# Patient Record
Sex: FEMALE | Race: WHITE | NOT HISPANIC OR LATINO
[De-identification: names, ages, dates, MRNs, and addresses within clinical notes are randomized per-mention and may not be internally consistent; named-entity substitution may affect disease eponyms.]

---

## 2017-01-06 ENCOUNTER — APPOINTMENT (OUTPATIENT)
Dept: INTERNAL MEDICINE | Facility: CLINIC | Age: 82
End: 2017-01-06

## 2017-01-06 VITALS
HEART RATE: 89 BPM | SYSTOLIC BLOOD PRESSURE: 189 MMHG | TEMPERATURE: 98 F | BODY MASS INDEX: 21.5 KG/M2 | RESPIRATION RATE: 12 BRPM | WEIGHT: 137 LBS | OXYGEN SATURATION: 98 % | HEIGHT: 67 IN | DIASTOLIC BLOOD PRESSURE: 109 MMHG

## 2017-01-06 VITALS — DIASTOLIC BLOOD PRESSURE: 106 MMHG | SYSTOLIC BLOOD PRESSURE: 180 MMHG

## 2017-02-10 ENCOUNTER — APPOINTMENT (OUTPATIENT)
Dept: INTERNAL MEDICINE | Facility: CLINIC | Age: 82
End: 2017-02-10

## 2017-02-14 ENCOUNTER — APPOINTMENT (OUTPATIENT)
Dept: INTERNAL MEDICINE | Facility: CLINIC | Age: 82
End: 2017-02-14

## 2017-02-14 VITALS — DIASTOLIC BLOOD PRESSURE: 85 MMHG | SYSTOLIC BLOOD PRESSURE: 145 MMHG

## 2017-02-14 VITALS
OXYGEN SATURATION: 98 % | WEIGHT: 138 LBS | HEIGHT: 67 IN | RESPIRATION RATE: 12 BRPM | DIASTOLIC BLOOD PRESSURE: 95 MMHG | TEMPERATURE: 97.8 F | SYSTOLIC BLOOD PRESSURE: 172 MMHG | HEART RATE: 111 BPM | BODY MASS INDEX: 21.66 KG/M2

## 2017-02-14 VITALS — DIASTOLIC BLOOD PRESSURE: 90 MMHG | SYSTOLIC BLOOD PRESSURE: 150 MMHG

## 2017-02-14 DIAGNOSIS — G44.219 EPISODIC TENSION-TYPE HEADACHE, NOT INTRACTABLE: ICD-10-CM

## 2017-02-15 ENCOUNTER — APPOINTMENT (OUTPATIENT)
Dept: CARDIOLOGY | Facility: CLINIC | Age: 82
End: 2017-02-15

## 2017-02-15 VITALS
BODY MASS INDEX: 22.29 KG/M2 | OXYGEN SATURATION: 96 % | HEIGHT: 67 IN | DIASTOLIC BLOOD PRESSURE: 80 MMHG | HEART RATE: 94 BPM | RESPIRATION RATE: 12 BRPM | WEIGHT: 142 LBS | SYSTOLIC BLOOD PRESSURE: 158 MMHG | TEMPERATURE: 97.8 F

## 2017-02-15 VITALS — SYSTOLIC BLOOD PRESSURE: 128 MMHG | DIASTOLIC BLOOD PRESSURE: 80 MMHG

## 2017-02-15 LAB
25(OH)D3 SERPL-MCNC: 37.1 NG/ML
ALBUMIN SERPL ELPH-MCNC: 4.2 G/DL
ALP BLD-CCNC: 82 U/L
ALT SERPL-CCNC: 21 U/L
ANION GAP SERPL CALC-SCNC: 21 MMOL/L
AST SERPL-CCNC: 31 U/L
BILIRUB SERPL-MCNC: 0.6 MG/DL
BUN SERPL-MCNC: 15 MG/DL
CALCIUM SERPL-MCNC: 9.3 MG/DL
CHLORIDE SERPL-SCNC: 101 MMOL/L
CHOLEST SERPL-MCNC: 220 MG/DL
CHOLEST/HDLC SERPL: 2.7 RATIO
CO2 SERPL-SCNC: 25 MMOL/L
CREAT SERPL-MCNC: 0.92 MG/DL
CREAT SPEC-SCNC: 54 MG/DL
FERRITIN SERPL-MCNC: 115 NG/ML
GLUCOSE SERPL-MCNC: 71 MG/DL
HBA1C MFR BLD HPLC: 5.6 %
HDLC SERPL-MCNC: 83 MG/DL
IRON SERPL-MCNC: 91 UG/DL
LDLC SERPL CALC-MCNC: 109 MG/DL
MICROALBUMIN 24H UR DL<=1MG/L-MCNC: 1.4 MG/DL
MICROALBUMIN/CREAT 24H UR-RTO: 26 UG/MG
POTASSIUM SERPL-SCNC: 4 MMOL/L
PROT SERPL-MCNC: 7.3 G/DL
SODIUM SERPL-SCNC: 147 MMOL/L
TRIGL SERPL-MCNC: 142 MG/DL
TSH SERPL-ACNC: 5.7 UIU/ML

## 2017-02-22 DIAGNOSIS — E87.0 HYPEROSMOLALITY AND HYPERNATREMIA: ICD-10-CM

## 2017-02-22 LAB
BASOPHILS # BLD AUTO: 0.05 K/UL
BASOPHILS NFR BLD AUTO: 0.6 %
EOSINOPHIL # BLD AUTO: 0.18 K/UL
EOSINOPHIL NFR BLD AUTO: 2.3 %
HCT VFR BLD CALC: 45.3 %
HGB BLD-MCNC: 14.5 G/DL
IMM GRANULOCYTES NFR BLD AUTO: 0.3 %
LYMPHOCYTES # BLD AUTO: 1.83 K/UL
LYMPHOCYTES NFR BLD AUTO: 23.1 %
MAN DIFF?: NORMAL
MCHC RBC-ENTMCNC: 30.6 PG
MCHC RBC-ENTMCNC: 32 GM/DL
MCV RBC AUTO: 95.6 FL
MONOCYTES # BLD AUTO: 0.72 K/UL
MONOCYTES NFR BLD AUTO: 9.1 %
NEUTROPHILS # BLD AUTO: 5.11 K/UL
NEUTROPHILS NFR BLD AUTO: 64.6 %
PLATELET # BLD AUTO: 233 K/UL
RBC # BLD: 4.74 M/UL
RBC # FLD: 13.7 %
WBC # FLD AUTO: 7.91 K/UL

## 2017-03-22 ENCOUNTER — NON-APPOINTMENT (OUTPATIENT)
Age: 82
End: 2017-03-22

## 2017-03-22 ENCOUNTER — APPOINTMENT (OUTPATIENT)
Dept: CARDIOLOGY | Facility: CLINIC | Age: 82
End: 2017-03-22

## 2017-03-22 VITALS
DIASTOLIC BLOOD PRESSURE: 95 MMHG | BODY MASS INDEX: 21.46 KG/M2 | WEIGHT: 137 LBS | OXYGEN SATURATION: 98 % | RESPIRATION RATE: 12 BRPM | HEART RATE: 103 BPM | SYSTOLIC BLOOD PRESSURE: 158 MMHG

## 2017-03-22 VITALS — DIASTOLIC BLOOD PRESSURE: 78 MMHG | SYSTOLIC BLOOD PRESSURE: 134 MMHG

## 2017-03-23 LAB
ANION GAP SERPL CALC-SCNC: 17 MMOL/L
BUN SERPL-MCNC: 18 MG/DL
CALCIUM SERPL-MCNC: 9.4 MG/DL
CHLORIDE SERPL-SCNC: 97 MMOL/L
CO2 SERPL-SCNC: 22 MMOL/L
CREAT SERPL-MCNC: 1.03 MG/DL
GLUCOSE SERPL-MCNC: 80 MG/DL
POTASSIUM SERPL-SCNC: 4.4 MMOL/L
SODIUM SERPL-SCNC: 136 MMOL/L

## 2017-05-02 ENCOUNTER — RX RENEWAL (OUTPATIENT)
Age: 82
End: 2017-05-02

## 2017-06-07 ENCOUNTER — APPOINTMENT (OUTPATIENT)
Dept: INTERNAL MEDICINE | Facility: CLINIC | Age: 82
End: 2017-06-07

## 2017-06-07 VITALS
BODY MASS INDEX: 20.88 KG/M2 | OXYGEN SATURATION: 98 % | HEART RATE: 89 BPM | WEIGHT: 133 LBS | TEMPERATURE: 97.4 F | SYSTOLIC BLOOD PRESSURE: 157 MMHG | HEIGHT: 67 IN | DIASTOLIC BLOOD PRESSURE: 89 MMHG | RESPIRATION RATE: 12 BRPM

## 2017-06-07 VITALS — SYSTOLIC BLOOD PRESSURE: 130 MMHG | DIASTOLIC BLOOD PRESSURE: 80 MMHG

## 2017-06-07 DIAGNOSIS — K62.5 HEMORRHAGE OF ANUS AND RECTUM: ICD-10-CM

## 2017-06-07 DIAGNOSIS — R26.81 UNSTEADINESS ON FEET: ICD-10-CM

## 2017-06-07 RX ADMIN — CYANOCOBALAMIN 1 MCG/ML: 1000 INJECTION INTRAMUSCULAR; SUBCUTANEOUS at 00:00

## 2017-06-08 RX ORDER — CYANOCOBALAMIN 1000 UG/ML
1000 INJECTION INTRAMUSCULAR; SUBCUTANEOUS
Qty: 0 | Refills: 0 | Status: COMPLETED | OUTPATIENT
Start: 2017-06-07

## 2017-06-21 ENCOUNTER — NON-APPOINTMENT (OUTPATIENT)
Age: 82
End: 2017-06-21

## 2017-06-21 ENCOUNTER — APPOINTMENT (OUTPATIENT)
Dept: CARDIOLOGY | Facility: CLINIC | Age: 82
End: 2017-06-21

## 2017-06-21 VITALS
SYSTOLIC BLOOD PRESSURE: 136 MMHG | WEIGHT: 136 LBS | HEIGHT: 67 IN | HEART RATE: 80 BPM | BODY MASS INDEX: 21.35 KG/M2 | OXYGEN SATURATION: 96 % | DIASTOLIC BLOOD PRESSURE: 75 MMHG | RESPIRATION RATE: 12 BRPM

## 2017-08-09 ENCOUNTER — APPOINTMENT (OUTPATIENT)
Dept: INTERNAL MEDICINE | Facility: CLINIC | Age: 82
End: 2017-08-09
Payer: MEDICARE

## 2017-08-09 VITALS — DIASTOLIC BLOOD PRESSURE: 80 MMHG | SYSTOLIC BLOOD PRESSURE: 130 MMHG

## 2017-08-09 VITALS
DIASTOLIC BLOOD PRESSURE: 89 MMHG | TEMPERATURE: 97.5 F | HEART RATE: 74 BPM | WEIGHT: 132 LBS | RESPIRATION RATE: 12 BRPM | BODY MASS INDEX: 20.72 KG/M2 | HEIGHT: 67 IN | SYSTOLIC BLOOD PRESSURE: 167 MMHG | OXYGEN SATURATION: 98 %

## 2017-08-09 PROCEDURE — 99215 OFFICE O/P EST HI 40 MIN: CPT

## 2017-08-09 RX ORDER — FLUTICASONE PROPIONATE 50 UG/1
50 SPRAY, METERED NASAL TWICE DAILY
Qty: 1 | Refills: 3 | Status: DISCONTINUED | COMMUNITY
Start: 2017-06-07 | End: 2017-08-09

## 2017-08-09 RX ORDER — AZITHROMYCIN 250 MG/1
250 TABLET, FILM COATED ORAL
Qty: 30 | Refills: 5 | Status: DISCONTINUED | COMMUNITY
Start: 2017-06-07 | End: 2017-08-09

## 2017-08-09 RX ORDER — MIRTAZAPINE 15 MG/1
15 TABLET, FILM COATED ORAL
Qty: 30 | Refills: 3 | Status: DISCONTINUED | COMMUNITY
End: 2017-08-09

## 2017-08-09 RX ORDER — AZITHROMYCIN 250 MG/1
250 TABLET, FILM COATED ORAL
Qty: 1 | Refills: 0 | Status: DISCONTINUED | COMMUNITY
Start: 2017-06-07 | End: 2017-08-09

## 2017-09-05 ENCOUNTER — APPOINTMENT (OUTPATIENT)
Dept: INTERNAL MEDICINE | Facility: CLINIC | Age: 82
End: 2017-09-05
Payer: MEDICARE

## 2017-09-05 VITALS
SYSTOLIC BLOOD PRESSURE: 177 MMHG | HEART RATE: 86 BPM | HEIGHT: 67 IN | DIASTOLIC BLOOD PRESSURE: 96 MMHG | WEIGHT: 130 LBS | BODY MASS INDEX: 20.4 KG/M2 | TEMPERATURE: 97.7 F | RESPIRATION RATE: 12 BRPM | OXYGEN SATURATION: 97 %

## 2017-09-05 VITALS — DIASTOLIC BLOOD PRESSURE: 80 MMHG | SYSTOLIC BLOOD PRESSURE: 130 MMHG

## 2017-09-05 DIAGNOSIS — Z87.39 PERSONAL HISTORY OF OTHER DISEASES OF THE MUSCULOSKELETAL SYSTEM AND CONNECTIVE TISSUE: ICD-10-CM

## 2017-09-05 PROCEDURE — 99397 PER PM REEVAL EST PAT 65+ YR: CPT

## 2017-09-06 LAB
25(OH)D3 SERPL-MCNC: 39.1 NG/ML
ALBUMIN SERPL ELPH-MCNC: 4.1 G/DL
ALP BLD-CCNC: 63 U/L
ALT SERPL-CCNC: 20 U/L
ANION GAP SERPL CALC-SCNC: 18 MMOL/L
APPEARANCE: CLEAR
AST SERPL-CCNC: 28 U/L
BASOPHILS # BLD AUTO: 0.03 K/UL
BASOPHILS NFR BLD AUTO: 0.5 %
BILIRUB SERPL-MCNC: 0.7 MG/DL
BILIRUBIN URINE: NEGATIVE
BLOOD URINE: NEGATIVE
BUN SERPL-MCNC: 18 MG/DL
CALCIUM SERPL-MCNC: 9.7 MG/DL
CHLORIDE SERPL-SCNC: 98 MMOL/L
CHOLEST SERPL-MCNC: 211 MG/DL
CHOLEST/HDLC SERPL: 2.6 RATIO
CO2 SERPL-SCNC: 22 MMOL/L
COLOR: YELLOW
CREAT SERPL-MCNC: 1.03 MG/DL
CREAT SPEC-SCNC: 22 MG/DL
EOSINOPHIL # BLD AUTO: 0.14 K/UL
EOSINOPHIL NFR BLD AUTO: 2.3 %
FERRITIN SERPL-MCNC: 146 NG/ML
FOLATE SERPL-MCNC: >20 NG/ML
GLUCOSE QUALITATIVE U: NORMAL MG/DL
GLUCOSE SERPL-MCNC: 87 MG/DL
HBA1C MFR BLD HPLC: 5.5 %
HCT VFR BLD CALC: 42.4 %
HDLC SERPL-MCNC: 80 MG/DL
HGB BLD-MCNC: 14 G/DL
IMM GRANULOCYTES NFR BLD AUTO: 0.2 %
KETONES URINE: NEGATIVE
LDLC SERPL CALC-MCNC: 117 MG/DL
LEUKOCYTE ESTERASE URINE: NEGATIVE
LYMPHOCYTES # BLD AUTO: 1.35 K/UL
LYMPHOCYTES NFR BLD AUTO: 21.7 %
MAN DIFF?: NORMAL
MCHC RBC-ENTMCNC: 31.3 PG
MCHC RBC-ENTMCNC: 33 GM/DL
MCV RBC AUTO: 94.6 FL
MICROALBUMIN 24H UR DL<=1MG/L-MCNC: <0.3 MG/DL
MICROALBUMIN/CREAT 24H UR-RTO: NORMAL
MONOCYTES # BLD AUTO: 0.43 K/UL
MONOCYTES NFR BLD AUTO: 6.9 %
NEUTROPHILS # BLD AUTO: 4.26 K/UL
NEUTROPHILS NFR BLD AUTO: 68.4 %
NITRITE URINE: NEGATIVE
PH URINE: 7.5
PLATELET # BLD AUTO: 225 K/UL
POTASSIUM SERPL-SCNC: 4 MMOL/L
PROT SERPL-MCNC: 7.5 G/DL
PROTEIN URINE: NEGATIVE MG/DL
RBC # BLD: 4.48 M/UL
RBC # FLD: 13.9 %
SODIUM SERPL-SCNC: 138 MMOL/L
SPECIFIC GRAVITY URINE: 1.01
TRIGL SERPL-MCNC: 72 MG/DL
TSH SERPL-ACNC: 4.18 UIU/ML
UROBILINOGEN URINE: NORMAL MG/DL
VIT B12 SERPL-MCNC: 1828 PG/ML
WBC # FLD AUTO: 6.22 K/UL

## 2018-01-03 ENCOUNTER — APPOINTMENT (OUTPATIENT)
Dept: INTERNAL MEDICINE | Facility: CLINIC | Age: 83
End: 2018-01-03
Payer: MEDICARE

## 2018-01-03 VITALS
SYSTOLIC BLOOD PRESSURE: 184 MMHG | OXYGEN SATURATION: 98 % | HEART RATE: 89 BPM | TEMPERATURE: 97.7 F | DIASTOLIC BLOOD PRESSURE: 109 MMHG | RESPIRATION RATE: 12 BRPM | WEIGHT: 130 LBS | HEIGHT: 67 IN | BODY MASS INDEX: 20.4 KG/M2

## 2018-01-03 VITALS — DIASTOLIC BLOOD PRESSURE: 80 MMHG | SYSTOLIC BLOOD PRESSURE: 135 MMHG

## 2018-01-03 PROCEDURE — 99214 OFFICE O/P EST MOD 30 MIN: CPT

## 2018-01-03 RX ORDER — PNEUMOCOCCAL 13-VALENT CONJUGATE VACCINE 2.2; 2.2; 2.2; 2.2; 2.2; 4.4; 2.2; 2.2; 2.2; 2.2; 2.2; 2.2; 2.2 UG/.5ML; UG/.5ML; UG/.5ML; UG/.5ML; UG/.5ML; UG/.5ML; UG/.5ML; UG/.5ML; UG/.5ML; UG/.5ML; UG/.5ML; UG/.5ML; UG/.5ML
INJECTION, SUSPENSION INTRAMUSCULAR
Qty: 1 | Refills: 0 | Status: DISCONTINUED | COMMUNITY
Start: 2017-09-05 | End: 2018-01-03

## 2018-04-13 ENCOUNTER — APPOINTMENT (OUTPATIENT)
Dept: INTERNAL MEDICINE | Facility: CLINIC | Age: 83
End: 2018-04-13
Payer: MEDICARE

## 2018-04-13 VITALS
RESPIRATION RATE: 12 BRPM | DIASTOLIC BLOOD PRESSURE: 92 MMHG | BODY MASS INDEX: 20.56 KG/M2 | OXYGEN SATURATION: 96 % | WEIGHT: 131 LBS | TEMPERATURE: 98.1 F | HEIGHT: 67 IN | HEART RATE: 82 BPM | SYSTOLIC BLOOD PRESSURE: 164 MMHG

## 2018-04-13 VITALS — DIASTOLIC BLOOD PRESSURE: 85 MMHG | SYSTOLIC BLOOD PRESSURE: 130 MMHG

## 2018-04-13 PROCEDURE — 99214 OFFICE O/P EST MOD 30 MIN: CPT

## 2018-04-13 RX ORDER — AMLODIPINE BESYLATE 2.5 MG/1
2.5 TABLET ORAL DAILY
Qty: 30 | Refills: 5 | Status: DISCONTINUED | COMMUNITY
Start: 2017-01-06 | End: 2018-04-13

## 2018-06-22 ENCOUNTER — APPOINTMENT (OUTPATIENT)
Dept: INTERNAL MEDICINE | Facility: CLINIC | Age: 83
End: 2018-06-22
Payer: MEDICARE

## 2018-06-22 VITALS — DIASTOLIC BLOOD PRESSURE: 75 MMHG | SYSTOLIC BLOOD PRESSURE: 130 MMHG

## 2018-06-22 VITALS
DIASTOLIC BLOOD PRESSURE: 72 MMHG | OXYGEN SATURATION: 96 % | WEIGHT: 130 LBS | SYSTOLIC BLOOD PRESSURE: 181 MMHG | HEART RATE: 93 BPM | RESPIRATION RATE: 12 BRPM | HEIGHT: 67 IN | BODY MASS INDEX: 20.4 KG/M2 | TEMPERATURE: 98.6 F
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
## 2018-06-22 PROCEDURE — 99215 OFFICE O/P EST HI 40 MIN: CPT

## 2018-06-22 NOTE — REVIEW OF SYSTEMS
[Constipation] : constipation [Heartburn] : heartburn [Dizziness] : dizziness [Negative] : Heme/Lymph [Abdominal Pain] : no abdominal pain [Nausea] : no nausea [Diarrhea] : diarrhea [Vomiting] : no vomiting [Melena] : no melena [Headache] : no headache [Memory Loss] : no memory loss [Unsteady Walking] : no ataxia

## 2018-07-11 ENCOUNTER — NON-APPOINTMENT (OUTPATIENT)
Age: 83
End: 2018-07-11

## 2018-07-11 ENCOUNTER — APPOINTMENT (OUTPATIENT)
Dept: CARDIOLOGY | Facility: CLINIC | Age: 83
End: 2018-07-11
Payer: MEDICARE

## 2018-07-11 VITALS
TEMPERATURE: 98.4 F | BODY MASS INDEX: 20.88 KG/M2 | HEIGHT: 67 IN | DIASTOLIC BLOOD PRESSURE: 72 MMHG | WEIGHT: 133 LBS | OXYGEN SATURATION: 96 % | HEART RATE: 96 BPM | RESPIRATION RATE: 12 BRPM | SYSTOLIC BLOOD PRESSURE: 171 MMHG

## 2018-07-11 VITALS — DIASTOLIC BLOOD PRESSURE: 68 MMHG | SYSTOLIC BLOOD PRESSURE: 128 MMHG

## 2018-07-11 PROCEDURE — 99214 OFFICE O/P EST MOD 30 MIN: CPT

## 2018-09-05 ENCOUNTER — LABORATORY RESULT (OUTPATIENT)
Age: 83
End: 2018-09-05

## 2018-09-05 ENCOUNTER — APPOINTMENT (OUTPATIENT)
Dept: INTERNAL MEDICINE | Facility: CLINIC | Age: 83
End: 2018-09-05
Payer: MEDICARE

## 2018-09-05 VITALS
HEART RATE: 86 BPM | SYSTOLIC BLOOD PRESSURE: 165 MMHG | RESPIRATION RATE: 12 BRPM | BODY MASS INDEX: 20.88 KG/M2 | HEIGHT: 67 IN | WEIGHT: 133 LBS | OXYGEN SATURATION: 97 % | TEMPERATURE: 98.6 F | DIASTOLIC BLOOD PRESSURE: 91 MMHG

## 2018-09-05 VITALS — DIASTOLIC BLOOD PRESSURE: 80 MMHG | SYSTOLIC BLOOD PRESSURE: 140 MMHG

## 2018-09-05 PROCEDURE — 99397 PER PM REEVAL EST PAT 65+ YR: CPT

## 2018-09-05 PROCEDURE — G0438: CPT

## 2018-09-05 RX ORDER — BISMUTH SUBSALICYLATE 525 MG/1
TABLET ORAL
Qty: 30 | Refills: 0 | Status: DISCONTINUED | COMMUNITY
Start: 2017-02-14 | End: 2018-09-05

## 2018-09-05 RX ORDER — HALOBETASOL PROPIONATE 0.5 MG/G
0.05 CREAM TOPICAL TWICE DAILY
Qty: 60 | Refills: 1 | Status: DISCONTINUED | COMMUNITY
Start: 2018-06-22 | End: 2018-09-05

## 2018-09-05 RX ORDER — SERTRALINE 25 MG/1
25 TABLET, FILM COATED ORAL DAILY
Qty: 30 | Refills: 3 | Status: DISCONTINUED | COMMUNITY
End: 2018-09-05

## 2018-09-05 NOTE — HEALTH RISK ASSESSMENT
[Fair] :  ~his/her~ mood as fair [No falls in past year] : Patient reported no falls in the past year [3] : 2) Feeling down, depressed, or hopeless for nearly every day (3) [] : No

## 2018-09-05 NOTE — PHYSICAL EXAM

## 2018-09-05 NOTE — REVIEW OF SYSTEMS
[Insomnia] : insomnia [Anxiety] : anxiety [Depression] : depression [Negative] : Heme/Lymph [Abdominal Pain] : abdominal pain [Nausea] : nausea [Constipation] : constipation [Heartburn] : heartburn [Itching] : Itching [Skin Rash] : skin rash [Diarrhea] : diarrhea [Vomiting] : no vomiting [Melena] : no melena [Suicidal] : not suicidal

## 2018-09-07 ENCOUNTER — APPOINTMENT (OUTPATIENT)
Dept: GASTROENTEROLOGY | Facility: CLINIC | Age: 83
End: 2018-09-07

## 2018-09-07 LAB
25(OH)D3 SERPL-MCNC: 32.2 NG/ML
ALBUMIN SERPL ELPH-MCNC: 4 G/DL
ALP BLD-CCNC: 57 U/L
ALT SERPL-CCNC: 17 U/L
ANION GAP SERPL CALC-SCNC: 13 MMOL/L
APPEARANCE: CLEAR
AST SERPL-CCNC: 26 U/L
BASOPHILS # BLD AUTO: 0.03 K/UL
BASOPHILS NFR BLD AUTO: 0.6 %
BILIRUB SERPL-MCNC: 0.6 MG/DL
BILIRUBIN URINE: NEGATIVE
BLOOD URINE: ABNORMAL
BUN SERPL-MCNC: 25 MG/DL
CALCIUM SERPL-MCNC: 9.1 MG/DL
CHLORIDE SERPL-SCNC: 100 MMOL/L
CO2 SERPL-SCNC: 27 MMOL/L
COLOR: YELLOW
CREAT SERPL-MCNC: 0.99 MG/DL
CREAT SPEC-SCNC: 41 MG/DL
EOSINOPHIL # BLD AUTO: 0.16 K/UL
EOSINOPHIL NFR BLD AUTO: 3.2 %
FERRITIN SERPL-MCNC: 141 NG/ML
FOLATE SERPL-MCNC: >20 NG/ML
GLUCOSE QUALITATIVE U: NEGATIVE MG/DL
GLUCOSE SERPL-MCNC: 92 MG/DL
HBA1C MFR BLD HPLC: 5.5 %
HCT VFR BLD CALC: 39.6 %
HGB BLD-MCNC: 12.7 G/DL
IMM GRANULOCYTES NFR BLD AUTO: 0.4 %
KETONES URINE: NEGATIVE
LEUKOCYTE ESTERASE URINE: NEGATIVE
LYMPHOCYTES # BLD AUTO: 1.23 K/UL
LYMPHOCYTES NFR BLD AUTO: 24.4 %
MAN DIFF?: NORMAL
MCHC RBC-ENTMCNC: 30.6 PG
MCHC RBC-ENTMCNC: 32.1 GM/DL
MCV RBC AUTO: 95.4 FL
MICROALBUMIN 24H UR DL<=1MG/L-MCNC: <1.2 MG/DL
MICROALBUMIN/CREAT 24H UR-RTO: NORMAL
MONOCYTES # BLD AUTO: 0.42 K/UL
MONOCYTES NFR BLD AUTO: 8.3 %
NEUTROPHILS # BLD AUTO: 3.18 K/UL
NEUTROPHILS NFR BLD AUTO: 63.1 %
NITRITE URINE: NEGATIVE
PH URINE: 7
PLATELET # BLD AUTO: 198 K/UL
POTASSIUM SERPL-SCNC: 3.8 MMOL/L
PROT SERPL-MCNC: 7.1 G/DL
PROTEIN URINE: NEGATIVE MG/DL
RBC # BLD: 4.15 M/UL
RBC # FLD: 14.3 %
SODIUM SERPL-SCNC: 140 MMOL/L
SPECIFIC GRAVITY URINE: 1.01
TSH SERPL-ACNC: 2.89 UIU/ML
UROBILINOGEN URINE: NEGATIVE MG/DL
VIT B12 SERPL-MCNC: 908 PG/ML
WBC # FLD AUTO: 5.04 K/UL

## 2018-09-13 LAB
CHOLEST SERPL-MCNC: 186 MG/DL
CHOLEST/HDLC SERPL: 2.7 RATIO
HDLC SERPL-MCNC: 69 MG/DL
LDLC SERPL CALC-MCNC: 105 MG/DL
TRIGL SERPL-MCNC: 62 MG/DL

## 2018-10-31 ENCOUNTER — RX RENEWAL (OUTPATIENT)
Age: 83
End: 2018-10-31

## 2018-11-26 ENCOUNTER — APPOINTMENT (OUTPATIENT)
Dept: INTERNAL MEDICINE | Facility: CLINIC | Age: 83
End: 2018-11-26
Payer: MEDICARE

## 2018-11-26 VITALS
BODY MASS INDEX: 21.66 KG/M2 | SYSTOLIC BLOOD PRESSURE: 160 MMHG | HEIGHT: 67 IN | WEIGHT: 138 LBS | DIASTOLIC BLOOD PRESSURE: 94 MMHG | RESPIRATION RATE: 12 BRPM | HEART RATE: 91 BPM | OXYGEN SATURATION: 96 % | TEMPERATURE: 98.3 F

## 2018-11-26 VITALS — SYSTOLIC BLOOD PRESSURE: 130 MMHG | DIASTOLIC BLOOD PRESSURE: 78 MMHG

## 2018-11-26 DIAGNOSIS — R92.2 INCONCLUSIVE MAMMOGRAM: ICD-10-CM

## 2018-11-26 PROCEDURE — 99215 OFFICE O/P EST HI 40 MIN: CPT

## 2018-11-26 NOTE — REVIEW OF SYSTEMS
[Chest Pain] : chest pain [Abdominal Pain] : abdominal pain [Nausea] : nausea [Constipation] : constipation [Heartburn] : heartburn [Insomnia] : insomnia [Anxiety] : anxiety [Depression] : depression [Negative] : Neurological [Palpitations] : no palpitations [Leg Claudication] : no leg claudication [Lower Ext Edema] : no lower extremity edema [Orthopnea] : no orthopnea [Paroysmal Nocturnal Dyspnea] : no paroysmal nocturnal dyspnea [Diarrhea] : diarrhea [Vomiting] : no vomiting [Melena] : no melena [Suicidal] : not suicidal

## 2019-01-10 ENCOUNTER — APPOINTMENT (OUTPATIENT)
Dept: INTERNAL MEDICINE | Facility: CLINIC | Age: 84
End: 2019-01-10
Payer: MEDICARE

## 2019-01-10 VITALS
OXYGEN SATURATION: 96 % | RESPIRATION RATE: 12 BRPM | BODY MASS INDEX: 21.66 KG/M2 | HEART RATE: 85 BPM | HEIGHT: 67 IN | DIASTOLIC BLOOD PRESSURE: 90 MMHG | WEIGHT: 138 LBS | TEMPERATURE: 97.7 F | SYSTOLIC BLOOD PRESSURE: 140 MMHG

## 2019-01-10 DIAGNOSIS — Z87.09 PERSONAL HISTORY OF OTHER DISEASES OF THE RESPIRATORY SYSTEM: ICD-10-CM

## 2019-01-10 PROCEDURE — 99214 OFFICE O/P EST MOD 30 MIN: CPT

## 2019-01-30 NOTE — REVIEW OF SYSTEMS
[Fever] : fever [Chills] : chills [Fatigue] : fatigue [Nasal Discharge] : nasal discharge [Sore Throat] : sore throat [Cough] : cough [Negative] : Heme/Lymph

## 2019-01-30 NOTE — HISTORY OF PRESENT ILLNESS
[de-identified] : 84 year old female presents with acute URI x 10 days : + sore throat/ runny nose, cough, + associated fever, chills. Symptoms getting worse at night .She takes OTC cold medication without much improvement. \par

## 2019-01-30 NOTE — PHYSICAL EXAM
[No Acute Distress] : no acute distress [Well Nourished] : well nourished [Well Developed] : well developed [Ill-Appearing] : ill-appearing [Normal Sclera/Conjunctiva] : normal sclera/conjunctiva [EOMI] : extraocular movements intact [Normal Outer Ear/Nose] : the outer ears and nose were normal in appearance [Normal TMs] : both tympanic membranes were normal [No JVD] : no jugular venous distention [Supple] : supple [No Lymphadenopathy] : no lymphadenopathy [Thyroid Normal, No Nodules] : the thyroid was normal and there were no nodules present [No Respiratory Distress] : no respiratory distress  [Clear to Auscultation] : lungs were clear to auscultation bilaterally [No Accessory Muscle Use] : no accessory muscle use [Normal Rate] : normal rate  [Regular Rhythm] : with a regular rhythm [Normal S1, S2] : normal S1 and S2 [No Murmur] : no murmur heard [No Carotid Bruits] : no carotid bruits [Pedal Pulses Present] : the pedal pulses are present [No Edema] : there was no peripheral edema [Soft] : abdomen soft [Non Tender] : non-tender [Non-distended] : non-distended [No Masses] : no abdominal mass palpated [No HSM] : no HSM [Normal Bowel Sounds] : normal bowel sounds [Normal Posterior Cervical Nodes] : no posterior cervical lymphadenopathy [Normal Anterior Cervical Nodes] : no anterior cervical lymphadenopathy [No CVA Tenderness] : no CVA  tenderness [No Spinal Tenderness] : no spinal tenderness [No Joint Swelling] : no joint swelling [Grossly Normal Strength/Tone] : grossly normal strength/tone [No Rash] : no rash [Normal Gait] : normal gait [Coordination Grossly Intact] : coordination grossly intact [No Focal Deficits] : no focal deficits [Normal Affect] : the affect was normal [Normal Insight/Judgement] : insight and judgment were intact [de-identified] : + throat erythema/ + tenderness over the maxillary sinus area

## 2019-02-27 ENCOUNTER — APPOINTMENT (OUTPATIENT)
Dept: INTERNAL MEDICINE | Facility: CLINIC | Age: 84
End: 2019-02-27
Payer: MEDICARE

## 2019-02-27 ENCOUNTER — LABORATORY RESULT (OUTPATIENT)
Age: 84
End: 2019-02-27

## 2019-02-27 VITALS
DIASTOLIC BLOOD PRESSURE: 95 MMHG | OXYGEN SATURATION: 94 % | SYSTOLIC BLOOD PRESSURE: 184 MMHG | TEMPERATURE: 97.5 F | HEART RATE: 97 BPM | BODY MASS INDEX: 21.19 KG/M2 | HEIGHT: 67 IN | WEIGHT: 135 LBS | RESPIRATION RATE: 12 BRPM

## 2019-02-27 VITALS — DIASTOLIC BLOOD PRESSURE: 80 MMHG | SYSTOLIC BLOOD PRESSURE: 148 MMHG

## 2019-02-27 DIAGNOSIS — K21.9 GASTRO-ESOPHAGEAL REFLUX DISEASE W/OUT ESOPHAGITIS: ICD-10-CM

## 2019-02-27 LAB
CREAT SPEC-SCNC: 57 MG/DL
MICROALBUMIN 24H UR DL<=1MG/L-MCNC: <1.2 MG/DL
MICROALBUMIN/CREAT 24H UR-RTO: NORMAL MG/G

## 2019-02-27 PROCEDURE — 99214 OFFICE O/P EST MOD 30 MIN: CPT

## 2019-02-27 RX ORDER — AMOXICILLIN AND CLAVULANATE POTASSIUM 500; 125 MG/1; MG/1
500-125 TABLET, FILM COATED ORAL TWICE DAILY
Qty: 14 | Refills: 0 | Status: DISCONTINUED | COMMUNITY
Start: 2019-01-10 | End: 2019-02-27

## 2019-02-27 NOTE — REVIEW OF SYSTEMS
[Joint Pain] : joint pain [Joint Stiffness] : joint stiffness [Insomnia] : insomnia [Anxiety] : anxiety [Depression] : depression [Negative] : Heme/Lymph [Joint Swelling] : no joint swelling [Muscle Weakness] : no muscle weakness [Muscle Pain] : no muscle pain [Back Pain] : no back pain [Suicidal] : not suicidal

## 2019-03-01 LAB
25(OH)D3 SERPL-MCNC: 35.3 NG/ML
ALBUMIN SERPL ELPH-MCNC: 4.3 G/DL
ALP BLD-CCNC: 71 U/L
ALT SERPL-CCNC: 16 U/L
ANION GAP SERPL CALC-SCNC: 13 MMOL/L
APPEARANCE: CLEAR
AST SERPL-CCNC: 21 U/L
BILIRUB SERPL-MCNC: 0.5 MG/DL
BILIRUBIN URINE: NEGATIVE
BLOOD URINE: ABNORMAL
BUN SERPL-MCNC: 19 MG/DL
CALCIUM SERPL-MCNC: 9.5 MG/DL
CHLORIDE SERPL-SCNC: 99 MMOL/L
CHOLEST SERPL-MCNC: 202 MG/DL
CHOLEST/HDLC SERPL: 2.7 RATIO
CO2 SERPL-SCNC: 28 MMOL/L
COLOR: NORMAL
CREAT SERPL-MCNC: 0.98 MG/DL
FERRITIN SERPL-MCNC: 147 NG/ML
FOLATE SERPL-MCNC: >20 NG/ML
GLUCOSE QUALITATIVE U: NEGATIVE
GLUCOSE SERPL-MCNC: 100 MG/DL
GLUCOSE SERPL-MCNC: 97 MG/DL
HBA1C MFR BLD HPLC: 5.7 %
HDLC SERPL-MCNC: 76 MG/DL
KETONES URINE: NEGATIVE
LDLC SERPL CALC-MCNC: 105 MG/DL
LEUKOCYTE ESTERASE URINE: NEGATIVE
NITRITE URINE: NEGATIVE
PH URINE: 7
POTASSIUM SERPL-SCNC: 4.3 MMOL/L
PROT SERPL-MCNC: 7.2 G/DL
PROTEIN URINE: NEGATIVE
SODIUM SERPL-SCNC: 140 MMOL/L
SPECIFIC GRAVITY URINE: 1.01
TRIGL SERPL-MCNC: 103 MG/DL
TSH SERPL-ACNC: 4.03 UIU/ML
UROBILINOGEN URINE: NORMAL
VIT B12 SERPL-MCNC: 962 PG/ML

## 2019-03-05 LAB
BASOPHILS # BLD AUTO: 0.04 K/UL
BASOPHILS NFR BLD AUTO: 0.6 %
EOSINOPHIL # BLD AUTO: 0.13 K/UL
EOSINOPHIL NFR BLD AUTO: 1.9 %
HCT VFR BLD CALC: 43.5 %
HGB BLD-MCNC: 14.2 G/DL
IMM GRANULOCYTES NFR BLD AUTO: 0.3 %
LYMPHOCYTES # BLD AUTO: 1.27 K/UL
LYMPHOCYTES NFR BLD AUTO: 18.4 %
MAN DIFF?: NORMAL
MCHC RBC-ENTMCNC: 30.8 PG
MCHC RBC-ENTMCNC: 32.6 GM/DL
MCV RBC AUTO: 94.4 FL
MONOCYTES # BLD AUTO: 0.51 K/UL
MONOCYTES NFR BLD AUTO: 7.4 %
NEUTROPHILS # BLD AUTO: 4.93 K/UL
NEUTROPHILS NFR BLD AUTO: 71.4 %
PLATELET # BLD AUTO: 226 K/UL
RBC # BLD: 4.61 M/UL
RBC # FLD: 13.1 %
WBC # FLD AUTO: 6.9 K/UL

## 2019-04-26 ENCOUNTER — MEDICATION RENEWAL (OUTPATIENT)
Age: 84
End: 2019-04-26

## 2019-05-21 DIAGNOSIS — D25.9 LEIOMYOMA OF UTERUS, UNSPECIFIED: ICD-10-CM

## 2019-05-30 ENCOUNTER — APPOINTMENT (OUTPATIENT)
Dept: INTERNAL MEDICINE | Facility: CLINIC | Age: 84
End: 2019-05-30
Payer: MEDICARE

## 2019-05-30 VITALS
HEART RATE: 88 BPM | RESPIRATION RATE: 12 BRPM | WEIGHT: 132 LBS | OXYGEN SATURATION: 97 % | SYSTOLIC BLOOD PRESSURE: 161 MMHG | DIASTOLIC BLOOD PRESSURE: 75 MMHG | TEMPERATURE: 98.9 F | BODY MASS INDEX: 20.72 KG/M2 | HEIGHT: 67 IN

## 2019-05-30 VITALS — SYSTOLIC BLOOD PRESSURE: 130 MMHG | DIASTOLIC BLOOD PRESSURE: 75 MMHG

## 2019-05-30 DIAGNOSIS — R93.89 ABNORMAL FINDINGS ON DIAGNOSTIC IMAGING OF OTHER SPECIFIED BODY STRUCTURES: ICD-10-CM

## 2019-05-30 DIAGNOSIS — B35.1 TINEA UNGUIUM: ICD-10-CM

## 2019-05-30 PROCEDURE — 99214 OFFICE O/P EST MOD 30 MIN: CPT

## 2019-05-30 RX ORDER — FAMOTIDINE 20 MG/1
20 TABLET, FILM COATED ORAL
Qty: 60 | Refills: 0 | Status: DISCONTINUED | COMMUNITY
Start: 2017-06-07 | End: 2019-05-30

## 2019-05-30 NOTE — PHYSICAL EXAM
[No Acute Distress] : no acute distress [Well Nourished] : well nourished [Well Developed] : well developed [Well-Appearing] : well-appearing [Normal Sclera/Conjunctiva] : normal sclera/conjunctiva [PERRL] : pupils equal round and reactive to light [EOMI] : extraocular movements intact [Normal Outer Ear/Nose] : the outer ears and nose were normal in appearance [Normal Oropharynx] : the oropharynx was normal [No JVD] : no jugular venous distention [Supple] : supple [No Lymphadenopathy] : no lymphadenopathy [Thyroid Normal, No Nodules] : the thyroid was normal and there were no nodules present [No Respiratory Distress] : no respiratory distress  [Clear to Auscultation] : lungs were clear to auscultation bilaterally [No Accessory Muscle Use] : no accessory muscle use [Normal Rate] : normal rate  [Regular Rhythm] : with a regular rhythm [Normal S1, S2] : normal S1 and S2 [No Murmur] : no murmur heard [No Carotid Bruits] : no carotid bruits [No Abdominal Bruit] : a ~M bruit was not heard ~T in the abdomen [No Varicosities] : no varicosities [Pedal Pulses Present] : the pedal pulses are present [No Edema] : there was no peripheral edema [No Extremity Clubbing/Cyanosis] : no extremity clubbing/cyanosis [No Palpable Aorta] : no palpable aorta [Soft] : abdomen soft [Non Tender] : non-tender [Non-distended] : non-distended [No Masses] : no abdominal mass palpated [No HSM] : no HSM [Normal Bowel Sounds] : normal bowel sounds [Normal Posterior Cervical Nodes] : no posterior cervical lymphadenopathy [Normal Anterior Cervical Nodes] : no anterior cervical lymphadenopathy [No CVA Tenderness] : no CVA  tenderness [No Spinal Tenderness] : no spinal tenderness [No Joint Swelling] : no joint swelling [Grossly Normal Strength/Tone] : grossly normal strength/tone [Normal Gait] : normal gait [Coordination Grossly Intact] : coordination grossly intact [No Focal Deficits] : no focal deficits [Normal Affect] : the affect was normal [Alert and Oriented x3] : oriented to person, place, and time [Normal Insight/Judgement] : insight and judgment were intact [de-identified] : DECREASED ROM OF RIGHT SHOULDER [de-identified] : POISON IVY BLISTERS LESIONS RIGHT WRIST DORSUM.

## 2019-05-30 NOTE — REVIEW OF SYSTEMS
[Joint Pain] : joint pain [Joint Stiffness] : joint stiffness [Insomnia] : insomnia [Anxiety] : anxiety [Depression] : depression [Negative] : Heme/Lymph [Muscle Pain] : no muscle pain [Back Pain] : no back pain [Suicidal] : not suicidal [FreeTextEntry9] : RIGHT SHOULDER

## 2019-06-27 ENCOUNTER — APPOINTMENT (OUTPATIENT)
Dept: INTERNAL MEDICINE | Facility: CLINIC | Age: 84
End: 2019-06-27
Payer: MEDICARE

## 2019-06-27 ENCOUNTER — NON-APPOINTMENT (OUTPATIENT)
Age: 84
End: 2019-06-27

## 2019-06-27 VITALS — SYSTOLIC BLOOD PRESSURE: 140 MMHG | DIASTOLIC BLOOD PRESSURE: 90 MMHG

## 2019-06-27 VITALS
OXYGEN SATURATION: 97 % | RESPIRATION RATE: 12 BRPM | DIASTOLIC BLOOD PRESSURE: 75 MMHG | BODY MASS INDEX: 20.4 KG/M2 | WEIGHT: 130 LBS | TEMPERATURE: 97.7 F | SYSTOLIC BLOOD PRESSURE: 168 MMHG | HEART RATE: 89 BPM | HEIGHT: 67 IN

## 2019-06-27 DIAGNOSIS — F41.0 PANIC DISORDER [EPISODIC PAROXYSMAL ANXIETY]: ICD-10-CM

## 2019-06-27 PROCEDURE — 99214 OFFICE O/P EST MOD 30 MIN: CPT

## 2019-06-27 RX ORDER — SERTRALINE 25 MG/1
25 TABLET, FILM COATED ORAL
Refills: 0 | Status: COMPLETED | COMMUNITY
End: 2019-06-27

## 2019-06-27 NOTE — PHYSICAL EXAM
[No Acute Distress] : no acute distress [Well Nourished] : well nourished [Well Developed] : well developed [Well-Appearing] : well-appearing [Normal Sclera/Conjunctiva] : normal sclera/conjunctiva [EOMI] : extraocular movements intact [PERRL] : pupils equal round and reactive to light [Normal Outer Ear/Nose] : the outer ears and nose were normal in appearance [Normal Oropharynx] : the oropharynx was normal [Supple] : supple [No JVD] : no jugular venous distention [No Lymphadenopathy] : no lymphadenopathy [Thyroid Normal, No Nodules] : the thyroid was normal and there were no nodules present [No Respiratory Distress] : no respiratory distress  [Clear to Auscultation] : lungs were clear to auscultation bilaterally [Normal Rate] : normal rate  [No Accessory Muscle Use] : no accessory muscle use [Regular Rhythm] : with a regular rhythm [No Murmur] : no murmur heard [Normal S1, S2] : normal S1 and S2 [No Carotid Bruits] : no carotid bruits [No Abdominal Bruit] : a ~M bruit was not heard ~T in the abdomen [No Varicosities] : no varicosities [Pedal Pulses Present] : the pedal pulses are present [No Extremity Clubbing/Cyanosis] : no extremity clubbing/cyanosis [No Edema] : there was no peripheral edema [Soft] : abdomen soft [No Palpable Aorta] : no palpable aorta [Non Tender] : non-tender [Non-distended] : non-distended [No HSM] : no HSM [No Masses] : no abdominal mass palpated [Normal Bowel Sounds] : normal bowel sounds [Normal Posterior Cervical Nodes] : no posterior cervical lymphadenopathy [No CVA Tenderness] : no CVA  tenderness [Normal Anterior Cervical Nodes] : no anterior cervical lymphadenopathy [No Spinal Tenderness] : no spinal tenderness [No Joint Swelling] : no joint swelling [No Rash] : no rash [Grossly Normal Strength/Tone] : grossly normal strength/tone [Normal Gait] : normal gait [Coordination Grossly Intact] : coordination grossly intact [Deep Tendon Reflexes (DTR)] : deep tendon reflexes were 2+ and symmetric [No Focal Deficits] : no focal deficits [Normal Affect] : the affect was normal [Normal Insight/Judgement] : insight and judgment were intact

## 2019-08-28 ENCOUNTER — RX RENEWAL (OUTPATIENT)
Age: 84
End: 2019-08-28

## 2019-09-13 ENCOUNTER — APPOINTMENT (OUTPATIENT)
Dept: INTERNAL MEDICINE | Facility: CLINIC | Age: 84
End: 2019-09-13
Payer: MEDICARE

## 2019-09-13 VITALS — DIASTOLIC BLOOD PRESSURE: 85 MMHG | SYSTOLIC BLOOD PRESSURE: 160 MMHG

## 2019-09-13 VITALS
SYSTOLIC BLOOD PRESSURE: 163 MMHG | OXYGEN SATURATION: 98 % | HEIGHT: 67 IN | RESPIRATION RATE: 12 BRPM | HEART RATE: 86 BPM | BODY MASS INDEX: 19.93 KG/M2 | DIASTOLIC BLOOD PRESSURE: 99 MMHG | TEMPERATURE: 97.4 F | WEIGHT: 127 LBS

## 2019-09-13 DIAGNOSIS — R91.1 SOLITARY PULMONARY NODULE: ICD-10-CM

## 2019-09-13 DIAGNOSIS — H81.49 VERTIGO OF CENTRAL ORIGIN, UNSPECIFIED EAR: ICD-10-CM

## 2019-09-13 PROCEDURE — 99214 OFFICE O/P EST MOD 30 MIN: CPT

## 2019-09-13 RX ORDER — AMPICILLIN 500 MG/1
500 CAPSULE ORAL
Qty: 21 | Refills: 0 | Status: DISCONTINUED | COMMUNITY
Start: 2019-03-04 | End: 2019-09-13

## 2019-09-13 NOTE — PHYSICAL EXAM
[No Acute Distress] : no acute distress [Well Nourished] : well nourished [Well Developed] : well developed [Well-Appearing] : well-appearing [Normal Sclera/Conjunctiva] : normal sclera/conjunctiva [PERRL] : pupils equal round and reactive to light [EOMI] : extraocular movements intact [Normal Outer Ear/Nose] : the outer ears and nose were normal in appearance [Normal Oropharynx] : the oropharynx was normal [Normal TMs] : both tympanic membranes were normal [No JVD] : no jugular venous distention [No Lymphadenopathy] : no lymphadenopathy [Supple] : supple [Thyroid Normal, No Nodules] : the thyroid was normal and there were no nodules present [No Respiratory Distress] : no respiratory distress  [No Accessory Muscle Use] : no accessory muscle use [Clear to Auscultation] : lungs were clear to auscultation bilaterally [Normal Rate] : normal rate  [Regular Rhythm] : with a regular rhythm [Normal S1, S2] : normal S1 and S2 [No Murmur] : no murmur heard [No Carotid Bruits] : no carotid bruits [No Abdominal Bruit] : a ~M bruit was not heard ~T in the abdomen [No Varicosities] : no varicosities [Pedal Pulses Present] : the pedal pulses are present [No Edema] : there was no peripheral edema [No Palpable Aorta] : no palpable aorta [No Extremity Clubbing/Cyanosis] : no extremity clubbing/cyanosis [Soft] : abdomen soft [Non Tender] : non-tender [Non-distended] : non-distended [No Masses] : no abdominal mass palpated [No HSM] : no HSM [Normal Bowel Sounds] : normal bowel sounds [Normal Posterior Cervical Nodes] : no posterior cervical lymphadenopathy [Normal Anterior Cervical Nodes] : no anterior cervical lymphadenopathy [No CVA Tenderness] : no CVA  tenderness [No Spinal Tenderness] : no spinal tenderness [No Joint Swelling] : no joint swelling [Grossly Normal Strength/Tone] : grossly normal strength/tone [No Rash] : no rash [Coordination Grossly Intact] : coordination grossly intact [No Focal Deficits] : no focal deficits [Normal Gait] : normal gait [Deep Tendon Reflexes (DTR)] : deep tendon reflexes were 2+ and symmetric [Normal Affect] : the affect was normal [Normal Insight/Judgement] : insight and judgment were intact [de-identified] : NO NYSTAGMUS

## 2019-09-13 NOTE — REVIEW OF SYSTEMS
[Headache] : headache [Dizziness] : dizziness [Fainting] : fainting [Unsteady Walking] : ataxia [Insomnia] : insomnia [Anxiety] : anxiety [Depression] : depression [Negative] : Heme/Lymph [Confusion] : no confusion [Memory Loss] : no memory loss [Suicidal] : not suicidal [FreeTextEntry4] : TINNITUS

## 2019-09-16 LAB
25(OH)D3 SERPL-MCNC: 33.5 NG/ML
APPEARANCE: CLEAR
BASOPHILS # BLD AUTO: 0.04 K/UL
BASOPHILS NFR BLD AUTO: 0.7 %
BILIRUBIN URINE: NEGATIVE
BLOOD URINE: NORMAL
COLOR: NORMAL
EOSINOPHIL # BLD AUTO: 0.15 K/UL
EOSINOPHIL NFR BLD AUTO: 2.7 %
ESTIMATED AVERAGE GLUCOSE: 114 MG/DL
FERRITIN SERPL-MCNC: 205 NG/ML
FOLATE SERPL-MCNC: >20 NG/ML
GLUCOSE QUALITATIVE U: NEGATIVE
GLUCOSE SERPL-MCNC: 90 MG/DL
HBA1C MFR BLD HPLC: 5.6 %
HCT VFR BLD CALC: 47.6 %
HGB BLD-MCNC: 15.1 G/DL
IMM GRANULOCYTES NFR BLD AUTO: 0.4 %
KETONES URINE: NEGATIVE
LEUKOCYTE ESTERASE URINE: NEGATIVE
LYMPHOCYTES # BLD AUTO: 1.21 K/UL
LYMPHOCYTES NFR BLD AUTO: 22.1 %
MAGNESIUM SERPL-MCNC: 2.3 MG/DL
MAN DIFF?: NORMAL
MCHC RBC-ENTMCNC: 31.5 PG
MCHC RBC-ENTMCNC: 31.7 GM/DL
MCV RBC AUTO: 99.2 FL
MONOCYTES # BLD AUTO: 0.45 K/UL
MONOCYTES NFR BLD AUTO: 8.2 %
NEUTROPHILS # BLD AUTO: 3.6 K/UL
NEUTROPHILS NFR BLD AUTO: 65.9 %
NITRITE URINE: NEGATIVE
PH URINE: 7
PLATELET # BLD AUTO: NORMAL K/UL
PROTEIN URINE: NEGATIVE
RBC # BLD: 4.8 M/UL
RBC # FLD: 13.4 %
SPECIFIC GRAVITY URINE: 1.01
T PALLIDUM AB SER QL IA: NEGATIVE
TSH SERPL-ACNC: 4.16 UIU/ML
UROBILINOGEN URINE: NORMAL
VIT B12 SERPL-MCNC: 1268 PG/ML
WBC # FLD AUTO: 5.47 K/UL

## 2019-10-04 LAB
ALBUMIN SERPL ELPH-MCNC: 4.4 G/DL
ALP BLD-CCNC: 60 U/L
ALT SERPL-CCNC: 46 U/L
ANION GAP SERPL CALC-SCNC: 16 MMOL/L
AST SERPL-CCNC: 48 U/L
BILIRUB SERPL-MCNC: 0.5 MG/DL
BUN SERPL-MCNC: 20 MG/DL
CALCIUM SERPL-MCNC: 9.6 MG/DL
CHLORIDE SERPL-SCNC: 100 MMOL/L
CO2 SERPL-SCNC: 26 MMOL/L
CREAT SERPL-MCNC: 0.93 MG/DL
GLUCOSE SERPL-MCNC: 82 MG/DL
POTASSIUM SERPL-SCNC: 4.4 MMOL/L
PROT SERPL-MCNC: 7.3 G/DL
SODIUM SERPL-SCNC: 142 MMOL/L

## 2019-10-15 ENCOUNTER — APPOINTMENT (OUTPATIENT)
Dept: INTERNAL MEDICINE | Facility: CLINIC | Age: 84
End: 2019-10-15
Payer: MEDICARE

## 2019-10-15 VITALS — DIASTOLIC BLOOD PRESSURE: 90 MMHG | SYSTOLIC BLOOD PRESSURE: 130 MMHG

## 2019-10-15 VITALS
HEIGHT: 67 IN | BODY MASS INDEX: 20.25 KG/M2 | RESPIRATION RATE: 12 BRPM | DIASTOLIC BLOOD PRESSURE: 96 MMHG | TEMPERATURE: 97.6 F | SYSTOLIC BLOOD PRESSURE: 179 MMHG | HEART RATE: 93 BPM | OXYGEN SATURATION: 96 % | WEIGHT: 129 LBS

## 2019-10-15 DIAGNOSIS — I10 ESSENTIAL (PRIMARY) HYPERTENSION: ICD-10-CM

## 2019-10-15 PROCEDURE — 99214 OFFICE O/P EST MOD 30 MIN: CPT

## 2019-10-15 NOTE — PHYSICAL EXAM
[No Acute Distress] : no acute distress [Well Nourished] : well nourished [Well Developed] : well developed [Well-Appearing] : well-appearing [Normal Sclera/Conjunctiva] : normal sclera/conjunctiva [PERRL] : pupils equal round and reactive to light [Normal Outer Ear/Nose] : the outer ears and nose were normal in appearance [Normal Oropharynx] : the oropharynx was normal [EOMI] : extraocular movements intact [No JVD] : no jugular venous distention [Supple] : supple [No Lymphadenopathy] : no lymphadenopathy [No Respiratory Distress] : no respiratory distress  [Thyroid Normal, No Nodules] : the thyroid was normal and there were no nodules present [No Accessory Muscle Use] : no accessory muscle use [Clear to Auscultation] : lungs were clear to auscultation bilaterally [Normal S1, S2] : normal S1 and S2 [Normal Rate] : normal rate  [Regular Rhythm] : with a regular rhythm [No Murmur] : no murmur heard [No Carotid Bruits] : no carotid bruits [No Abdominal Bruit] : a ~M bruit was not heard ~T in the abdomen [No Varicosities] : no varicosities [Pedal Pulses Present] : the pedal pulses are present [No Edema] : there was no peripheral edema [No Palpable Aorta] : no palpable aorta [No Extremity Clubbing/Cyanosis] : no extremity clubbing/cyanosis [Soft] : abdomen soft [Non-distended] : non-distended [Non Tender] : non-tender [No Masses] : no abdominal mass palpated [No HSM] : no HSM [Normal Bowel Sounds] : normal bowel sounds [Normal Anterior Cervical Nodes] : no anterior cervical lymphadenopathy [Normal Posterior Cervical Nodes] : no posterior cervical lymphadenopathy [No CVA Tenderness] : no CVA  tenderness [No Joint Swelling] : no joint swelling [No Spinal Tenderness] : no spinal tenderness [Grossly Normal Strength/Tone] : grossly normal strength/tone [No Rash] : no rash [Coordination Grossly Intact] : coordination grossly intact [Normal Gait] : normal gait [No Focal Deficits] : no focal deficits [Normal Affect] : the affect was normal [Deep Tendon Reflexes (DTR)] : deep tendon reflexes were 2+ and symmetric [Normal Insight/Judgement] : insight and judgment were intact

## 2019-10-15 NOTE — REVIEW OF SYSTEMS
[Sore Throat] : sore throat [Heartburn] : heartburn [Joint Pain] : joint pain [Joint Stiffness] : joint stiffness [Insomnia] : insomnia [Negative] : Heme/Lymph [Earache] : no earache [Hearing Loss] : no hearing loss [Nosebleed] : no nosebleeds [Nasal Discharge] : no nasal discharge [Postnasal Drip] : no postnasal drip [Abdominal Pain] : no abdominal pain [Constipation] : no constipation [Nausea] : no nausea [Diarrhea] : diarrhea [Vomiting] : no vomiting [Melena] : no melena [Joint Swelling] : no joint swelling [Back Pain] : no back pain [Muscle Weakness] : no muscle weakness [Muscle Pain] : no muscle pain [Anxiety] : no anxiety [Suicidal] : not suicidal [Depression] : no depression

## 2019-10-16 LAB
ALBUMIN SERPL ELPH-MCNC: 4.3 G/DL
ALP BLD-CCNC: 57 U/L
ALT SERPL-CCNC: 19 U/L
ANION GAP SERPL CALC-SCNC: 15 MMOL/L
AST SERPL-CCNC: 27 U/L
BILIRUB SERPL-MCNC: 0.5 MG/DL
BUN SERPL-MCNC: 18 MG/DL
CALCIUM SERPL-MCNC: 9.5 MG/DL
CHLORIDE SERPL-SCNC: 103 MMOL/L
CO2 SERPL-SCNC: 24 MMOL/L
CREAT SERPL-MCNC: 0.9 MG/DL
GLUCOSE SERPL-MCNC: 90 MG/DL
POTASSIUM SERPL-SCNC: 3.5 MMOL/L
PROT SERPL-MCNC: 7.2 G/DL
SODIUM SERPL-SCNC: 141 MMOL/L

## 2019-10-18 LAB
CHOLEST SERPL-MCNC: 215 MG/DL
CHOLEST/HDLC SERPL: 3.1 RATIO
HDLC SERPL-MCNC: 70 MG/DL
LDLC SERPL CALC-MCNC: 124 MG/DL
TRIGL SERPL-MCNC: 103 MG/DL

## 2019-12-30 ENCOUNTER — RX RENEWAL (OUTPATIENT)
Age: 84
End: 2019-12-30

## 2020-02-24 ENCOUNTER — APPOINTMENT (OUTPATIENT)
Dept: INTERNAL MEDICINE | Facility: CLINIC | Age: 85
End: 2020-02-24
Payer: MEDICARE

## 2020-02-24 ENCOUNTER — NON-APPOINTMENT (OUTPATIENT)
Age: 85
End: 2020-02-24

## 2020-02-24 VITALS — SYSTOLIC BLOOD PRESSURE: 130 MMHG | DIASTOLIC BLOOD PRESSURE: 85 MMHG

## 2020-02-24 VITALS
RESPIRATION RATE: 12 BRPM | SYSTOLIC BLOOD PRESSURE: 152 MMHG | WEIGHT: 132 LBS | OXYGEN SATURATION: 96 % | HEIGHT: 67 IN | TEMPERATURE: 98 F | DIASTOLIC BLOOD PRESSURE: 72 MMHG | HEART RATE: 94 BPM | BODY MASS INDEX: 20.72 KG/M2

## 2020-02-24 PROCEDURE — 93000 ELECTROCARDIOGRAM COMPLETE: CPT

## 2020-02-24 PROCEDURE — 99397 PER PM REEVAL EST PAT 65+ YR: CPT | Mod: 25

## 2020-02-24 RX ORDER — HYDROCORTISONE 25 MG/G
2.5 CREAM TOPICAL TWICE DAILY
Qty: 1 | Refills: 2 | Status: DISCONTINUED | COMMUNITY
Start: 2019-05-30 | End: 2020-02-24

## 2020-02-24 RX ORDER — FLUTICASONE PROPIONATE 50 UG/1
50 SPRAY, METERED NASAL DAILY
Qty: 1 | Refills: 0 | Status: DISCONTINUED | COMMUNITY
Start: 2019-01-10 | End: 2020-02-24

## 2020-02-24 RX ORDER — ECONAZOLE NITRATE 10 MG/G
1 CREAM TOPICAL TWICE DAILY
Qty: 1 | Refills: 1 | Status: DISCONTINUED | COMMUNITY
Start: 2019-05-30 | End: 2020-02-24

## 2020-02-24 NOTE — PHYSICAL EXAM
[No Acute Distress] : no acute distress [Well Nourished] : well nourished [Well Developed] : well developed [Well-Appearing] : well-appearing [Normal Sclera/Conjunctiva] : normal sclera/conjunctiva [PERRL] : pupils equal round and reactive to light [EOMI] : extraocular movements intact [Normal Outer Ear/Nose] : the outer ears and nose were normal in appearance [Normal Oropharynx] : the oropharynx was normal [No JVD] : no jugular venous distention [No Lymphadenopathy] : no lymphadenopathy [Supple] : supple [Thyroid Normal, No Nodules] : the thyroid was normal and there were no nodules present [No Respiratory Distress] : no respiratory distress  [No Accessory Muscle Use] : no accessory muscle use [Clear to Auscultation] : lungs were clear to auscultation bilaterally [Normal Rate] : normal rate  [Regular Rhythm] : with a regular rhythm [Normal S1, S2] : normal S1 and S2 [No Murmur] : no murmur heard [No Carotid Bruits] : no carotid bruits [No Abdominal Bruit] : a ~M bruit was not heard ~T in the abdomen [No Varicosities] : no varicosities [Pedal Pulses Present] : the pedal pulses are present [No Edema] : there was no peripheral edema [No Palpable Aorta] : no palpable aorta [No Extremity Clubbing/Cyanosis] : no extremity clubbing/cyanosis [Normal Appearance] : normal in appearance [No Nipple Discharge] : no nipple discharge [No Axillary Lymphadenopathy] : no axillary lymphadenopathy [Soft] : abdomen soft [Non Tender] : non-tender [Non-distended] : non-distended [No Masses] : no abdominal mass palpated [No HSM] : no HSM [Normal Bowel Sounds] : normal bowel sounds [Normal Posterior Cervical Nodes] : no posterior cervical lymphadenopathy [Normal Anterior Cervical Nodes] : no anterior cervical lymphadenopathy [No CVA Tenderness] : no CVA  tenderness [No Spinal Tenderness] : no spinal tenderness [No Joint Swelling] : no joint swelling [Grossly Normal Strength/Tone] : grossly normal strength/tone [No Rash] : no rash [Coordination Grossly Intact] : coordination grossly intact [No Focal Deficits] : no focal deficits [Normal Gait] : normal gait [Deep Tendon Reflexes (DTR)] : deep tendon reflexes were 2+ and symmetric [Normal Affect] : the affect was normal [Normal Insight/Judgement] : insight and judgment were intact

## 2020-02-24 NOTE — REVIEW OF SYSTEMS
[Fatigue] : fatigue [Palpitations] : palpitations [Heartburn] : heartburn [Joint Pain] : joint pain [Joint Stiffness] : joint stiffness [Headache] : headache [Insomnia] : insomnia [Negative] : Heme/Lymph [Fever] : no fever [Chills] : no chills [Hot Flashes] : no hot flashes [Night Sweats] : no night sweats [Recent Change In Weight] : ~T no recent weight change [Nosebleed] : no nosebleeds [Postnasal Drip] : no postnasal drip [Chest Pain] : no chest pain [Leg Claudication] : no leg claudication [Lower Ext Edema] : no lower extremity edema [Orthopnea] : no orthopnea [Paroxysmal Nocturnal Dyspnea] : no paroxysmal nocturnal dyspnea [Abdominal Pain] : no abdominal pain [Nausea] : no nausea [Constipation] : no constipation [Diarrhea] : diarrhea [Vomiting] : no vomiting [Melena] : no melena [Joint Swelling] : no joint swelling [Muscle Weakness] : no muscle weakness [Muscle Pain] : no muscle pain [Back Pain] : no back pain [Dizziness] : no dizziness [Fainting] : no fainting [Confusion] : no confusion [Memory Loss] : no memory loss [Unsteady Walking] : no ataxia [Suicidal] : not suicidal [Anxiety] : no anxiety [Depression] : no depression

## 2020-02-25 DIAGNOSIS — R70.0 ELEVATED ERYTHROCYTE SEDIMENTATION RATE: ICD-10-CM

## 2020-02-25 LAB
25(OH)D3 SERPL-MCNC: 39.2 NG/ML
ALBUMIN SERPL ELPH-MCNC: 4.1 G/DL
ALP BLD-CCNC: 66 U/L
ALT SERPL-CCNC: 16 U/L
ANION GAP SERPL CALC-SCNC: 16 MMOL/L
APPEARANCE: CLEAR
AST SERPL-CCNC: 24 U/L
BASOPHILS # BLD AUTO: 0.06 K/UL
BASOPHILS NFR BLD AUTO: 1 %
BILIRUB SERPL-MCNC: 0.5 MG/DL
BILIRUBIN URINE: NEGATIVE
BLOOD URINE: NORMAL
BUN SERPL-MCNC: 20 MG/DL
CALCIUM SERPL-MCNC: 9 MG/DL
CHLORIDE SERPL-SCNC: 102 MMOL/L
CHOLEST SERPL-MCNC: 202 MG/DL
CHOLEST/HDLC SERPL: 2.8 RATIO
CO2 SERPL-SCNC: 23 MMOL/L
COLOR: NORMAL
CREAT SERPL-MCNC: 0.94 MG/DL
CREAT SPEC-SCNC: 75 MG/DL
EOSINOPHIL # BLD AUTO: 0.11 K/UL
EOSINOPHIL NFR BLD AUTO: 1.8 %
ERYTHROCYTE [SEDIMENTATION RATE] IN BLOOD BY WESTERGREN METHOD: 52 MM/HR
ESTIMATED AVERAGE GLUCOSE: 114 MG/DL
FERRITIN SERPL-MCNC: 107 NG/ML
GLUCOSE QUALITATIVE U: NEGATIVE
GLUCOSE SERPL-MCNC: 91 MG/DL
GLUCOSE SERPL-MCNC: 98 MG/DL
HBA1C MFR BLD HPLC: 5.6 %
HCT VFR BLD CALC: 41 %
HDLC SERPL-MCNC: 74 MG/DL
HGB BLD-MCNC: 13.5 G/DL
IMM GRANULOCYTES NFR BLD AUTO: 0.3 %
KETONES URINE: NEGATIVE
LDLC SERPL CALC-MCNC: 105 MG/DL
LEUKOCYTE ESTERASE URINE: NEGATIVE
LYMPHOCYTES # BLD AUTO: 1.22 K/UL
LYMPHOCYTES NFR BLD AUTO: 19.6 %
MAGNESIUM SERPL-MCNC: 2.3 MG/DL
MAN DIFF?: NORMAL
MCHC RBC-ENTMCNC: 31.5 PG
MCHC RBC-ENTMCNC: 32.9 GM/DL
MCV RBC AUTO: 95.6 FL
MICROALBUMIN 24H UR DL<=1MG/L-MCNC: <1.2 MG/DL
MICROALBUMIN/CREAT 24H UR-RTO: NORMAL MG/G
MONOCYTES # BLD AUTO: 0.48 K/UL
MONOCYTES NFR BLD AUTO: 7.7 %
NEUTROPHILS # BLD AUTO: 4.33 K/UL
NEUTROPHILS NFR BLD AUTO: 69.6 %
NITRITE URINE: NEGATIVE
PH URINE: 7.5
PLATELET # BLD AUTO: 182 K/UL
POTASSIUM SERPL-SCNC: 4.1 MMOL/L
PROT SERPL-MCNC: 6.6 G/DL
PROTEIN URINE: NEGATIVE
RBC # BLD: 4.29 M/UL
RBC # FLD: 13.5 %
SODIUM SERPL-SCNC: 141 MMOL/L
SPECIFIC GRAVITY URINE: 1.01
TRIGL SERPL-MCNC: 120 MG/DL
TSH SERPL-ACNC: 5.34 UIU/ML
URATE SERPL-MCNC: 5.7 MG/DL
UROBILINOGEN URINE: NORMAL
VIT B12 SERPL-MCNC: 1793 PG/ML
WBC # FLD AUTO: 6.22 K/UL

## 2020-02-28 LAB — FOLATE SERPL-MCNC: >20 NG/ML

## 2020-03-18 ENCOUNTER — RX RENEWAL (OUTPATIENT)
Age: 85
End: 2020-03-18

## 2020-05-23 ENCOUNTER — EMERGENCY (EMERGENCY)
Facility: HOSPITAL | Age: 85
LOS: 1 days | Discharge: HOME | End: 2020-05-23
Attending: STUDENT IN AN ORGANIZED HEALTH CARE EDUCATION/TRAINING PROGRAM
Payer: COMMERCIAL

## 2020-05-23 VITALS
RESPIRATION RATE: 20 BRPM | SYSTOLIC BLOOD PRESSURE: 185 MMHG | WEIGHT: 130.07 LBS | HEIGHT: 66 IN | OXYGEN SATURATION: 99 % | TEMPERATURE: 98 F | HEART RATE: 81 BPM | DIASTOLIC BLOOD PRESSURE: 108 MMHG

## 2020-05-23 VITALS
TEMPERATURE: 98 F | OXYGEN SATURATION: 100 % | DIASTOLIC BLOOD PRESSURE: 111 MMHG | SYSTOLIC BLOOD PRESSURE: 179 MMHG | RESPIRATION RATE: 20 BRPM | HEART RATE: 80 BPM

## 2020-05-23 LAB
ALBUMIN SERPL ELPH-MCNC: 4.7 G/DL — SIGNIFICANT CHANGE UP (ref 3.3–5)
ALP SERPL-CCNC: 39 U/L — LOW (ref 40–120)
ALT FLD-CCNC: 52 U/L — HIGH (ref 10–45)
ANION GAP SERPL CALC-SCNC: 14 MMOL/L — SIGNIFICANT CHANGE UP (ref 5–17)
ANION GAP SERPL CALC-SCNC: 8 MMOL/L — SIGNIFICANT CHANGE UP (ref 5–17)
AST SERPL-CCNC: 233 U/L — HIGH (ref 10–40)
BASOPHILS # BLD AUTO: 0.04 K/UL — SIGNIFICANT CHANGE UP (ref 0–0.2)
BASOPHILS NFR BLD AUTO: 0.6 % — SIGNIFICANT CHANGE UP (ref 0–2)
BILIRUB SERPL-MCNC: 0.8 MG/DL — SIGNIFICANT CHANGE UP (ref 0.2–1.2)
BUN SERPL-MCNC: 14 MG/DL — SIGNIFICANT CHANGE UP (ref 7–23)
BUN SERPL-MCNC: 15 MG/DL — SIGNIFICANT CHANGE UP (ref 7–23)
CALCIUM SERPL-MCNC: 10 MG/DL — SIGNIFICANT CHANGE UP (ref 8.4–10.5)
CALCIUM SERPL-MCNC: 9.7 MG/DL — SIGNIFICANT CHANGE UP (ref 8.4–10.5)
CHLORIDE SERPL-SCNC: 92 MMOL/L — LOW (ref 96–108)
CHLORIDE SERPL-SCNC: 98 MMOL/L — SIGNIFICANT CHANGE UP (ref 96–108)
CO2 SERPL-SCNC: 25 MMOL/L — SIGNIFICANT CHANGE UP (ref 22–31)
CO2 SERPL-SCNC: 27 MMOL/L — SIGNIFICANT CHANGE UP (ref 22–31)
CREAT SERPL-MCNC: 0.62 MG/DL — SIGNIFICANT CHANGE UP (ref 0.5–1.3)
CREAT SERPL-MCNC: 0.74 MG/DL — SIGNIFICANT CHANGE UP (ref 0.5–1.3)
EOSINOPHIL # BLD AUTO: 0.02 K/UL — SIGNIFICANT CHANGE UP (ref 0–0.5)
EOSINOPHIL NFR BLD AUTO: 0.3 % — SIGNIFICANT CHANGE UP (ref 0–6)
GLUCOSE SERPL-MCNC: 111 MG/DL — HIGH (ref 70–99)
GLUCOSE SERPL-MCNC: 113 MG/DL — HIGH (ref 70–99)
HCT VFR BLD CALC: 48.6 % — HIGH (ref 34.5–45)
HGB BLD-MCNC: 15.6 G/DL — HIGH (ref 11.5–15.5)
IMM GRANULOCYTES NFR BLD AUTO: 0.3 % — SIGNIFICANT CHANGE UP (ref 0–1.5)
LYMPHOCYTES # BLD AUTO: 1.3 K/UL — SIGNIFICANT CHANGE UP (ref 1–3.3)
LYMPHOCYTES # BLD AUTO: 18.4 % — SIGNIFICANT CHANGE UP (ref 13–44)
MCHC RBC-ENTMCNC: 30.7 PG — SIGNIFICANT CHANGE UP (ref 27–34)
MCHC RBC-ENTMCNC: 32.1 GM/DL — SIGNIFICANT CHANGE UP (ref 32–36)
MCV RBC AUTO: 95.7 FL — SIGNIFICANT CHANGE UP (ref 80–100)
MONOCYTES # BLD AUTO: 0.31 K/UL — SIGNIFICANT CHANGE UP (ref 0–0.9)
MONOCYTES NFR BLD AUTO: 4.4 % — SIGNIFICANT CHANGE UP (ref 2–14)
NEUTROPHILS # BLD AUTO: 5.36 K/UL — SIGNIFICANT CHANGE UP (ref 1.8–7.4)
NEUTROPHILS NFR BLD AUTO: 76 % — SIGNIFICANT CHANGE UP (ref 43–77)
NRBC # BLD: 0 /100 WBCS — SIGNIFICANT CHANGE UP (ref 0–0)
PLATELET # BLD AUTO: 194 K/UL — SIGNIFICANT CHANGE UP (ref 150–400)
POTASSIUM SERPL-MCNC: 5.7 MMOL/L — HIGH (ref 3.5–5.3)
POTASSIUM SERPL-MCNC: >9 MMOL/L — CRITICAL HIGH (ref 3.5–5.3)
POTASSIUM SERPL-SCNC: 5.7 MMOL/L — HIGH (ref 3.5–5.3)
POTASSIUM SERPL-SCNC: >9 MMOL/L — CRITICAL HIGH (ref 3.5–5.3)
PROT SERPL-MCNC: 10 G/DL — HIGH (ref 6–8.3)
RBC # BLD: 5.08 M/UL — SIGNIFICANT CHANGE UP (ref 3.8–5.2)
RBC # FLD: 12.9 % — SIGNIFICANT CHANGE UP (ref 10.3–14.5)
SODIUM SERPL-SCNC: 127 MMOL/L — LOW (ref 135–145)
SODIUM SERPL-SCNC: 137 MMOL/L — SIGNIFICANT CHANGE UP (ref 135–145)
TROPONIN T, HIGH SENSITIVITY RESULT: 7 NG/L — SIGNIFICANT CHANGE UP (ref 0–51)
WBC # BLD: 7.05 K/UL — SIGNIFICANT CHANGE UP (ref 3.8–10.5)
WBC # FLD AUTO: 7.05 K/UL — SIGNIFICANT CHANGE UP (ref 3.8–10.5)

## 2020-05-23 PROCEDURE — 71046 X-RAY EXAM CHEST 2 VIEWS: CPT

## 2020-05-23 PROCEDURE — 99285 EMERGENCY DEPT VISIT HI MDM: CPT

## 2020-05-23 PROCEDURE — 85027 COMPLETE CBC AUTOMATED: CPT

## 2020-05-23 PROCEDURE — 93005 ELECTROCARDIOGRAM TRACING: CPT

## 2020-05-23 PROCEDURE — 80048 BASIC METABOLIC PNL TOTAL CA: CPT

## 2020-05-23 PROCEDURE — 84484 ASSAY OF TROPONIN QUANT: CPT

## 2020-05-23 PROCEDURE — 71046 X-RAY EXAM CHEST 2 VIEWS: CPT | Mod: 26

## 2020-05-23 PROCEDURE — 93010 ELECTROCARDIOGRAM REPORT: CPT

## 2020-05-23 PROCEDURE — 70450 CT HEAD/BRAIN W/O DYE: CPT

## 2020-05-23 PROCEDURE — 70450 CT HEAD/BRAIN W/O DYE: CPT | Mod: 26

## 2020-05-23 PROCEDURE — 80053 COMPREHEN METABOLIC PANEL: CPT

## 2020-05-23 PROCEDURE — 99284 EMERGENCY DEPT VISIT MOD MDM: CPT | Mod: 25

## 2020-05-23 PROCEDURE — 80076 HEPATIC FUNCTION PANEL: CPT

## 2020-05-23 RX ORDER — ACETAMINOPHEN 500 MG
650 TABLET ORAL ONCE
Refills: 0 | Status: COMPLETED | OUTPATIENT
Start: 2020-05-23 | End: 2020-05-23

## 2020-05-23 RX ADMIN — Medication 650 MILLIGRAM(S): at 19:18

## 2020-05-23 NOTE — ED PROVIDER NOTE - ATTENDING CONTRIBUTION TO CARE
86 YOF pmh HTN, DM sent from urgent care for headache. Reports fell a few days ago and has had frontal headache intermittently. Denies LOC at that time or any other preceding symptoms. Last night also endorsed mild chest pain that has resolved. Denies f/c, n/v, sob, abd pain, numbness, tingling, weakness. Does not take any blood thinners.  AP - nonfocal neuro exam. headache after fall 5 days ago. well appearing. given cp, will do troponin and ekg. CT r/o traumatic injury. reassess

## 2020-05-23 NOTE — ED ADULT TRIAGE NOTE - HEIGHT IN FEET
Peripheral nerve/Neuraxial procedure note : interscalene  Pre-Procedure  Performed by  COLLETTE FULLER   Location: post-op      Pre-Anesthestic Checklist: patient identified, IV checked, site marked, risks and benefits discussed, informed consent, monitors and equipment checked, pre-op evaluation, at physician/surgeon's request and post-op pain management    Timeout  Correct Patient: Yes   Correct Procedure: Yes   Correct Site: Yes   Correct Laterality: Yes   Correct Position: Yes   Site Marked: Yes   .   Procedure Documentation    Diagnosis:RIGHT SHOULDER PAIN AFTER SURGERY.    Procedure:  right  Interscalene.  Local skin infiltrated with 0.5 mL of 1% lidocaine.     Ultrasound used to identify targeted nerve, plexus, or vascular marker and placed a needle adjacent to it., Ultrasound was used to visualize the spread of the anesthetic in close proximity to the above stated nerve.   Patient Prep;mask, sterile gloves, chlorhexidine gluconate and isopropyl alcohol.  .  Needle: insulated Needle Gauge: 22.  Needle Length (millimeters) 100  Insertion Method: Single Shot.       Assessment/Narrative  Paresthesias: No.  Injection made incrementally with aspirations every 5 mL..  The placement was negative for: blood aspirated, painful injection and site bleeding.  Bolus given via needle..   Secured via.   Complications: none. Test dose of mL at. Test dose negative for signs of intravascular, subdural or intrathecal injection. Comments:  Right ISB augmentation per BV MATILDA Fuller  performed without  noted incident or complications.   Excellent analgesia noted within 5 minutes after start of block.   Pt tolerated the procedure well.   Pain decreased from 5 /10 to 0 /10.  No complications noted.  Will follow if needed.         5

## 2020-05-23 NOTE — ED ADULT TRIAGE NOTE - CHIEF COMPLAINT QUOTE
chest pain for a week and a half, worse last night associated with headache and neck pain. denies cough or fever

## 2020-05-23 NOTE — ED PROVIDER NOTE - PATIENT PORTAL LINK FT
You can access the FollowMyHealth Patient Portal offered by [facility] by registering at the following website: [email or web address]. By joining [facility]’s FollowMyHealth portal, you will also be able to view your health information using other applications (apps) compatible with our system.

## 2020-05-23 NOTE — ED PROVIDER NOTE - MUSCULOSKELETAL, MLM
Spine appears normal, range of motion is not limited, no muscle or joint tenderness 5/5 strength in distal extremities b/l. no meningismus

## 2020-05-23 NOTE — ED PROVIDER NOTE - CHIEF COMPLAINT
The patient is a 86y Female complaining of chest pain. The patient is a 86y Female complaining of headache.

## 2020-05-23 NOTE — ED ADULT NURSE NOTE - OBJECTIVE STATEMENT
86y old female PMH HTN, HLD, Depression, Anxiety walk in from triage c/o headache. A&Ox3. Patient states she fell two days ago, doesn't recall if she hit her head, denies LOC, last night she began having severe headache in frontal and occipital lobe w/ nausea  c/o b/l eye pain and sternal chest pain that resolved. Sent in by urgent care. Patient denies chest pain, sob, ha, n/v/d, abdominal pain, f/c, urinary symptoms, hematuria. Patient is well appearing skin warm and intact, PERRL, EOM intact, sensory intact, equal strength b/l in all upper and lower extremities. IV inserted, cardiac monitor in place and call bell within reach.

## 2020-05-23 NOTE — ED PROVIDER NOTE - OBJECTIVE STATEMENT
86F hx htn, hld, anxiety p/w headache. Patient is a poor historian. Patient states that she woke up with a severe global headache. Daughter clarifies that headache has been present for the past 5 days gradually getting worse. Went to  today and was told to come to the ER for further evaluation and tx. no photophobia, neck stiffness, fevers, vision changes, weakness. Patient states that she also had vague chest pain this morning when she woke up but denies exertional/pleuritic component, cough, fevers, sick contacts. Chest pain is resolved upon ER presentation.

## 2020-05-23 NOTE — ED PROVIDER NOTE - PROGRESS NOTE DETAILS
patient feels better. would like to be discharged, with her son in law picking up. return precautions discussed. pmd f/u

## 2020-05-23 NOTE — ED PROVIDER NOTE - CLINICAL SUMMARY MEDICAL DECISION MAKING FREE TEXT BOX
patient is well appearing, vss. nonfocal exam. unlikely bleed, infectious, gca. low risk cp, unlikely pe. will get labs, ct head, reassess pending workup.

## 2020-05-23 NOTE — ED ADULT NURSE NOTE - INTERVENTIONS DEFINITIONS
Instruct patient to call for assistance/Non-slip footwear when patient is off stretcher/Monitor for mental status changes and reorient to person, place, and time/Monitor gait and stability/Call bell, personal items and telephone within reach/Stretcher in lowest position, wheels locked, appropriate side rails in place/Physically safe environment: no spills, clutter or unnecessary equipment/Provide visual cue, wrist band, yellow gown, etc./Provide visual clues: red socks

## 2020-08-25 ENCOUNTER — LABORATORY RESULT (OUTPATIENT)
Age: 85
End: 2020-08-25

## 2020-08-25 ENCOUNTER — APPOINTMENT (OUTPATIENT)
Dept: INTERNAL MEDICINE | Facility: CLINIC | Age: 85
End: 2020-08-25
Payer: MEDICARE

## 2020-08-25 VITALS — HEIGHT: 67 IN | WEIGHT: 130 LBS | RESPIRATION RATE: 12 BRPM | TEMPERATURE: 97.3 F | BODY MASS INDEX: 20.4 KG/M2

## 2020-08-25 VITALS — HEART RATE: 94 BPM | DIASTOLIC BLOOD PRESSURE: 98 MMHG | OXYGEN SATURATION: 97 % | SYSTOLIC BLOOD PRESSURE: 182 MMHG

## 2020-08-25 VITALS — SYSTOLIC BLOOD PRESSURE: 145 MMHG | DIASTOLIC BLOOD PRESSURE: 80 MMHG

## 2020-08-25 DIAGNOSIS — L23.7 ALLERGIC CONTACT DERMATITIS DUE TO PLANTS, EXCEPT FOOD: ICD-10-CM

## 2020-08-25 PROCEDURE — 99214 OFFICE O/P EST MOD 30 MIN: CPT

## 2020-08-25 RX ORDER — ESCITALOPRAM OXALATE 5 MG/1
5 TABLET ORAL
Qty: 30 | Refills: 3 | Status: DISCONTINUED | COMMUNITY
Start: 2019-06-27 | End: 2020-08-25

## 2020-08-25 NOTE — REVIEW OF SYSTEMS
[Fatigue] : fatigue [Chest Pain] : chest pain [Palpitations] : palpitations [Constipation] : constipation [Heartburn] : heartburn [Joint Pain] : joint pain [Joint Stiffness] : joint stiffness [Skin Rash] : skin rash [Itching] : Itching [Headache] : headache [Insomnia] : insomnia [Anxiety] : anxiety [Depression] : depression [Negative] : Heme/Lymph [Fever] : no fever [Chills] : no chills [Hot Flashes] : no hot flashes [Night Sweats] : no night sweats [Recent Change In Weight] : ~T no recent weight change [Nosebleed] : no nosebleeds [Postnasal Drip] : no postnasal drip [Leg Claudication] : no leg claudication [Lower Ext Edema] : no lower extremity edema [Orthopnea] : no orthopnea [Paroxysmal Nocturnal Dyspnea] : no paroxysmal nocturnal dyspnea [Abdominal Pain] : no abdominal pain [Nausea] : no nausea [Diarrhea] : diarrhea [Vomiting] : no vomiting [Melena] : no melena [Joint Swelling] : no joint swelling [Muscle Weakness] : no muscle weakness [Muscle Pain] : no muscle pain [Back Pain] : no back pain [Dizziness] : no dizziness [Fainting] : no fainting [Memory Loss] : no memory loss [Confusion] : no confusion [Suicidal] : not suicidal [Unsteady Walking] : no ataxia [de-identified] : RIGHT HAND

## 2020-08-25 NOTE — PHYSICAL EXAM
[Well Nourished] : well nourished [No Acute Distress] : no acute distress [Well Developed] : well developed [Well-Appearing] : well-appearing [PERRL] : pupils equal round and reactive to light [Normal Sclera/Conjunctiva] : normal sclera/conjunctiva [EOMI] : extraocular movements intact [Normal Outer Ear/Nose] : the outer ears and nose were normal in appearance [No JVD] : no jugular venous distention [Normal Oropharynx] : the oropharynx was normal [Thyroid Normal, No Nodules] : the thyroid was normal and there were no nodules present [No Lymphadenopathy] : no lymphadenopathy [Supple] : supple [Clear to Auscultation] : lungs were clear to auscultation bilaterally [No Respiratory Distress] : no respiratory distress  [No Accessory Muscle Use] : no accessory muscle use [Normal Rate] : normal rate  [Regular Rhythm] : with a regular rhythm [Normal S1, S2] : normal S1 and S2 [No Murmur] : no murmur heard [No Carotid Bruits] : no carotid bruits [No Abdominal Bruit] : a ~M bruit was not heard ~T in the abdomen [No Varicosities] : no varicosities [Pedal Pulses Present] : the pedal pulses are present [No Edema] : there was no peripheral edema [No Palpable Aorta] : no palpable aorta [Normal Appearance] : normal in appearance [No Extremity Clubbing/Cyanosis] : no extremity clubbing/cyanosis [No Nipple Discharge] : no nipple discharge [No Axillary Lymphadenopathy] : no axillary lymphadenopathy [Non Tender] : non-tender [Soft] : abdomen soft [No HSM] : no HSM [No Masses] : no abdominal mass palpated [Non-distended] : non-distended [Normal Bowel Sounds] : normal bowel sounds [Normal Posterior Cervical Nodes] : no posterior cervical lymphadenopathy [Normal Anterior Cervical Nodes] : no anterior cervical lymphadenopathy [No CVA Tenderness] : no CVA  tenderness [No Spinal Tenderness] : no spinal tenderness [No Joint Swelling] : no joint swelling [Grossly Normal Strength/Tone] : grossly normal strength/tone [No Rash] : no rash [Coordination Grossly Intact] : coordination grossly intact [No Focal Deficits] : no focal deficits [Normal Gait] : normal gait [Deep Tendon Reflexes (DTR)] : deep tendon reflexes were 2+ and symmetric [Normal Insight/Judgement] : insight and judgment were intact [Normal Affect] : the affect was normal

## 2020-08-26 LAB
25(OH)D3 SERPL-MCNC: 39 NG/ML
ALBUMIN SERPL ELPH-MCNC: 4.4 G/DL
ALP BLD-CCNC: 63 U/L
ALT SERPL-CCNC: 16 U/L
ANION GAP SERPL CALC-SCNC: 16 MMOL/L
APPEARANCE: ABNORMAL
AST SERPL-CCNC: 26 U/L
BASOPHILS # BLD AUTO: 0.04 K/UL
BASOPHILS NFR BLD AUTO: 0.6 %
BILIRUB SERPL-MCNC: 0.6 MG/DL
BILIRUBIN URINE: NEGATIVE
BLOOD URINE: NEGATIVE
BUN SERPL-MCNC: 22 MG/DL
CALCIUM SERPL-MCNC: 9.5 MG/DL
CHLORIDE SERPL-SCNC: 100 MMOL/L
CHOLEST SERPL-MCNC: 215 MG/DL
CHOLEST/HDLC SERPL: 3 RATIO
CO2 SERPL-SCNC: 24 MMOL/L
COLOR: NORMAL
CREAT SERPL-MCNC: 1.21 MG/DL
CREAT SPEC-SCNC: 81 MG/DL
EOSINOPHIL # BLD AUTO: 0.18 K/UL
EOSINOPHIL NFR BLD AUTO: 2.6 %
ESTIMATED AVERAGE GLUCOSE: 108 MG/DL
FERRITIN SERPL-MCNC: 219 NG/ML
FOLATE SERPL-MCNC: >20 NG/ML
GLUCOSE QUALITATIVE U: NEGATIVE
GLUCOSE SERPL-MCNC: 101 MG/DL
GLUCOSE SERPL-MCNC: 95 MG/DL
HBA1C MFR BLD HPLC: 5.4 %
HCT VFR BLD CALC: 48.1 %
HDLC SERPL-MCNC: 72 MG/DL
HGB BLD-MCNC: 14.7 G/DL
IMM GRANULOCYTES NFR BLD AUTO: 0.1 %
IRON SERPL-MCNC: 115 UG/DL
KETONES URINE: NEGATIVE
LDLC SERPL CALC-MCNC: 115 MG/DL
LEUKOCYTE ESTERASE URINE: NEGATIVE
LYMPHOCYTES # BLD AUTO: 1.52 K/UL
LYMPHOCYTES NFR BLD AUTO: 21.8 %
MAN DIFF?: NORMAL
MCHC RBC-ENTMCNC: 30.6 GM/DL
MCHC RBC-ENTMCNC: 30.9 PG
MCV RBC AUTO: 101.1 FL
MICROALBUMIN 24H UR DL<=1MG/L-MCNC: <1.2 MG/DL
MICROALBUMIN/CREAT 24H UR-RTO: NORMAL MG/G
MONOCYTES # BLD AUTO: 0.56 K/UL
MONOCYTES NFR BLD AUTO: 8 %
NEUTROPHILS # BLD AUTO: 4.66 K/UL
NEUTROPHILS NFR BLD AUTO: 66.9 %
NITRITE URINE: POSITIVE
PH URINE: 7
PLATELET # BLD AUTO: 212 K/UL
POTASSIUM SERPL-SCNC: 4 MMOL/L
PROT SERPL-MCNC: 7.2 G/DL
PROTEIN URINE: NEGATIVE
RBC # BLD: 4.76 M/UL
RBC # FLD: 13.7 %
SODIUM SERPL-SCNC: 140 MMOL/L
SPECIFIC GRAVITY URINE: 1.01
TRIGL SERPL-MCNC: 141 MG/DL
TSH SERPL-ACNC: 3.33 UIU/ML
UROBILINOGEN URINE: NORMAL
VIT B12 SERPL-MCNC: 1367 PG/ML
WBC # FLD AUTO: 6.97 K/UL

## 2020-09-02 LAB
SARS-COV-2 IGG SERPL IA-ACNC: 0.09 INDEX
SARS-COV-2 IGG SERPL QL IA: NEGATIVE

## 2020-09-28 ENCOUNTER — APPOINTMENT (OUTPATIENT)
Dept: UROLOGY | Facility: CLINIC | Age: 85
End: 2020-09-28
Payer: MEDICARE

## 2020-09-28 VITALS
SYSTOLIC BLOOD PRESSURE: 154 MMHG | BODY MASS INDEX: 21.3 KG/M2 | TEMPERATURE: 97.1 F | HEART RATE: 96 BPM | DIASTOLIC BLOOD PRESSURE: 73 MMHG | WEIGHT: 136 LBS | OXYGEN SATURATION: 97 % | RESPIRATION RATE: 12 BRPM

## 2020-09-28 PROCEDURE — 99204 OFFICE O/P NEW MOD 45 MIN: CPT

## 2020-09-28 NOTE — ASSESSMENT
[FreeTextEntry1] : Very pleasant 86-year-old female with microscopic hematuria\par -urinalysis reviewed\par -urine culture reviewed\par -CT Urogram images from [facility] radiology reviewed demonstrating no etiology for microscopic hematuria\par -cystoscopy; patient reports that she would like to defer this at this time given that she had a cystoscopy performed 2 to 3 years ago.  We discussed risks and benefits of a cystoscopy and after thorough discussion she would like to postpone this.\par -Extensive discussion of the potential etiologies of microscopic hematuria\par -Urine cytology\par -Follow-up in 6 months if cytology is normal; cystoscopy if cytology is abnormal

## 2020-09-28 NOTE — HISTORY OF PRESENT ILLNESS
[FreeTextEntry1] : Very pleasant 86 year old woman who presents for evaluation of microscopic hematuria found on urinalysis approximately 1 month ago and multiple times in the past.  She reports a history of microscopic hematuria for the last 20 years.  She was previously evaluated by Dr. Hart and reports that she underwent a cystoscopy approximately 2 to 3 years ago.  This was reportedly negative.. She reports no history of gross hematuria.  No flank pain. No suprapubic pain. No dysuria.  No urinary frequency, urgency. No history of urinary tract infections or renal impairment. No aggravating or alleviating factors that she knows of contributing to hematuria.\par \par No family history of  malignancy.  No family history of nephrolithiasis.  She never smoked.\par  [Hematuria - Microscopic] : microscopic hematuria

## 2020-09-29 LAB — URINE CYTOLOGY: NORMAL

## 2020-12-01 ENCOUNTER — APPOINTMENT (OUTPATIENT)
Dept: INTERNAL MEDICINE | Facility: CLINIC | Age: 85
End: 2020-12-01
Payer: MEDICARE

## 2020-12-01 ENCOUNTER — NON-APPOINTMENT (OUTPATIENT)
Age: 85
End: 2020-12-01

## 2020-12-01 VITALS
SYSTOLIC BLOOD PRESSURE: 192 MMHG | BODY MASS INDEX: 21.66 KG/M2 | DIASTOLIC BLOOD PRESSURE: 99 MMHG | HEIGHT: 67 IN | WEIGHT: 138 LBS | OXYGEN SATURATION: 95 % | RESPIRATION RATE: 12 BRPM | HEART RATE: 98 BPM | TEMPERATURE: 97.3 F

## 2020-12-01 VITALS — DIASTOLIC BLOOD PRESSURE: 80 MMHG | SYSTOLIC BLOOD PRESSURE: 140 MMHG

## 2020-12-01 VITALS — DIASTOLIC BLOOD PRESSURE: 88 MMHG | SYSTOLIC BLOOD PRESSURE: 152 MMHG

## 2020-12-01 DIAGNOSIS — G56.00 CARPAL TUNNEL SYNDROME, UNSPECIFIED UPPER LIMB: ICD-10-CM

## 2020-12-01 DIAGNOSIS — G44.209 TENSION-TYPE HEADACHE, UNSPECIFIED, NOT INTRACTABLE: ICD-10-CM

## 2020-12-01 DIAGNOSIS — D25.9 LEIOMYOMA OF UTERUS, UNSPECIFIED: ICD-10-CM

## 2020-12-01 DIAGNOSIS — G44.229 CHRONIC TENSION-TYPE HEADACHE, NOT INTRACTABLE: ICD-10-CM

## 2020-12-01 PROCEDURE — 99214 OFFICE O/P EST MOD 30 MIN: CPT | Mod: 25

## 2020-12-01 PROCEDURE — 99072 ADDL SUPL MATRL&STAF TM PHE: CPT

## 2020-12-01 PROCEDURE — 93000 ELECTROCARDIOGRAM COMPLETE: CPT

## 2020-12-01 RX ORDER — MIRTAZAPINE 15 MG/1
15 TABLET, FILM COATED ORAL
Qty: 30 | Refills: 0 | Status: DISCONTINUED | COMMUNITY
End: 2020-12-01

## 2020-12-01 NOTE — PHYSICAL EXAM
[No Acute Distress] : no acute distress [Well Nourished] : well nourished [Well Developed] : well developed [Well-Appearing] : well-appearing [Normal Sclera/Conjunctiva] : normal sclera/conjunctiva [PERRL] : pupils equal round and reactive to light [EOMI] : extraocular movements intact [Normal Outer Ear/Nose] : the outer ears and nose were normal in appearance [Normal Oropharynx] : the oropharynx was normal [No JVD] : no jugular venous distention [No Lymphadenopathy] : no lymphadenopathy [Supple] : supple [Thyroid Normal, No Nodules] : the thyroid was normal and there were no nodules present [No Respiratory Distress] : no respiratory distress  [No Accessory Muscle Use] : no accessory muscle use [Clear to Auscultation] : lungs were clear to auscultation bilaterally [Normal Rate] : normal rate  [Regular Rhythm] : with a regular rhythm [Normal S1, S2] : normal S1 and S2 [No Murmur] : no murmur heard [No Carotid Bruits] : no carotid bruits [No Abdominal Bruit] : a ~M bruit was not heard ~T in the abdomen [No Varicosities] : no varicosities [Pedal Pulses Present] : the pedal pulses are present [No Edema] : there was no peripheral edema [No Palpable Aorta] : no palpable aorta [No Extremity Clubbing/Cyanosis] : no extremity clubbing/cyanosis [Soft] : abdomen soft [Non Tender] : non-tender [Non-distended] : non-distended [No Masses] : no abdominal mass palpated [No HSM] : no HSM [Normal Bowel Sounds] : normal bowel sounds [Normal Posterior Cervical Nodes] : no posterior cervical lymphadenopathy [Normal Anterior Cervical Nodes] : no anterior cervical lymphadenopathy [No CVA Tenderness] : no CVA  tenderness [No Spinal Tenderness] : no spinal tenderness [No Joint Swelling] : no joint swelling [Grossly Normal Strength/Tone] : grossly normal strength/tone [No Rash] : no rash [Coordination Grossly Intact] : coordination grossly intact [No Focal Deficits] : no focal deficits [Normal Gait] : normal gait [Normal Affect] : the affect was normal [Normal Insight/Judgement] : insight and judgment were intact [de-identified] : PARONYCHIA L MIDDLE FINGER

## 2020-12-01 NOTE — REVIEW OF SYSTEMS
[Fatigue] : fatigue [Chest Pain] : chest pain [Palpitations] : palpitations [Constipation] : constipation [Heartburn] : heartburn [Joint Pain] : joint pain [Joint Stiffness] : joint stiffness [Headache] : headache [Insomnia] : insomnia [Anxiety] : anxiety [Depression] : depression [Negative] : Heme/Lymph [Fever] : no fever [Chills] : no chills [Hot Flashes] : no hot flashes [Night Sweats] : no night sweats [Recent Change In Weight] : ~T no recent weight change [Nosebleed] : no nosebleeds [Postnasal Drip] : no postnasal drip [Leg Claudication] : no leg claudication [Lower Ext Edema] : no lower extremity edema [Orthopnea] : no orthopnea [Paroxysmal Nocturnal Dyspnea] : no paroxysmal nocturnal dyspnea [Abdominal Pain] : no abdominal pain [Nausea] : no nausea [Diarrhea] : diarrhea [Vomiting] : no vomiting [Melena] : no melena [Joint Swelling] : no joint swelling [Muscle Weakness] : no muscle weakness [Muscle Pain] : no muscle pain [Back Pain] : no back pain [Itching] : no itching [Skin Rash] : no skin rash [Dizziness] : no dizziness [Fainting] : no fainting [Confusion] : no confusion [Memory Loss] : no memory loss [Unsteady Walking] : no ataxia [Suicidal] : not suicidal [FreeTextEntry2] : SEE HPI  [FreeTextEntry5] : SUDDEN ONSET FLEETING CHEST PAIN ONE EPISODE RADIATING DOWN TO L ANTERIOR THIGH ONE MONTH AGO  [de-identified] : CHRONIC HEADACHES

## 2020-12-01 NOTE — HISTORY OF PRESENT ILLNESS
[de-identified] : PRESENTS FOR FOLLOW UP VISIT. COMPLAINNG OF DEPRESSION, CONSTIPATION (LAST BM THIS MORNING), HEADACHES (CHRONIC) AND PAIN IN HER R THIRD FINGER. SUDDEN ONSET FLEETING CHEST PAIN ONE EPISODE RADIATING DOWN TO L ANTERIOR THIGH ONE MONTH. C/O COLD FEELING IN HER LEGS.

## 2020-12-01 NOTE — COUNSELING
[Fall prevention counseling provided] : Fall prevention counseling provided [de-identified] : DEPRESSION

## 2020-12-01 NOTE — PLAN
[FreeTextEntry1] : Depression- Continue medications (only on clonazepam per patient) and seeing psychiatrist (Dr. Reyes)\par Constipation- continue with colace and miralax as needed. Instructed to keep taking famotidine \par Atypical chest pain- EKG ordered. Symptoms resolved. Cardiology referral made\par Hematuria- Saw urologist. Urine cytology negative. CT urogram with myomatous changes of the uterus. Pelvic US and GYN referral made \par Likely R carpal tunnel- wrist brace \par R third finger paronochyia- has antifungal cream at home she has used before \par \par Patient finds it difficult to follow up as she lives alone and is depressed and needs help getting around.

## 2020-12-03 ENCOUNTER — NON-APPOINTMENT (OUTPATIENT)
Age: 85
End: 2020-12-03

## 2020-12-21 PROBLEM — Z87.09 HISTORY OF ACUTE PHARYNGITIS: Status: RESOLVED | Noted: 2019-01-10 | Resolved: 2020-12-21

## 2020-12-21 PROBLEM — Z87.09 HISTORY OF ACUTE SINUSITIS: Status: RESOLVED | Noted: 2019-01-10 | Resolved: 2020-12-21

## 2020-12-31 ENCOUNTER — APPOINTMENT (OUTPATIENT)
Dept: CARDIOLOGY | Facility: CLINIC | Age: 85
End: 2020-12-31
Payer: MEDICARE

## 2020-12-31 ENCOUNTER — NON-APPOINTMENT (OUTPATIENT)
Age: 85
End: 2020-12-31

## 2020-12-31 VITALS
OXYGEN SATURATION: 97 % | SYSTOLIC BLOOD PRESSURE: 171 MMHG | BODY MASS INDEX: 21.97 KG/M2 | DIASTOLIC BLOOD PRESSURE: 98 MMHG | TEMPERATURE: 96.8 F | HEIGHT: 67 IN | HEART RATE: 96 BPM | WEIGHT: 140 LBS | RESPIRATION RATE: 12 BRPM

## 2020-12-31 VITALS — DIASTOLIC BLOOD PRESSURE: 90 MMHG | SYSTOLIC BLOOD PRESSURE: 140 MMHG

## 2020-12-31 PROCEDURE — 99214 OFFICE O/P EST MOD 30 MIN: CPT

## 2020-12-31 PROCEDURE — 99072 ADDL SUPL MATRL&STAF TM PHE: CPT

## 2020-12-31 NOTE — PHYSICAL EXAM
[General Appearance - Well Developed] : well developed [Normal Appearance] : normal appearance [Well Groomed] : well groomed [General Appearance - Well Nourished] : well nourished [No Deformities] : no deformities [General Appearance - In No Acute Distress] : no acute distress [Normal Conjunctiva] : the conjunctiva exhibited no abnormalities [Eyelids - No Xanthelasma] : the eyelids demonstrated no xanthelasmas [Normal Oral Mucosa] : normal oral mucosa [No Oral Pallor] : no oral pallor [No Oral Cyanosis] : no oral cyanosis [Normal Jugular Venous A Waves Present] : normal jugular venous A waves present [Normal Jugular Venous V Waves Present] : normal jugular venous V waves present [No Jugular Venous Ernandez A Waves] : no jugular venous ernandez A waves [Respiration, Rhythm And Depth] : normal respiratory rhythm and effort [Exaggerated Use Of Accessory Muscles For Inspiration] : no accessory muscle use [Auscultation Breath Sounds / Voice Sounds] : lungs were clear to auscultation bilaterally [Heart Rate And Rhythm] : heart rate and rhythm were normal [Heart Sounds] : normal S1 and S2 [Murmurs] : no murmurs present [Abdomen Soft] : soft [Abdomen Tenderness] : non-tender [Abdomen Mass (___ Cm)] : no abdominal mass palpated [Abnormal Walk] : normal gait [Gait - Sufficient For Exercise Testing] : the gait was sufficient for exercise testing [Nail Clubbing] : no clubbing of the fingernails [Cyanosis, Localized] : no localized cyanosis [Petechial Hemorrhages (___cm)] : no petechial hemorrhages [Skin Color & Pigmentation] : normal skin color and pigmentation [] : no rash [No Venous Stasis] : no venous stasis [Skin Lesions] : no skin lesions [No Skin Ulcers] : no skin ulcer [No Xanthoma] : no  xanthoma was observed [Oriented To Time, Place, And Person] : oriented to person, place, and time [Affect] : the affect was normal [Mood] : the mood was normal [No Anxiety] : not feeling anxious

## 2021-01-02 NOTE — DISCUSSION/SUMMARY
[___ Month(s)] : [unfilled] month(s) [FreeTextEntry1] : The patient is an 86-year-old anxious female history of hypertension, hyperlipidemia, hiatal hernia  with atypical left chest to leg pain and occasional dyspnea.\par #1 CV- no angina\par #2 Htn- on losartan 50mg\par #3 Lipids- on smivastatin\par #4 Anixety- on clonazepam, most likely contributing to symptoms of dyspnea. Reassurance provided.

## 2021-01-02 NOTE — HISTORY OF PRESENT ILLNESS
[FreeTextEntry1] : Gia had an episode of left sided chest pain radiating to left leg while sitting x 2 a few months ago. Sometimes when she walks had a hard time taking a deep breath . Denies any headaches, blurry vision, dizziness or shortness of breath.

## 2021-01-02 NOTE — REVIEW OF SYSTEMS
[Chest Pain] : chest pain [Negative] : Heme/Lymph [Shortness Of Breath] : no shortness of breath [Dyspnea on exertion] : not dyspnea during exertion [Palpitations] : no palpitations

## 2021-01-02 NOTE — REASON FOR VISIT
[Chest Pain] : chest pain [Hyperlipidemia] : hyperlipidemia [Hypertension] : hypertension [Palpitations] : palpitations [Follow-Up - Clinic] : a clinic follow-up of

## 2021-03-02 ENCOUNTER — APPOINTMENT (OUTPATIENT)
Dept: INTERNAL MEDICINE | Facility: CLINIC | Age: 86
End: 2021-03-02
Payer: MEDICARE

## 2021-03-02 ENCOUNTER — LABORATORY RESULT (OUTPATIENT)
Age: 86
End: 2021-03-02

## 2021-03-02 VITALS
RESPIRATION RATE: 12 BRPM | HEIGHT: 67 IN | DIASTOLIC BLOOD PRESSURE: 110 MMHG | SYSTOLIC BLOOD PRESSURE: 179 MMHG | WEIGHT: 139 LBS | BODY MASS INDEX: 21.82 KG/M2 | TEMPERATURE: 96.3 F | OXYGEN SATURATION: 98 % | HEART RATE: 97 BPM

## 2021-03-02 VITALS — DIASTOLIC BLOOD PRESSURE: 90 MMHG | SYSTOLIC BLOOD PRESSURE: 140 MMHG

## 2021-03-02 DIAGNOSIS — M19.011 PRIMARY OSTEOARTHRITIS, RIGHT SHOULDER: ICD-10-CM

## 2021-03-02 PROCEDURE — 99072 ADDL SUPL MATRL&STAF TM PHE: CPT

## 2021-03-02 PROCEDURE — 99214 OFFICE O/P EST MOD 30 MIN: CPT

## 2021-03-02 NOTE — REVIEW OF SYSTEMS
[Fatigue] : fatigue [Palpitations] : palpitations [Constipation] : constipation [Heartburn] : heartburn [Joint Pain] : joint pain [Joint Stiffness] : joint stiffness [Headache] : headache [Insomnia] : insomnia [Anxiety] : anxiety [Depression] : depression [Negative] : Heme/Lymph [Fever] : no fever [Chills] : no chills [Hot Flashes] : no hot flashes [Night Sweats] : no night sweats [Recent Change In Weight] : ~T no recent weight change [Nosebleed] : no nosebleeds [Postnasal Drip] : no postnasal drip [Chest Pain] : no chest pain [Leg Claudication] : no leg claudication [Lower Ext Edema] : no lower extremity edema [Orthopnea] : no orthopnea [Paroxysmal Nocturnal Dyspnea] : no paroxysmal nocturnal dyspnea [Abdominal Pain] : no abdominal pain [Nausea] : no nausea [Diarrhea] : diarrhea [Vomiting] : no vomiting [Melena] : no melena [Joint Swelling] : no joint swelling [Muscle Weakness] : no muscle weakness [Muscle Pain] : no muscle pain [Back Pain] : no back pain [Itching] : no itching [Skin Rash] : no skin rash [Dizziness] : no dizziness [Fainting] : no fainting [Confusion] : no confusion [Memory Loss] : no memory loss [Unsteady Walking] : no ataxia [Suicidal] : not suicidal [FreeTextEntry2] : SEE HPI  [de-identified] : CHRONIC HEADACHES

## 2021-03-03 LAB
25(OH)D3 SERPL-MCNC: 42.7 NG/ML
BASOPHILS # BLD AUTO: 0.05 K/UL
BASOPHILS NFR BLD AUTO: 0.7 %
CHOLEST SERPL-MCNC: 232 MG/DL
EOSINOPHIL # BLD AUTO: 0.06 K/UL
EOSINOPHIL NFR BLD AUTO: 0.8 %
ESTIMATED AVERAGE GLUCOSE: 114 MG/DL
FERRITIN SERPL-MCNC: 111 NG/ML
FOLATE SERPL-MCNC: >20 NG/ML
GLUCOSE SERPL-MCNC: 94 MG/DL
HBA1C MFR BLD HPLC: 5.6 %
HCT VFR BLD CALC: 46.7 %
HDLC SERPL-MCNC: 85 MG/DL
HGB BLD-MCNC: 15.3 G/DL
IMM GRANULOCYTES NFR BLD AUTO: 0.4 %
IRON SERPL-MCNC: 100 UG/DL
LDLC SERPL CALC-MCNC: 125 MG/DL
LYMPHOCYTES # BLD AUTO: 1.12 K/UL
LYMPHOCYTES NFR BLD AUTO: 15.4 %
MAN DIFF?: NORMAL
MCHC RBC-ENTMCNC: 31.6 PG
MCHC RBC-ENTMCNC: 32.8 GM/DL
MCV RBC AUTO: 96.5 FL
MONOCYTES # BLD AUTO: 0.51 K/UL
MONOCYTES NFR BLD AUTO: 7 %
NEUTROPHILS # BLD AUTO: 5.51 K/UL
NEUTROPHILS NFR BLD AUTO: 75.7 %
NONHDLC SERPL-MCNC: 147 MG/DL
PLATELET # BLD AUTO: 211 K/UL
RBC # BLD: 4.84 M/UL
RBC # FLD: 13.6 %
TRIGL SERPL-MCNC: 110 MG/DL
TSH SERPL-ACNC: 5.09 UIU/ML
VIT B12 SERPL-MCNC: 1288 PG/ML
WBC # FLD AUTO: 7.28 K/UL

## 2021-03-05 DIAGNOSIS — R82.79 OTHER ABNORMAL FINDINGS ON MICROBIOLOGICAL EXAMINATION OF URINE: ICD-10-CM

## 2021-03-05 LAB — BACTERIA UR CULT: ABNORMAL

## 2021-03-08 LAB
APPEARANCE: CLEAR
BACTERIA: NEGATIVE
BILIRUBIN URINE: NEGATIVE
BLOOD URINE: NEGATIVE
COLOR: COLORLESS
GLUCOSE QUALITATIVE U: NEGATIVE
HYALINE CASTS: 0 /LPF
KETONES URINE: NEGATIVE
LEUKOCYTE ESTERASE URINE: NEGATIVE
MICROSCOPIC-UA: NORMAL
NITRITE URINE: NEGATIVE
PH URINE: 7.5
PROTEIN URINE: NEGATIVE
RED BLOOD CELLS URINE: 1 /HPF
SPECIFIC GRAVITY URINE: 1.01
SQUAMOUS EPITHELIAL CELLS: 0 /HPF
UROBILINOGEN URINE: NORMAL
WHITE BLOOD CELLS URINE: 0 /HPF

## 2021-03-29 ENCOUNTER — APPOINTMENT (OUTPATIENT)
Dept: UROLOGY | Facility: CLINIC | Age: 86
End: 2021-03-29
Payer: MEDICARE

## 2021-03-29 VITALS
DIASTOLIC BLOOD PRESSURE: 102 MMHG | HEART RATE: 74 BPM | RESPIRATION RATE: 12 BRPM | SYSTOLIC BLOOD PRESSURE: 185 MMHG | TEMPERATURE: 96.8 F | HEIGHT: 67 IN | OXYGEN SATURATION: 97 %

## 2021-03-29 PROCEDURE — 99072 ADDL SUPL MATRL&STAF TM PHE: CPT

## 2021-03-29 PROCEDURE — 99214 OFFICE O/P EST MOD 30 MIN: CPT

## 2021-03-29 NOTE — ASSESSMENT
[FreeTextEntry1] : Very pleasant 86-year-old female with microscopic hematuria, acute UTI, uretrhal stricture\par -urinalysis reviewed-1 RBC/hpf\par -urine culture reviewed- E. colit UTI\par -CT Urogram images from [facility] radiology reviewed demonstrating no etiology for microscopic hematuria\par -Urine cytology- negative 9/2020\par -discussed general preventative strategies for UTIs\par -f/u in 3 months with repeat UCx\par -discussed options for diagnosis and management of urethral stricture-she would like to forego cystoscopy for diagnosis at this time as she feels well

## 2021-03-29 NOTE — HISTORY OF PRESENT ILLNESS
[FreeTextEntry1] : Very pleasant 86 year old woman who presents for follow up of microscopic hematuria and an acute UTI. She reports a history of microscopic hematuria for the last 20 years.  She was previously evaluated by Dr. Hart and reports that she underwent a cystoscopy approximately 2 to 3 years ago.  This was reportedly negative. She reports no history of gross hematuria.  No flank pain. No suprapubic pain. No dysuria.  No urinary frequency, urgency. Recent UCx from 3/2/2021 showed E. coli UTI.

## 2021-05-06 ENCOUNTER — APPOINTMENT (OUTPATIENT)
Dept: INTERNAL MEDICINE | Facility: CLINIC | Age: 86
End: 2021-05-06
Payer: MEDICARE

## 2021-05-06 VITALS — DIASTOLIC BLOOD PRESSURE: 85 MMHG | SYSTOLIC BLOOD PRESSURE: 140 MMHG

## 2021-05-06 VITALS
WEIGHT: 139 LBS | HEIGHT: 67 IN | OXYGEN SATURATION: 98 % | SYSTOLIC BLOOD PRESSURE: 168 MMHG | TEMPERATURE: 97.3 F | BODY MASS INDEX: 21.82 KG/M2 | DIASTOLIC BLOOD PRESSURE: 90 MMHG | HEART RATE: 92 BPM | RESPIRATION RATE: 12 BRPM

## 2021-05-06 DIAGNOSIS — R73.03 PREDIABETES.: ICD-10-CM

## 2021-05-06 DIAGNOSIS — G56.21 LESION OF ULNAR NERVE, RIGHT UPPER LIMB: ICD-10-CM

## 2021-05-06 DIAGNOSIS — F41.0 PANIC DISORDER [EPISODIC PAROXYSMAL ANXIETY]: ICD-10-CM

## 2021-05-06 PROCEDURE — 99072 ADDL SUPL MATRL&STAF TM PHE: CPT

## 2021-05-06 PROCEDURE — 99214 OFFICE O/P EST MOD 30 MIN: CPT

## 2021-05-06 RX ORDER — CIPROFLOXACIN HYDROCHLORIDE 500 MG/1
500 TABLET, FILM COATED ORAL
Qty: 6 | Refills: 0 | Status: DISCONTINUED | COMMUNITY
Start: 2021-03-05 | End: 2021-05-06

## 2021-05-06 NOTE — PHYSICAL EXAM
[Well Nourished] : well nourished [Well Developed] : well developed [Well-Appearing] : well-appearing [Normal Sclera/Conjunctiva] : normal sclera/conjunctiva [PERRL] : pupils equal round and reactive to light [EOMI] : extraocular movements intact [Normal Outer Ear/Nose] : the outer ears and nose were normal in appearance [Normal Oropharynx] : the oropharynx was normal [No JVD] : no jugular venous distention [No Lymphadenopathy] : no lymphadenopathy [Supple] : supple [Thyroid Normal, No Nodules] : the thyroid was normal and there were no nodules present [No Respiratory Distress] : no respiratory distress  [No Accessory Muscle Use] : no accessory muscle use [Clear to Auscultation] : lungs were clear to auscultation bilaterally [Normal Rate] : normal rate  [Regular Rhythm] : with a regular rhythm [Normal S1, S2] : normal S1 and S2 [No Murmur] : no murmur heard [No Carotid Bruits] : no carotid bruits [No Abdominal Bruit] : a ~M bruit was not heard ~T in the abdomen [No Varicosities] : no varicosities [Pedal Pulses Present] : the pedal pulses are present [No Edema] : there was no peripheral edema [No Palpable Aorta] : no palpable aorta [No Extremity Clubbing/Cyanosis] : no extremity clubbing/cyanosis [Soft] : abdomen soft [Non Tender] : non-tender [Non-distended] : non-distended [No Masses] : no abdominal mass palpated [No HSM] : no HSM [Normal Bowel Sounds] : normal bowel sounds [Normal Posterior Cervical Nodes] : no posterior cervical lymphadenopathy [Normal Anterior Cervical Nodes] : no anterior cervical lymphadenopathy [No CVA Tenderness] : no CVA  tenderness [No Spinal Tenderness] : no spinal tenderness [No Joint Swelling] : no joint swelling [Grossly Normal Strength/Tone] : grossly normal strength/tone [No Rash] : no rash [Coordination Grossly Intact] : coordination grossly intact [No Focal Deficits] : no focal deficits [Normal Gait] : normal gait [Normal Affect] : the affect was normal [Normal Insight/Judgement] : insight and judgment were intact [FreeTextEntry1] : DONE BY HER GYN , SEE NOTE. [de-identified] : PARONYCHIA L MIDDLE FINGER

## 2021-05-06 NOTE — REVIEW OF SYSTEMS
[Fatigue] : fatigue [Palpitations] : palpitations [Constipation] : constipation [Heartburn] : heartburn [Joint Pain] : joint pain [Joint Stiffness] : joint stiffness [Headache] : headache [Insomnia] : insomnia [Anxiety] : anxiety [Depression] : depression [Negative] : Heme/Lymph [Fever] : no fever [Chills] : no chills [Hot Flashes] : no hot flashes [Night Sweats] : no night sweats [Recent Change In Weight] : ~T no recent weight change [Nosebleed] : no nosebleeds [Postnasal Drip] : no postnasal drip [Chest Pain] : no chest pain [Leg Claudication] : no leg claudication [Lower Ext Edema] : no lower extremity edema [Orthopnea] : no orthopnea [Paroxysmal Nocturnal Dyspnea] : no paroxysmal nocturnal dyspnea [Abdominal Pain] : no abdominal pain [Nausea] : no nausea [Diarrhea] : diarrhea [Vomiting] : no vomiting [Melena] : no melena [Joint Swelling] : no joint swelling [Muscle Weakness] : no muscle weakness [Muscle Pain] : no muscle pain [Back Pain] : no back pain [Itching] : no itching [Skin Rash] : no skin rash [Dizziness] : no dizziness [Fainting] : no fainting [Confusion] : no confusion [Memory Loss] : no memory loss [Unsteady Walking] : no ataxia [Suicidal] : not suicidal [FreeTextEntry2] : SEE HPI  [de-identified] : CHRONIC HEADACHES

## 2021-05-07 ENCOUNTER — APPOINTMENT (OUTPATIENT)
Dept: CARDIOLOGY | Facility: CLINIC | Age: 86
End: 2021-05-07
Payer: MEDICARE

## 2021-05-07 VITALS
OXYGEN SATURATION: 96 % | DIASTOLIC BLOOD PRESSURE: 83 MMHG | BODY MASS INDEX: 21.66 KG/M2 | HEIGHT: 67 IN | HEART RATE: 88 BPM | SYSTOLIC BLOOD PRESSURE: 165 MMHG | TEMPERATURE: 97.1 F | WEIGHT: 138 LBS

## 2021-05-07 DIAGNOSIS — G56.01 CARPAL TUNNEL SYNDROME, RIGHT UPPER LIMB: ICD-10-CM

## 2021-05-07 PROCEDURE — 99072 ADDL SUPL MATRL&STAF TM PHE: CPT

## 2021-05-07 PROCEDURE — 99203 OFFICE O/P NEW LOW 30 MIN: CPT

## 2021-05-07 NOTE — ASSESSMENT
[FreeTextEntry1] : Assessment\par 1.  Bilateral hand pains R>L\par - she has bounding radial artery pulse\par - normal allens test\par - no subclavian bruits\par - no carotid bruits\par - strong brachial pulses\par 2.  Normal pedal pulses\par \par Plan\par 1.  Doubt vascular cause of her symptoms\par 2.  She has nocturnal hand pains and numbness.  Can consider hand specailist eval \par 3.  Is this carpal tunnel vs cervical spine dz?\par 4.  Followup as needed. \par 5.  No need for any vascular testing at this time.  \par 6.  Return PRN

## 2021-05-07 NOTE — REASON FOR VISIT
[Initial Evaluation] : an initial evaluation of [FreeTextEntry1] : 5/7/2021\par She is her for vascular  evaluation of hand pains.  Numbness.  This started 3-4 months ago.\par When she sleeps its worse.  She is right handed.  She has numbness in the right 3rd and 2ndfingers.\par Her toes are not affected.  Points the a small raised area on the R palm.  Points to arthritis areas of the hand.  \par no ulceration on the feet\par She has no claudication.  \par \par She has a strong palpabe radial pulse bilaterally\par Anormal jad's test bilaterally\par She has bounding right DP and left PT\par \par \par

## 2021-05-07 NOTE — PHYSICAL EXAM
[General Appearance - Well Developed] : well developed [Normal Appearance] : normal appearance [Well Groomed] : well groomed [General Appearance - Well Nourished] : well nourished [No Deformities] : no deformities [General Appearance - In No Acute Distress] : no acute distress [Normal Conjunctiva] : the conjunctiva exhibited no abnormalities [Eyelids - No Xanthelasma] : the eyelids demonstrated no xanthelasmas [Normal Oral Mucosa] : normal oral mucosa [No Oral Pallor] : no oral pallor [No Oral Cyanosis] : no oral cyanosis [Normal Jugular Venous A Waves Present] : normal jugular venous A waves present [Normal Jugular Venous V Waves Present] : normal jugular venous V waves present [No Jugular Venous Ernandez A Waves] : no jugular venous ernandez A waves [Heart Rate And Rhythm] : heart rate and rhythm were normal [Heart Sounds] : normal S1 and S2 [Murmurs] : no murmurs present [Respiration, Rhythm And Depth] : normal respiratory rhythm and effort [Exaggerated Use Of Accessory Muscles For Inspiration] : no accessory muscle use [Auscultation Breath Sounds / Voice Sounds] : lungs were clear to auscultation bilaterally [Nail Clubbing] : no clubbing of the fingernails [Cyanosis, Localized] : no localized cyanosis [Petechial Hemorrhages (___cm)] : no petechial hemorrhages [Skin Color & Pigmentation] : normal skin color and pigmentation [No Venous Stasis] : no venous stasis [Skin Lesions] : no skin lesions [No Skin Ulcers] : no skin ulcer [No Xanthoma] : no  xanthoma was observed [Oriented To Time, Place, And Person] : oriented to person, place, and time [Affect] : the affect was normal [Mood] : the mood was normal [No Anxiety] : not feeling anxious [Abdomen Soft] : soft [Abdomen Tenderness] : non-tender [] : no hepato-splenomegaly [Abdomen Mass (___ Cm)] : no abdominal mass palpated [Abnormal Walk] : normal gait [Gait - Sufficient For Exercise Testing] : the gait was sufficient for exercise testing [FreeTextEntry1] : strong pedal pulses.  normal allens test bilaterally. strong radial pulse.  no subclavian bruits.  no carotid bruits

## 2021-05-08 LAB
APPEARANCE: CLEAR
BACTERIA: ABNORMAL
BILIRUBIN URINE: NEGATIVE
BLOOD URINE: ABNORMAL
COLOR: COLORLESS
GLUCOSE QUALITATIVE U: NEGATIVE
HYALINE CASTS: 0 /LPF
KETONES URINE: NEGATIVE
LEUKOCYTE ESTERASE URINE: ABNORMAL
MICROSCOPIC-UA: NORMAL
NITRITE URINE: NEGATIVE
PH URINE: 6.5
PROTEIN URINE: NEGATIVE
RED BLOOD CELLS URINE: 1 /HPF
SPECIFIC GRAVITY URINE: 1
SQUAMOUS EPITHELIAL CELLS: 1 /HPF
UROBILINOGEN URINE: NORMAL
WHITE BLOOD CELLS URINE: 15 /HPF

## 2021-05-10 DIAGNOSIS — N30.01 ACUTE CYSTITIS WITH HEMATURIA: ICD-10-CM

## 2021-06-07 ENCOUNTER — APPOINTMENT (OUTPATIENT)
Dept: UROLOGY | Facility: CLINIC | Age: 86
End: 2021-06-07
Payer: MEDICARE

## 2021-06-07 VITALS
TEMPERATURE: 97.5 F | DIASTOLIC BLOOD PRESSURE: 87 MMHG | RESPIRATION RATE: 12 BRPM | BODY MASS INDEX: 21.66 KG/M2 | WEIGHT: 138 LBS | HEART RATE: 87 BPM | OXYGEN SATURATION: 97 % | HEIGHT: 67 IN | SYSTOLIC BLOOD PRESSURE: 156 MMHG

## 2021-06-07 DIAGNOSIS — R31.29 OTHER MICROSCOPIC HEMATURIA: ICD-10-CM

## 2021-06-07 DIAGNOSIS — N39.0 URINARY TRACT INFECTION, SITE NOT SPECIFIED: ICD-10-CM

## 2021-06-07 DIAGNOSIS — N35.919 UNSPECIFIED URETHRAL STRICTURE, MALE, UNSPECIFIED SITE: ICD-10-CM

## 2021-06-07 PROCEDURE — 99072 ADDL SUPL MATRL&STAF TM PHE: CPT

## 2021-06-07 PROCEDURE — 99214 OFFICE O/P EST MOD 30 MIN: CPT

## 2021-06-07 NOTE — ASSESSMENT
[FreeTextEntry1] : Very pleasant 87-year-old woman who presents for follow-up of microscopic hematuria and recurrent urinary tract infections\par -We discussed pathophysiology of recurrent urinary tract infections in postmenopausal women\par -Most recent urine culture reviewed demonstrating E. coli UTI\par -Prior CT urogram reviewed demonstrating no etiology of microscopic hematuria and recurrent urinary tract infections\par -Urine cytology from 9/2020 reviewed–negative\par -We again discussed general preventative strategies for urinary tract infections\par -Given that she has had multiple urinary tract infections now we discussed prophylaxis against urinary tract infections\par -Start Azo cranberry\par -We discussed option to start either Estrace vaginal cream or Hip-Kevin and at this time she would like to avoid this\par -I suggested another urine culture, however she would like to forego this today\par -We again discussed the importance of evaluation with a cystoscopy however given that she had this performed 3 years ago she would like to forego this at this time\par -Follow-up in 6 months

## 2021-06-07 NOTE — HISTORY OF PRESENT ILLNESS
[FreeTextEntry1] : Very pleasant 87 year old woman who presents for follow up of microscopic hematuria and an recurrent UTIs. She reports a history of microscopic hematuria for the last 20 years.  She was previously evaluated by Dr. Hart and reports that she underwent a cystoscopy approximately 2 to 3 years ago.  This was reportedly negative. She reports no history of gross hematuria.  No flank pain. No suprapubic pain. No dysuria.  No urinary frequency, urgency. Recent UCx from 3/2/2021 showed E. coli UTI.  Follow-up urine culture grew E. coli for which she was treated with Cipro.

## 2021-06-13 NOTE — ED PROVIDER NOTE - ST/T WAVE
Kat Wing is a 17 y.o. female here for Nexplanon removal and reinsertion.  Her Nexplanon is due for removal.  She has no questions or concerns today.    Vitals:    10/11/17 1520   BP: 116/72   Pulse: 72     Alert, oriented, not in any acute cardiorespiratory distress    Procedure note:  Counselling was done.  Side effects of Nexplanon again were reviewed with the patient.  Removal procedure techniques were reviewed with the patient.  Informed consent was obtained.  The area of the arm was cleaned with alcohol swab then anesthetized with lidocaine with epineprine.  A small incision was made with an 11 inch blade.  Then Nexplanon was palpated, held by a curved hemostat, and expressed outward towards the incision site.  The Nexplanon was completely removed.  The new Nexplanon was then inserted 8cm above the elbow crease.  Patient tolerated the procedure.  Palpation revealed subdermal placement.  Patient was able to feel the implant as well.  Bleeding was minimal and a pressure dressing was applied with instructions to leave this in place for 24-48 hours.  Patient was instructed to observe for signs and symptoms of any infection (localized tenderness, pain, inflammation) or excessive bruising.        
no st changes

## 2021-08-02 ENCOUNTER — RX RENEWAL (OUTPATIENT)
Age: 86
End: 2021-08-02

## 2021-10-12 ENCOUNTER — APPOINTMENT (OUTPATIENT)
Dept: INTERNAL MEDICINE | Facility: CLINIC | Age: 86
End: 2021-10-12
Payer: MEDICARE

## 2021-10-12 VITALS — DIASTOLIC BLOOD PRESSURE: 90 MMHG | SYSTOLIC BLOOD PRESSURE: 140 MMHG

## 2021-10-12 VITALS
SYSTOLIC BLOOD PRESSURE: 199 MMHG | BODY MASS INDEX: 20.4 KG/M2 | RESPIRATION RATE: 12 BRPM | TEMPERATURE: 97.3 F | HEIGHT: 67 IN | OXYGEN SATURATION: 96 % | WEIGHT: 130 LBS | HEART RATE: 96 BPM | DIASTOLIC BLOOD PRESSURE: 105 MMHG

## 2021-10-12 DIAGNOSIS — I49.3 VENTRICULAR PREMATURE DEPOLARIZATION: ICD-10-CM

## 2021-10-12 DIAGNOSIS — Z23 ENCOUNTER FOR IMMUNIZATION: ICD-10-CM

## 2021-10-12 DIAGNOSIS — Z00.00 ENCOUNTER FOR GENERAL ADULT MEDICAL EXAMINATION W/OUT ABNORMAL FINDINGS: ICD-10-CM

## 2021-10-12 PROCEDURE — 99397 PER PM REEVAL EST PAT 65+ YR: CPT | Mod: 25

## 2021-10-12 PROCEDURE — 93000 ELECTROCARDIOGRAM COMPLETE: CPT

## 2021-10-12 NOTE — PHYSICAL EXAM
[Well Nourished] : well nourished [Well Developed] : well developed [Well-Appearing] : well-appearing [Normal Sclera/Conjunctiva] : normal sclera/conjunctiva [PERRL] : pupils equal round and reactive to light [EOMI] : extraocular movements intact [Normal Outer Ear/Nose] : the outer ears and nose were normal in appearance [Normal Oropharynx] : the oropharynx was normal [No JVD] : no jugular venous distention [No Lymphadenopathy] : no lymphadenopathy [Supple] : supple [Thyroid Normal, No Nodules] : the thyroid was normal and there were no nodules present [No Respiratory Distress] : no respiratory distress  [No Accessory Muscle Use] : no accessory muscle use [Clear to Auscultation] : lungs were clear to auscultation bilaterally [Normal Rate] : normal rate  [Regular Rhythm] : with a regular rhythm [Normal S1, S2] : normal S1 and S2 [No Murmur] : no murmur heard [No Carotid Bruits] : no carotid bruits [No Abdominal Bruit] : a ~M bruit was not heard ~T in the abdomen [No Varicosities] : no varicosities [Pedal Pulses Present] : the pedal pulses are present [No Edema] : there was no peripheral edema [No Palpable Aorta] : no palpable aorta [No Extremity Clubbing/Cyanosis] : no extremity clubbing/cyanosis [Normal Appearance] : normal in appearance [No Nipple Discharge] : no nipple discharge [No Axillary Lymphadenopathy] : no axillary lymphadenopathy [Soft] : abdomen soft [Non Tender] : non-tender [Non-distended] : non-distended [No Masses] : no abdominal mass palpated [No HSM] : no HSM [Normal Bowel Sounds] : normal bowel sounds [Normal Posterior Cervical Nodes] : no posterior cervical lymphadenopathy [Normal Anterior Cervical Nodes] : no anterior cervical lymphadenopathy [No CVA Tenderness] : no CVA  tenderness [No Spinal Tenderness] : no spinal tenderness [No Joint Swelling] : no joint swelling [Grossly Normal Strength/Tone] : grossly normal strength/tone [No Rash] : no rash [Coordination Grossly Intact] : coordination grossly intact [No Focal Deficits] : no focal deficits [Normal Gait] : normal gait [Normal Affect] : the affect was normal [Normal Insight/Judgement] : insight and judgment were intact [de-identified] : PARONYCHIA L MIDDLE FINGER  [FreeTextEntry1] : DONE BY HER GYN , SEE NOTE.

## 2021-10-12 NOTE — REVIEW OF SYSTEMS
[Fatigue] : fatigue [Palpitations] : palpitations [Constipation] : constipation [Heartburn] : heartburn [Joint Pain] : joint pain [Joint Stiffness] : joint stiffness [Headache] : headache [Insomnia] : insomnia [Anxiety] : anxiety [Depression] : depression [Negative] : Heme/Lymph [Fever] : no fever [Chills] : no chills [Hot Flashes] : no hot flashes [Night Sweats] : no night sweats [Recent Change In Weight] : ~T no recent weight change [Nosebleed] : no nosebleeds [Postnasal Drip] : no postnasal drip [Chest Pain] : no chest pain [Leg Claudication] : no leg claudication [Lower Ext Edema] : no lower extremity edema [Orthopnea] : no orthopnea [Paroxysmal Nocturnal Dyspnea] : no paroxysmal nocturnal dyspnea [Abdominal Pain] : no abdominal pain [Nausea] : no nausea [Diarrhea] : diarrhea [Vomiting] : no vomiting [Melena] : no melena [Joint Swelling] : no joint swelling [Muscle Weakness] : no muscle weakness [Muscle Pain] : no muscle pain [Back Pain] : no back pain [Itching] : no itching [Skin Rash] : no skin rash [Dizziness] : no dizziness [Fainting] : no fainting [Confusion] : no confusion [Memory Loss] : no memory loss [Unsteady Walking] : no ataxia [Suicidal] : not suicidal [FreeTextEntry2] : SEE HPI  [de-identified] : CHRONIC HEADACHES

## 2021-10-13 LAB
25(OH)D3 SERPL-MCNC: 43.5 NG/ML
ALBUMIN SERPL ELPH-MCNC: 4.4 G/DL
ALP BLD-CCNC: 68 U/L
ALT SERPL-CCNC: 15 U/L
ANION GAP SERPL CALC-SCNC: 17 MMOL/L
AST SERPL-CCNC: 28 U/L
BILIRUB SERPL-MCNC: 0.7 MG/DL
BUN SERPL-MCNC: 18 MG/DL
CALCIUM SERPL-MCNC: 9.7 MG/DL
CHLORIDE SERPL-SCNC: 99 MMOL/L
CHOLEST SERPL-MCNC: 245 MG/DL
CO2 SERPL-SCNC: 24 MMOL/L
CREAT SERPL-MCNC: 0.95 MG/DL
ESTIMATED AVERAGE GLUCOSE: 114 MG/DL
FERRITIN SERPL-MCNC: 160 NG/ML
FOLATE SERPL-MCNC: >20 NG/ML
GLUCOSE SERPL-MCNC: 81 MG/DL
GLUCOSE SERPL-MCNC: 99 MG/DL
HBA1C MFR BLD HPLC: 5.6 %
HDLC SERPL-MCNC: 77 MG/DL
LDLC SERPL CALC-MCNC: 149 MG/DL
MAGNESIUM SERPL-MCNC: 2.4 MG/DL
NONHDLC SERPL-MCNC: 168 MG/DL
POTASSIUM SERPL-SCNC: 4 MMOL/L
PROT SERPL-MCNC: 7.7 G/DL
SODIUM SERPL-SCNC: 140 MMOL/L
T4 FREE SERPL-MCNC: 1.2 NG/DL
TRIGL SERPL-MCNC: 96 MG/DL
TSH SERPL-ACNC: 5.03 UIU/ML
VIT B12 SERPL-MCNC: 917 PG/ML

## 2021-10-13 RX ORDER — CIPROFLOXACIN HYDROCHLORIDE 500 MG/1
500 TABLET, FILM COATED ORAL
Qty: 10 | Refills: 0 | Status: DISCONTINUED | COMMUNITY
Start: 2021-05-10 | End: 2021-10-13

## 2021-10-27 ENCOUNTER — APPOINTMENT (OUTPATIENT)
Dept: CARDIOLOGY | Facility: CLINIC | Age: 86
End: 2021-10-27
Payer: MEDICARE

## 2021-10-27 VITALS
TEMPERATURE: 96.9 F | WEIGHT: 133 LBS | RESPIRATION RATE: 12 BRPM | DIASTOLIC BLOOD PRESSURE: 100 MMHG | HEIGHT: 67 IN | SYSTOLIC BLOOD PRESSURE: 167 MMHG | HEART RATE: 109 BPM | OXYGEN SATURATION: 97 % | BODY MASS INDEX: 20.88 KG/M2

## 2021-10-27 VITALS — DIASTOLIC BLOOD PRESSURE: 70 MMHG | SYSTOLIC BLOOD PRESSURE: 140 MMHG

## 2021-10-27 PROCEDURE — 99214 OFFICE O/P EST MOD 30 MIN: CPT

## 2021-10-27 PROCEDURE — 93000 ELECTROCARDIOGRAM COMPLETE: CPT

## 2021-10-27 NOTE — DISCUSSION/SUMMARY
[___ Month(s)] : in [unfilled] month(s) [FreeTextEntry1] : The patient is an 87-year-old anxious female history of hypertension, hyperlipidemia, hiatal hernia with increase anxiety.\par #1 CV- no angina\par #2 Htn- continue losartan 50mg\par #3 Lipids- increase simvastatin 20mg\par #4 Anxiety- on clonazepam, stress management and discuss with daughter getting help at home. Reassurance provided.

## 2021-10-27 NOTE — HISTORY OF PRESENT ILLNESS
[FreeTextEntry1] : Gia is very upset today. She lives alone, does everything herself and gets very anxious. Has not gone over her labs with anyone yet. No CP, palpitations, dizziness or SOB.

## 2021-11-01 ENCOUNTER — RX RENEWAL (OUTPATIENT)
Age: 86
End: 2021-11-01

## 2021-11-03 LAB
BASOPHILS # BLD AUTO: 0.03 K/UL
BASOPHILS NFR BLD AUTO: 0.5 %
EOSINOPHIL # BLD AUTO: 0.12 K/UL
EOSINOPHIL NFR BLD AUTO: 1.9 %
HCT VFR BLD CALC: 47.3 %
HGB BLD-MCNC: 15.3 G/DL
IMM GRANULOCYTES NFR BLD AUTO: 0.3 %
LYMPHOCYTES # BLD AUTO: 1.31 K/UL
LYMPHOCYTES NFR BLD AUTO: 20.5 %
MAN DIFF?: NORMAL
MCHC RBC-ENTMCNC: 31.4 PG
MCHC RBC-ENTMCNC: 32.3 GM/DL
MCV RBC AUTO: 97.1 FL
MONOCYTES # BLD AUTO: 0.45 K/UL
MONOCYTES NFR BLD AUTO: 7 %
NEUTROPHILS # BLD AUTO: 4.47 K/UL
NEUTROPHILS NFR BLD AUTO: 69.8 %
PLATELET # BLD AUTO: 215 K/UL
RBC # BLD: 4.87 M/UL
RBC # FLD: 13.3 %
WBC # FLD AUTO: 6.4 K/UL

## 2021-12-06 ENCOUNTER — APPOINTMENT (OUTPATIENT)
Dept: UROLOGY | Facility: CLINIC | Age: 86
End: 2021-12-06

## 2021-12-07 ENCOUNTER — APPOINTMENT (OUTPATIENT)
Dept: INTERNAL MEDICINE | Facility: CLINIC | Age: 86
End: 2021-12-07
Payer: MEDICARE

## 2021-12-07 VITALS
BODY MASS INDEX: 21.82 KG/M2 | RESPIRATION RATE: 12 BRPM | DIASTOLIC BLOOD PRESSURE: 98 MMHG | TEMPERATURE: 96.3 F | SYSTOLIC BLOOD PRESSURE: 171 MMHG | OXYGEN SATURATION: 96 % | HEIGHT: 67 IN | HEART RATE: 88 BPM | WEIGHT: 139 LBS

## 2021-12-07 VITALS — SYSTOLIC BLOOD PRESSURE: 150 MMHG | DIASTOLIC BLOOD PRESSURE: 82 MMHG

## 2021-12-07 DIAGNOSIS — M71.341: ICD-10-CM

## 2021-12-07 PROCEDURE — 99214 OFFICE O/P EST MOD 30 MIN: CPT

## 2021-12-07 RX ORDER — MOMETASONE FUROATE 1 MG/G
0.1 CREAM TOPICAL TWICE DAILY
Qty: 1 | Refills: 1 | Status: DISCONTINUED | COMMUNITY
Start: 2020-08-25 | End: 2021-12-07

## 2021-12-07 NOTE — PLAN
[FreeTextEntry1] : Follow- up\par \par 1. Dysuria\par UA and UCX\par see plan \par \par 2. HTN/HLD\par Low sodium, low cholesterol diet/ increased physical activity\par cont Losartan 50 mg daily\par cont Metoprolol 25 mg daily\par cont Simvastatin 20 mg daily\par Follows with Dr. Kemp next appt 12/29\par \par 3. Right hand cyst/  Rt carpal tunnel \par Hand specialist referral \par \par Visiting nurse referral \par \par Shingrix vaccine sent to [facility]

## 2021-12-07 NOTE — PHYSICAL EXAM
[No Acute Distress] : no acute distress [Well Nourished] : well nourished [Normal Sclera/Conjunctiva] : normal sclera/conjunctiva [EOMI] : extraocular movements intact [Normal Outer Ear/Nose] : the outer ears and nose were normal in appearance [Normal Oropharynx] : the oropharynx was normal [No JVD] : no jugular venous distention [No Lymphadenopathy] : no lymphadenopathy [Supple] : supple [No Respiratory Distress] : no respiratory distress  [No Accessory Muscle Use] : no accessory muscle use [Clear to Auscultation] : lungs were clear to auscultation bilaterally [Normal Rate] : normal rate  [Regular Rhythm] : with a regular rhythm [Normal S1, S2] : normal S1 and S2 [No Murmur] : no murmur heard [No Carotid Bruits] : no carotid bruits [Pedal Pulses Present] : the pedal pulses are present [No Edema] : there was no peripheral edema [No Extremity Clubbing/Cyanosis] : no extremity clubbing/cyanosis [Soft] : abdomen soft [Non Tender] : non-tender [Non-distended] : non-distended [No Masses] : no abdominal mass palpated [Normal Bowel Sounds] : normal bowel sounds [Normal Posterior Cervical Nodes] : no posterior cervical lymphadenopathy [Normal Anterior Cervical Nodes] : no anterior cervical lymphadenopathy [No CVA Tenderness] : no CVA  tenderness [No Spinal Tenderness] : no spinal tenderness [No Joint Swelling] : no joint swelling [Grossly Normal Strength/Tone] : grossly normal strength/tone [No Rash] : no rash [Coordination Grossly Intact] : coordination grossly intact [No Focal Deficits] : no focal deficits [Normal Gait] : normal gait [Normal Affect] : the affect was normal [Normal Insight/Judgement] : insight and judgment were intact [de-identified] : right hand cyst/ + Rt carpal tunnel

## 2021-12-07 NOTE — HISTORY OF PRESENT ILLNESS
[de-identified] : 87 year old female with history of HTN, HLD, presents with dysuria\par \par She has been having itching and pain during urination for the last 3-4 days. Denies vaginal discharge, fevers, chills, N/V, blood in urine ,  + chronic constipation \par \par Denies CP, SOB \par Pt c/o Rt hand pain / + numbness in her fingers, + cyst Rt palmar hand \par Pt lives alone and needs help with ADL

## 2021-12-07 NOTE — REVIEW OF SYSTEMS
[Constipation] : constipation [Dysuria] : dysuria [Negative] : Heme/Lymph [FreeTextEntry8] : itching with urination

## 2021-12-10 LAB
APPEARANCE: ABNORMAL
BACTERIA: ABNORMAL
BILIRUBIN URINE: NEGATIVE
BLOOD URINE: ABNORMAL
COLOR: COLORLESS
GLUCOSE QUALITATIVE U: NEGATIVE
HYALINE CASTS: 0 /LPF
KETONES URINE: NEGATIVE
LEUKOCYTE ESTERASE URINE: ABNORMAL
MICROSCOPIC-UA: NORMAL
NITRITE URINE: NEGATIVE
PH URINE: 6.5
PROTEIN URINE: NEGATIVE
RED BLOOD CELLS URINE: 12 /HPF
SPECIFIC GRAVITY URINE: 1
SQUAMOUS EPITHELIAL CELLS: 0 /HPF
UROBILINOGEN URINE: NORMAL
WHITE BLOOD CELLS URINE: 59 /HPF

## 2021-12-13 LAB — BACTERIA UR CULT: ABNORMAL

## 2022-01-05 ENCOUNTER — APPOINTMENT (OUTPATIENT)
Dept: INTERNAL MEDICINE | Facility: CLINIC | Age: 87
End: 2022-01-05

## 2022-01-11 ENCOUNTER — APPOINTMENT (OUTPATIENT)
Dept: INTERNAL MEDICINE | Facility: CLINIC | Age: 87
End: 2022-01-11

## 2022-01-11 ENCOUNTER — APPOINTMENT (OUTPATIENT)
Dept: INTERNAL MEDICINE | Facility: CLINIC | Age: 87
End: 2022-01-11
Payer: MEDICARE

## 2022-01-11 VITALS
RESPIRATION RATE: 12 BRPM | TEMPERATURE: 96.6 F | BODY MASS INDEX: 21.66 KG/M2 | WEIGHT: 138 LBS | OXYGEN SATURATION: 97 % | DIASTOLIC BLOOD PRESSURE: 97 MMHG | SYSTOLIC BLOOD PRESSURE: 168 MMHG | HEART RATE: 88 BPM | HEIGHT: 67 IN

## 2022-01-11 VITALS — DIASTOLIC BLOOD PRESSURE: 82 MMHG | SYSTOLIC BLOOD PRESSURE: 160 MMHG

## 2022-01-11 PROCEDURE — 99214 OFFICE O/P EST MOD 30 MIN: CPT

## 2022-01-13 ENCOUNTER — APPOINTMENT (OUTPATIENT)
Dept: CARDIOLOGY | Facility: CLINIC | Age: 87
End: 2022-01-13
Payer: COMMERCIAL

## 2022-01-13 ENCOUNTER — NON-APPOINTMENT (OUTPATIENT)
Age: 87
End: 2022-01-13

## 2022-01-13 VITALS
SYSTOLIC BLOOD PRESSURE: 167 MMHG | HEART RATE: 87 BPM | DIASTOLIC BLOOD PRESSURE: 91 MMHG | BODY MASS INDEX: 21.03 KG/M2 | TEMPERATURE: 97.1 F | HEIGHT: 67 IN | WEIGHT: 134 LBS | OXYGEN SATURATION: 94 % | RESPIRATION RATE: 12 BRPM

## 2022-01-13 VITALS — DIASTOLIC BLOOD PRESSURE: 82 MMHG | SYSTOLIC BLOOD PRESSURE: 140 MMHG

## 2022-01-13 PROCEDURE — 99214 OFFICE O/P EST MOD 30 MIN: CPT

## 2022-01-13 PROCEDURE — 93000 ELECTROCARDIOGRAM COMPLETE: CPT

## 2022-01-15 NOTE — DISCUSSION/SUMMARY
[___ Month(s)] : in [unfilled] month(s) [FreeTextEntry1] : The patient is an 87-year-old anxious female history of hypertension, hyperlipidemia, hiatal hernia with increase anxiety.\par #1 CV- no angina\par #2 Htn- continue losartan 50mg\par #3 Lipids- continue simvastatin 20mg\par #4 Anxiety- on clonazepam, stress management and discuss with daughter getting help at home. Reassurance and emotional support provided. Labs reviewed and do not need to be repeated.

## 2022-01-15 NOTE — HISTORY OF PRESENT ILLNESS
[FreeTextEntry1] : Gia very nervous. Lives alone and a lot of responsibility. Upset she has to take labs over again. No CP, palpitations or SOB.

## 2022-01-16 LAB
ALBUMIN SERPL ELPH-MCNC: 4.4 G/DL
ALP BLD-CCNC: 79 U/L
ALT SERPL-CCNC: 18 U/L
ANION GAP SERPL CALC-SCNC: 13 MMOL/L
APPEARANCE: CLEAR
AST SERPL-CCNC: 26 U/L
BACTERIA UR CULT: NORMAL
BACTERIA: NEGATIVE
BILIRUB SERPL-MCNC: 0.4 MG/DL
BILIRUBIN URINE: NEGATIVE
BLOOD URINE: NORMAL
BUN SERPL-MCNC: 22 MG/DL
CALCIUM SERPL-MCNC: 9.9 MG/DL
CHLORIDE SERPL-SCNC: 99 MMOL/L
CO2 SERPL-SCNC: 29 MMOL/L
COLOR: COLORLESS
CREAT SERPL-MCNC: 0.99 MG/DL
GLUCOSE QUALITATIVE U: NEGATIVE
GLUCOSE SERPL-MCNC: 79 MG/DL
HYALINE CASTS: 0 /LPF
KETONES URINE: NEGATIVE
LEUKOCYTE ESTERASE URINE: NEGATIVE
MICROSCOPIC-UA: NORMAL
NITRITE URINE: NEGATIVE
PH URINE: 7
POTASSIUM SERPL-SCNC: 4.6 MMOL/L
PROT SERPL-MCNC: 7.1 G/DL
PROTEIN URINE: NEGATIVE
RED BLOOD CELLS URINE: 2 /HPF
SODIUM SERPL-SCNC: 140 MMOL/L
SPECIFIC GRAVITY URINE: 1
SQUAMOUS EPITHELIAL CELLS: 0 /HPF
UROBILINOGEN URINE: NORMAL
WHITE BLOOD CELLS URINE: 0 /HPF

## 2022-01-16 NOTE — PHYSICAL EXAM
[No Acute Distress] : no acute distress [Well Nourished] : well nourished [Normal Sclera/Conjunctiva] : normal sclera/conjunctiva [EOMI] : extraocular movements intact [Normal Outer Ear/Nose] : the outer ears and nose were normal in appearance [Normal Oropharynx] : the oropharynx was normal [No JVD] : no jugular venous distention [No Lymphadenopathy] : no lymphadenopathy [Supple] : supple [Thyroid Normal, No Nodules] : the thyroid was normal and there were no nodules present [No Respiratory Distress] : no respiratory distress  [No Accessory Muscle Use] : no accessory muscle use [Clear to Auscultation] : lungs were clear to auscultation bilaterally [Normal Rate] : normal rate  [Regular Rhythm] : with a regular rhythm [Normal S1, S2] : normal S1 and S2 [No Murmur] : no murmur heard [No Abdominal Bruit] : a ~M bruit was not heard ~T in the abdomen [Pedal Pulses Present] : the pedal pulses are present [No Edema] : there was no peripheral edema [No Palpable Aorta] : no palpable aorta [No Extremity Clubbing/Cyanosis] : no extremity clubbing/cyanosis [Soft] : abdomen soft [Non Tender] : non-tender [Non-distended] : non-distended [Normal Bowel Sounds] : normal bowel sounds [Normal Posterior Cervical Nodes] : no posterior cervical lymphadenopathy [Normal Anterior Cervical Nodes] : no anterior cervical lymphadenopathy [No CVA Tenderness] : no CVA  tenderness [No Spinal Tenderness] : no spinal tenderness [No Joint Swelling] : no joint swelling [Grossly Normal Strength/Tone] : grossly normal strength/tone [No Rash] : no rash [Coordination Grossly Intact] : coordination grossly intact [No Focal Deficits] : no focal deficits [Normal Gait] : normal gait [Normal Affect] : the affect was normal [Normal Insight/Judgement] : insight and judgment were intact [de-identified] : + varicose/spider veins

## 2022-01-16 NOTE — ASSESSMENT
[FreeTextEntry1] : Follow-up\par \par Dysuria \par repeat UA and UCX \par \par HTN\par cont Metoprolol 25 mg daily \par cont Losartan 50 mg daily \par Follows with cardiology, Dr. Munoz has appt 1/13/2022\par \par HLD\par cont Simvastatin 20 mg daily \par discussed importance of following a low cholesterol/low fat diet\par we'll check Lipid panel and LFT's today\par \par Anxiety/depression \par cont Clonazepam 1 mg PRN \par \par health maintenance\par referral for mammogram, US breast and for Bone denisty scan \par \par blood work ordered\par \par follow up in one week for lab results

## 2022-01-16 NOTE — HISTORY OF PRESENT ILLNESS
[de-identified] : 87 year old female with history of HTN, HLD presents for follow-up \par \par She feels well. She reports recently was treated for UTI in December. \par Says she felt better but has again started having occasional discomfort with urination \par \par Denies CP, SOB, HA, N/V or abdominal pain

## 2022-01-20 ENCOUNTER — RX RENEWAL (OUTPATIENT)
Age: 87
End: 2022-01-20

## 2022-01-20 LAB
CHOLEST SERPL-MCNC: 232 MG/DL
HDLC SERPL-MCNC: 79 MG/DL
LDLC SERPL CALC-MCNC: 126 MG/DL
NONHDLC SERPL-MCNC: 154 MG/DL
TRIGL SERPL-MCNC: 138 MG/DL

## 2022-02-16 ENCOUNTER — APPOINTMENT (OUTPATIENT)
Dept: INTERNAL MEDICINE | Facility: CLINIC | Age: 87
End: 2022-02-16
Payer: MEDICARE

## 2022-02-16 VITALS
BODY MASS INDEX: 21.5 KG/M2 | OXYGEN SATURATION: 99 % | WEIGHT: 137 LBS | HEART RATE: 70 BPM | HEIGHT: 67 IN | SYSTOLIC BLOOD PRESSURE: 174 MMHG | TEMPERATURE: 97.3 F | RESPIRATION RATE: 12 BRPM | DIASTOLIC BLOOD PRESSURE: 96 MMHG

## 2022-02-16 VITALS — SYSTOLIC BLOOD PRESSURE: 160 MMHG | DIASTOLIC BLOOD PRESSURE: 84 MMHG

## 2022-02-16 PROCEDURE — 99214 OFFICE O/P EST MOD 30 MIN: CPT

## 2022-02-22 NOTE — REVIEW OF SYSTEMS
[Constipation] : constipation [Negative] : Heme/Lymph [FreeTextEntry5] : occasional palpitations r/t anxiety, see hpi, numbness to b/l LE-intermittent x 3-4 months, see hpi [FreeTextEntry7] : chronic constipation-see hpi

## 2022-02-22 NOTE — PHYSICAL EXAM
[No Acute Distress] : no acute distress [Well Nourished] : well nourished [Normal Sclera/Conjunctiva] : normal sclera/conjunctiva [EOMI] : extraocular movements intact [Normal Outer Ear/Nose] : the outer ears and nose were normal in appearance [Normal Oropharynx] : the oropharynx was normal [No JVD] : no jugular venous distention [No Lymphadenopathy] : no lymphadenopathy [Supple] : supple [No Respiratory Distress] : no respiratory distress  [No Accessory Muscle Use] : no accessory muscle use [Clear to Auscultation] : lungs were clear to auscultation bilaterally [Normal Rate] : normal rate  [Regular Rhythm] : with a regular rhythm [Normal S1, S2] : normal S1 and S2 [No Murmur] : no murmur heard [Pedal Pulses Present] : the pedal pulses are present [No Edema] : there was no peripheral edema [No Extremity Clubbing/Cyanosis] : no extremity clubbing/cyanosis [Soft] : abdomen soft [Non-distended] : non-distended [Normal Bowel Sounds] : normal bowel sounds [Normal Posterior Cervical Nodes] : no posterior cervical lymphadenopathy [Normal Anterior Cervical Nodes] : no anterior cervical lymphadenopathy [No CVA Tenderness] : no CVA  tenderness [No Spinal Tenderness] : no spinal tenderness [No Joint Swelling] : no joint swelling [No Rash] : no rash [Coordination Grossly Intact] : coordination grossly intact [No Focal Deficits] : no focal deficits [Normal Gait] : normal gait [Speech Grossly Normal] : speech grossly normal [Normal Affect] : the affect was normal [Alert and Oriented x3] : oriented to person, place, and time [Normal Insight/Judgement] : insight and judgment were intact [de-identified] : +sensation- equal to B/L LE

## 2022-02-22 NOTE — ASSESSMENT
[FreeTextEntry1] : \par \par \par HTN- pt reports elevated BP this morning at home-likely due to pt getting nervous\par cont Metoprolol 25 mg daily \par cont Losartan 50 mg daily \par Follows with cardiology- f/u as scheduled\par \par Anxiety/depression \par cont Clonazepam 1 mg PRN \par f/u with psychiatry\par \par HLD\par cont Simvastatin 20 mg daily \par discussed importance of following a low cholesterol/low fat diet\par we'll check Lipid panel and LFT's today\par \par \par health maintenance\par referral for mammogram, US breast and for Bone denisty scan \par

## 2022-02-22 NOTE — HISTORY OF PRESENT ILLNESS
[Family Member] : family member [de-identified] : 87 year old female with history of HTN, HLD, anxiety, accompanied by her sister, presents for follow-up. She states she woke up this morning had a headache and was feeling dizzy. Says when she feels this way her BP is usually high. She checked her BP and it was 189/91 and the second time 154/83.  She says she has anxiety and gets nervous. She takes the medicine for anxiety and that's what makes her BP better. \par Pt and sister state that pt saw something on tv that said that if you have low circulation you can get a heart attack That made her very nervous\par Also says she has been having "numbness in my legs on both sides", states the numbness is from the upper thigh down the entirety of both legs and is worst at night. She states she has been experiencing the numbness for 3-4 months. She states she tries to exercise to help the numbness, is compliant with her medications and is following a low salt/low cholesterol/low fat diet -most of the time. States VNS and PT/OT come to her home monthly or more (pt unsure). \par She follows with psychiatry every 3 months. \par She reports occasional palpitations due to her anxiety, she takes clonazepam PRN with good relief reported. \par +Constipation- chronic, states OTC remedies do not help\par \par Denies CP, SOB, HA, N/V or abdominal pain

## 2022-04-13 ENCOUNTER — NON-APPOINTMENT (OUTPATIENT)
Age: 87
End: 2022-04-13

## 2022-04-13 ENCOUNTER — APPOINTMENT (OUTPATIENT)
Dept: CARDIOLOGY | Facility: CLINIC | Age: 87
End: 2022-04-13
Payer: MEDICARE

## 2022-04-13 VITALS
TEMPERATURE: 97.1 F | BODY MASS INDEX: 21.3 KG/M2 | RESPIRATION RATE: 12 BRPM | HEART RATE: 88 BPM | WEIGHT: 136 LBS | SYSTOLIC BLOOD PRESSURE: 175 MMHG | DIASTOLIC BLOOD PRESSURE: 104 MMHG | OXYGEN SATURATION: 98 %

## 2022-04-13 VITALS — SYSTOLIC BLOOD PRESSURE: 140 MMHG | DIASTOLIC BLOOD PRESSURE: 90 MMHG

## 2022-04-13 DIAGNOSIS — R07.9 CHEST PAIN, UNSPECIFIED: ICD-10-CM

## 2022-04-13 DIAGNOSIS — R07.89 OTHER CHEST PAIN: ICD-10-CM

## 2022-04-13 PROCEDURE — 93000 ELECTROCARDIOGRAM COMPLETE: CPT

## 2022-04-13 PROCEDURE — 99213 OFFICE O/P EST LOW 20 MIN: CPT

## 2022-04-13 NOTE — DISCUSSION/SUMMARY
[FreeTextEntry1] : The patient is an 87-year-old anxious female history of hypertension, hyperlipidemia, hiatal hernia with increase anxiety.\par #1 CV- no angina\par #2 Htn- continue losartan 50mg and metoprolol\par #3 Lipids- continue simvastatin 20mg\par #4 Anxiety- on clonazepam, stress management and discuss with daughter getting help at home. Reassurance and emotional support provided.

## 2022-04-13 NOTE — HISTORY OF PRESENT ILLNESS
[FreeTextEntry1] : Gia is very anxious as usual. Right hand pain and trigger finger. GOing to surgeon tomorrow. She gets body pinching on occasion. Headache every day. Constipation. When nervous BP high. She worries about loss  36 years ago and daughter 24 years ago.

## 2022-04-22 ENCOUNTER — APPOINTMENT (OUTPATIENT)
Dept: CT IMAGING | Facility: IMAGING CENTER | Age: 87
End: 2022-04-22

## 2022-07-14 ENCOUNTER — APPOINTMENT (OUTPATIENT)
Dept: INTERNAL MEDICINE | Facility: CLINIC | Age: 87
End: 2022-07-14

## 2022-07-14 VITALS
DIASTOLIC BLOOD PRESSURE: 88 MMHG | OXYGEN SATURATION: 97 % | WEIGHT: 134 LBS | BODY MASS INDEX: 21.03 KG/M2 | SYSTOLIC BLOOD PRESSURE: 159 MMHG | HEART RATE: 85 BPM | HEIGHT: 67 IN | RESPIRATION RATE: 12 BRPM | TEMPERATURE: 97.3 F

## 2022-07-14 DIAGNOSIS — R53.83 OTHER FATIGUE: ICD-10-CM

## 2022-07-14 PROCEDURE — 99214 OFFICE O/P EST MOD 30 MIN: CPT

## 2022-07-14 RX ORDER — TERBINAFINE HYDROCHLORIDE 1 G/100G
1 CREAM TOPICAL 3 TIMES DAILY
Qty: 1 | Refills: 0 | Status: DISCONTINUED | COMMUNITY
Start: 2019-06-03 | End: 2022-07-14

## 2022-07-14 RX ORDER — CEPHALEXIN 500 MG/1
500 TABLET ORAL
Qty: 10 | Refills: 0 | Status: DISCONTINUED | COMMUNITY
Start: 2021-12-07 | End: 2022-07-14

## 2022-07-14 RX ORDER — SULFAMETHOXAZOLE AND TRIMETHOPRIM 800; 160 MG/1; MG/1
800-160 TABLET ORAL
Qty: 10 | Refills: 0 | Status: DISCONTINUED | COMMUNITY
Start: 2021-12-09 | End: 2022-07-14

## 2022-07-14 NOTE — PHYSICAL EXAM
[No Acute Distress] : no acute distress [Well Nourished] : well nourished [Normal Sclera/Conjunctiva] : normal sclera/conjunctiva [EOMI] : extraocular movements intact [Normal Outer Ear/Nose] : the outer ears and nose were normal in appearance [Normal Oropharynx] : the oropharynx was normal [No JVD] : no jugular venous distention [No Lymphadenopathy] : no lymphadenopathy [Supple] : supple [No Respiratory Distress] : no respiratory distress  [No Accessory Muscle Use] : no accessory muscle use [Clear to Auscultation] : lungs were clear to auscultation bilaterally [Normal Rate] : normal rate  [Regular Rhythm] : with a regular rhythm [Normal S1, S2] : normal S1 and S2 [No Murmur] : no murmur heard [No Carotid Bruits] : no carotid bruits [Pedal Pulses Present] : the pedal pulses are present [No Extremity Clubbing/Cyanosis] : no extremity clubbing/cyanosis [Soft] : abdomen soft [Non Tender] : non-tender [Non-distended] : non-distended [No Masses] : no abdominal mass palpated [No HSM] : no HSM [Normal Bowel Sounds] : normal bowel sounds [Normal Posterior Cervical Nodes] : no posterior cervical lymphadenopathy [Normal Anterior Cervical Nodes] : no anterior cervical lymphadenopathy [No CVA Tenderness] : no CVA  tenderness [No Spinal Tenderness] : no spinal tenderness [No Joint Swelling] : no joint swelling [Grossly Normal Strength/Tone] : grossly normal strength/tone [No Rash] : no rash [Coordination Grossly Intact] : coordination grossly intact [No Focal Deficits] : no focal deficits [Normal Gait] : normal gait [Normal Affect] : the affect was normal [Normal Insight/Judgement] : insight and judgment were intact [de-identified] : Dependent edema  [de-identified] : right hand cyst

## 2022-07-14 NOTE — ASSESSMENT
[FreeTextEntry1] : \par Fatigue\par blood work ordered\par \par Anxiety \par cont Clonazepam 1 mg twice daily PRN \par follows with psychiatry monthly\par \par Constipation \par Increase Po fluid and fiber intake \par cont Miralax prn\par cont Famotidine 20mg prn-renewed today\par \par HTN\par cont Losartan 50 mg daily \par cont Metoprolol 25 mg daily \par cont low salt diet\par \par HLD\par cont Simvastatin 20 mg daily \par cont low chol/low fat diet\par we'll check lipids and LFT's today\par \par \par pt to follow-up in 1 week for results

## 2022-07-14 NOTE — HISTORY OF PRESENT ILLNESS
[de-identified] : Patient is a 88 year old female with history of HTN, HLD, anxiety, presents with complaints of feeling tired for a while now.\par Says she does not sleep well at night. Says she "thinks too much"\par Patient reports increased fatigue and constipation for the last 4-5 days. Last bowel movement was yesterday after taking milk of magnesia. She endorses occasional nausea  and decreased appetite. Denies any fevers, chills, dark tarry stools\par \par Denies fever, chills, CP, SOB,  abdominal pain, HA

## 2022-07-25 ENCOUNTER — RX RENEWAL (OUTPATIENT)
Age: 87
End: 2022-07-25

## 2022-07-25 LAB
ALBUMIN SERPL ELPH-MCNC: 3.9 G/DL
ALP BLD-CCNC: 65 U/L
ALT SERPL-CCNC: 15 U/L
ANION GAP SERPL CALC-SCNC: 17 MMOL/L
AST SERPL-CCNC: 27 U/L
BILIRUB SERPL-MCNC: 0.4 MG/DL
BUN SERPL-MCNC: 19 MG/DL
CALCIUM SERPL-MCNC: 9.2 MG/DL
CHLORIDE SERPL-SCNC: 99 MMOL/L
CHOLEST SERPL-MCNC: 187 MG/DL
CO2 SERPL-SCNC: 21 MMOL/L
CREAT SERPL-MCNC: 1.09 MG/DL
EGFR: 49 ML/MIN/1.73M2
GLUCOSE SERPL-MCNC: 85 MG/DL
HDLC SERPL-MCNC: 63 MG/DL
LDLC SERPL CALC-MCNC: 106 MG/DL
NONHDLC SERPL-MCNC: 124 MG/DL
POTASSIUM SERPL-SCNC: 4.1 MMOL/L
PROT SERPL-MCNC: 7.3 G/DL
SODIUM SERPL-SCNC: 138 MMOL/L
TRIGL SERPL-MCNC: 89 MG/DL
TSH SERPL-ACNC: 3.05 UIU/ML

## 2022-07-27 LAB
BASOPHILS # BLD AUTO: 0.04 K/UL
BASOPHILS NFR BLD AUTO: 0.7 %
EOSINOPHIL # BLD AUTO: 0.15 K/UL
EOSINOPHIL NFR BLD AUTO: 2.8 %
HCT VFR BLD CALC: 46.5 %
HGB BLD-MCNC: 14.5 G/DL
IMM GRANULOCYTES NFR BLD AUTO: 0.6 %
LYMPHOCYTES # BLD AUTO: 1.02 K/UL
LYMPHOCYTES NFR BLD AUTO: 18.9 %
MAN DIFF?: NORMAL
MCHC RBC-ENTMCNC: 31 PG
MCHC RBC-ENTMCNC: 31.2 GM/DL
MCV RBC AUTO: 99.4 FL
MONOCYTES # BLD AUTO: 0.57 K/UL
MONOCYTES NFR BLD AUTO: 10.6 %
NEUTROPHILS # BLD AUTO: 3.58 K/UL
NEUTROPHILS NFR BLD AUTO: 66.4 %
PLATELET # BLD AUTO: 218 K/UL
RBC # BLD: 4.68 M/UL
RBC # FLD: 13.6 %
WBC # FLD AUTO: 5.39 K/UL

## 2022-08-17 ENCOUNTER — NON-APPOINTMENT (OUTPATIENT)
Age: 87
End: 2022-08-17

## 2022-08-17 ENCOUNTER — APPOINTMENT (OUTPATIENT)
Dept: CARDIOLOGY | Facility: CLINIC | Age: 87
End: 2022-08-17

## 2022-08-17 VITALS
RESPIRATION RATE: 12 BRPM | WEIGHT: 135 LBS | DIASTOLIC BLOOD PRESSURE: 98 MMHG | SYSTOLIC BLOOD PRESSURE: 177 MMHG | OXYGEN SATURATION: 97 % | HEART RATE: 88 BPM | BODY MASS INDEX: 21.14 KG/M2 | TEMPERATURE: 96.7 F

## 2022-08-17 VITALS — SYSTOLIC BLOOD PRESSURE: 152 MMHG | DIASTOLIC BLOOD PRESSURE: 88 MMHG

## 2022-08-17 DIAGNOSIS — R00.2 PALPITATIONS: ICD-10-CM

## 2022-08-17 PROCEDURE — 93000 ELECTROCARDIOGRAM COMPLETE: CPT

## 2022-08-17 PROCEDURE — 99214 OFFICE O/P EST MOD 30 MIN: CPT | Mod: 25

## 2022-08-17 NOTE — DISCUSSION/SUMMARY
[FreeTextEntry1] : The patient is an 88-year-old anxious female history of hypertension, hyperlipidemia, hiatal hernia with recent dx PAD\par #1 CV- no angina. will schedule echocardiogram given atypical chest pain and dyspnea\par #2 Htn- bp well controlled. continue losartan 50mg and metoprolol\par #3 Lipids- continue simvastatin 20mg\par #4 Anxiety- on clonazepam and lexapro, stress management and discuss with daughter getting help at home. Reassurance and emotional support provided.  \par #5 peripheral arterial disease - follow up with vascular cardiology Dr. Tinoco\par #6 health care maintenance - shingles vaccine sent to pharmacy [EKG obtained to assist in diagnosis and management of assessed problem(s)] : EKG obtained to assist in diagnosis and management of assessed problem(s)

## 2022-08-17 NOTE — HISTORY OF PRESENT ILLNESS
[FreeTextEntry1] : \ernesto Nielsen is here for a follow up visit today. She is very anxious as usual. When nervous BP high. She worries about loss  36 years ago and daughter 24 years ago. She has been monitoring her blood pressure at home, and it has been very well controlled, ranging from 118-129/65-71.  She had a PAD done at home with her insurance, which showed 0.59 in the right leg and 0.2 in the left leg. She denies any leg claudication. She does have knee pain, worse on the right. She has frequent headaches. She can be off-balance when she walks around. She has chest pain with walking and shortness of breath, which is improving.

## 2022-08-17 NOTE — REVIEW OF SYSTEMS
[Headache] : headache [Negative] : Heme/Lymph [Dyspnea on exertion] : dyspnea during exertion [Chest Discomfort] : chest discomfort

## 2022-08-17 NOTE — PHYSICAL EXAM
[Well Developed] : well developed [Well Nourished] : well nourished [No Acute Distress] : no acute distress [Normal Conjunctiva] : normal conjunctiva [Normal S1, S2] : normal S1, S2 [No Murmur] : no murmur [No Rub] : no rub [No Gallop] : no gallop [Clear Lung Fields] : clear lung fields [Good Air Entry] : good air entry [No Respiratory Distress] : no respiratory distress  [Soft] : abdomen soft [Non Tender] : non-tender [No Masses/organomegaly] : no masses/organomegaly [Normal Gait] : normal gait [No Edema] : no edema [No Cyanosis] : no cyanosis [No Clubbing] : no clubbing [No Varicosities] : no varicosities [No Rash] : no rash [No Skin Lesions] : no skin lesions [Moves all extremities] : moves all extremities [No Focal Deficits] : no focal deficits [Normal Speech] : normal speech [Alert and Oriented] : alert and oriented [Normal memory] : normal memory

## 2022-10-17 ENCOUNTER — MED ADMIN CHARGE (OUTPATIENT)
Age: 87
End: 2022-10-17

## 2022-10-17 ENCOUNTER — APPOINTMENT (OUTPATIENT)
Dept: INTERNAL MEDICINE | Facility: CLINIC | Age: 87
End: 2022-10-17

## 2022-10-17 ENCOUNTER — LABORATORY RESULT (OUTPATIENT)
Age: 87
End: 2022-10-17

## 2022-10-17 VITALS
DIASTOLIC BLOOD PRESSURE: 108 MMHG | HEART RATE: 98 BPM | OXYGEN SATURATION: 95 % | HEIGHT: 67 IN | SYSTOLIC BLOOD PRESSURE: 180 MMHG | TEMPERATURE: 97.1 F | WEIGHT: 131 LBS | RESPIRATION RATE: 12 BRPM | BODY MASS INDEX: 20.56 KG/M2

## 2022-10-17 VITALS — DIASTOLIC BLOOD PRESSURE: 86 MMHG | SYSTOLIC BLOOD PRESSURE: 152 MMHG

## 2022-10-17 DIAGNOSIS — Z00.00 ENCOUNTER FOR GENERAL ADULT MEDICAL EXAMINATION W/OUT ABNORMAL FINDINGS: ICD-10-CM

## 2022-10-17 DIAGNOSIS — K21.9 GASTRO-ESOPHAGEAL REFLUX DISEASE W/OUT ESOPHAGITIS: ICD-10-CM

## 2022-10-17 PROCEDURE — 90471 IMMUNIZATION ADMIN: CPT

## 2022-10-17 PROCEDURE — 99397 PER PM REEVAL EST PAT 65+ YR: CPT

## 2022-10-17 PROCEDURE — 90662 IIV NO PRSV INCREASED AG IM: CPT

## 2022-11-02 LAB
25(OH)D3 SERPL-MCNC: 55.8 NG/ML
ALBUMIN SERPL ELPH-MCNC: 4.5 G/DL
ALP BLD-CCNC: 65 U/L
ALT SERPL-CCNC: 25 U/L
ANION GAP SERPL CALC-SCNC: 15 MMOL/L
AST SERPL-CCNC: 36 U/L
BASOPHILS # BLD AUTO: 0.06 K/UL
BASOPHILS NFR BLD AUTO: 0.8 %
BILIRUB SERPL-MCNC: 0.6 MG/DL
BUN SERPL-MCNC: 18 MG/DL
CALCIUM SERPL-MCNC: 9.8 MG/DL
CHLORIDE SERPL-SCNC: 99 MMOL/L
CHOLEST SERPL-MCNC: 220 MG/DL
CO2 SERPL-SCNC: 25 MMOL/L
COVID-19 NUCLEOCAPSID  GAM ANTIBODY INTERPRETATION: NEGATIVE
COVID-19 SPIKE DOMAIN ANTIBODY INTERPRETATION: POSITIVE
CREAT SERPL-MCNC: 1.06 MG/DL
EGFR: 51 ML/MIN/1.73M2
EOSINOPHIL # BLD AUTO: 0.13 K/UL
EOSINOPHIL NFR BLD AUTO: 1.8 %
ESTIMATED AVERAGE GLUCOSE: 120 MG/DL
GLUCOSE SERPL-MCNC: 89 MG/DL
HBA1C MFR BLD HPLC: 5.8 %
HCT VFR BLD CALC: 48 %
HDLC SERPL-MCNC: 80 MG/DL
HGB BLD-MCNC: 15.5 G/DL
IMM GRANULOCYTES NFR BLD AUTO: 0.4 %
LDLC SERPL CALC-MCNC: 121 MG/DL
LYMPHOCYTES # BLD AUTO: 1.54 K/UL
LYMPHOCYTES NFR BLD AUTO: 20.9 %
MAN DIFF?: NORMAL
MCHC RBC-ENTMCNC: 31.2 PG
MCHC RBC-ENTMCNC: 32.3 GM/DL
MCV RBC AUTO: 96.6 FL
MONOCYTES # BLD AUTO: 0.6 K/UL
MONOCYTES NFR BLD AUTO: 8.1 %
NEUTROPHILS # BLD AUTO: 5.02 K/UL
NEUTROPHILS NFR BLD AUTO: 68 %
NONHDLC SERPL-MCNC: 140 MG/DL
PLATELET # BLD AUTO: 259 K/UL
POTASSIUM SERPL-SCNC: 4 MMOL/L
PROT SERPL-MCNC: 7.9 G/DL
RBC # BLD: 4.97 M/UL
RBC # FLD: 14.6 %
SARS-COV-2 AB SERPL IA-ACNC: >250 U/ML
SARS-COV-2 AB SERPL QL IA: 0.07 INDEX
SODIUM SERPL-SCNC: 139 MMOL/L
TRIGL SERPL-MCNC: 96 MG/DL
TSH SERPL-ACNC: 8.93 UIU/ML
VIT B12 SERPL-MCNC: >2000 PG/ML
WBC # FLD AUTO: 7.38 K/UL

## 2022-11-02 NOTE — ASSESSMENT
[FreeTextEntry1] : Physical\par \par Flu vaccine today \par \par UTD with screenings \par \par HTN/HLD\par Low sodium, low cholesterol diet/ increased physical activity \par cont Losartan 50 mg daily \par cont Metoprolol 25 mg daily \par cont Simvastatin 20 mg daily \par \par Chronic constipation/abdominal bloating \par increase fiber in diet \par increase daily water intake\par cont famotidine 20mg every other day\par Miralax\par GI referral \par \par Labs ordered pt to follow-up in 1 week for results

## 2022-11-02 NOTE — PHYSICAL EXAM
[No Acute Distress] : no acute distress [Well Nourished] : well nourished [Normal Sclera/Conjunctiva] : normal sclera/conjunctiva [EOMI] : extraocular movements intact [Normal Outer Ear/Nose] : the outer ears and nose were normal in appearance [Normal Oropharynx] : the oropharynx was normal [No JVD] : no jugular venous distention [No Lymphadenopathy] : no lymphadenopathy [Supple] : supple [No Respiratory Distress] : no respiratory distress  [No Accessory Muscle Use] : no accessory muscle use [Clear to Auscultation] : lungs were clear to auscultation bilaterally [Normal Rate] : normal rate  [Regular Rhythm] : with a regular rhythm [Normal S1, S2] : normal S1 and S2 [No Murmur] : no murmur heard [No Carotid Bruits] : no carotid bruits [Pedal Pulses Present] : the pedal pulses are present [No Extremity Clubbing/Cyanosis] : no extremity clubbing/cyanosis [Soft] : abdomen soft [Non Tender] : non-tender [Non-distended] : non-distended [No Masses] : no abdominal mass palpated [No HSM] : no HSM [Normal Bowel Sounds] : normal bowel sounds [Normal Posterior Cervical Nodes] : no posterior cervical lymphadenopathy [Normal Anterior Cervical Nodes] : no anterior cervical lymphadenopathy [No CVA Tenderness] : no CVA  tenderness [No Spinal Tenderness] : no spinal tenderness [Kyphosis] : kyphosis [No Joint Swelling] : no joint swelling [Grossly Normal Strength/Tone] : grossly normal strength/tone [No Rash] : no rash [Coordination Grossly Intact] : coordination grossly intact [No Focal Deficits] : no focal deficits [Normal Gait] : normal gait [Normal Affect] : the affect was normal [Normal Insight/Judgement] : insight and judgment were intact [de-identified] : Dependent edema  [de-identified] : right hand cyst

## 2022-11-02 NOTE — HEALTH RISK ASSESSMENT
[Good] : ~his/her~  mood as  good [Never] : Never [No] : No [No falls in past year] : Patient reported no falls in the past year [0] : 2) Feeling down, depressed, or hopeless: Not at all (0) [Patient reported mammogram was normal] : Patient reported mammogram was normal [Patient reported colonoscopy was normal] : Patient reported colonoscopy was normal [None] : None [Alone] : lives alone [Retired] : retired [] :  [# Of Children ___] : has [unfilled] children [Feels Safe at Home] : Feels safe at home [Fully functional (bathing, dressing, toileting, transferring, walking, feeding)] : Fully functional (bathing, dressing, toileting, transferring, walking, feeding) [Fully functional (using the telephone, shopping, preparing meals, housekeeping, doing laundry, using] : Fully functional and needs no help or supervision to perform IADLs (using the telephone, shopping, preparing meals, housekeeping, doing laundry, using transportation, managing medications and managing finances) [Reports normal functional visual acuity (ie: able to read med bottle)] : Reports normal functional visual acuity [Smoke Detector] : smoke detector [Carbon Monoxide Detector] : carbon monoxide detector [Safety elements used in home] : safety elements used in home [Seat Belt] :  uses seat belt [Sunscreen] : uses sunscreen [Reports changes in hearing] : Reports no changes in hearing [Reports changes in vision] : Reports no changes in vision [Reports changes in dental health] : Reports no changes in dental health [Guns at Home] : no guns at home [Travel to Developing Areas] : does not  travel to developing areas [TB Exposure] : is not being exposed to tuberculosis [Caregiver Concerns] : does not have caregiver concerns [MammogramDate] : 4/2022 [BoneDensityDate] : 4/2022 [BoneDensityComments] : osteopenia  [ColonoscopyDate] : 7 years ago

## 2022-11-02 NOTE — HISTORY OF PRESENT ILLNESS
[de-identified] : Patient is a 88 year old female with history of HTN, HLD, anxiety, presents for physical \par \par She reports that she has been feeling more bloated and suffers from constipation at times. She reports having colonoscopy about 6-7 years ago and was unable to tolerate the drink. \par Denies CP, SOB, N/V or abdominal pain

## 2022-11-04 ENCOUNTER — APPOINTMENT (OUTPATIENT)
Dept: CARDIOLOGY | Facility: CLINIC | Age: 87
End: 2022-11-04

## 2022-11-04 PROCEDURE — 93306 TTE W/DOPPLER COMPLETE: CPT

## 2022-11-07 LAB
APPEARANCE: CLEAR
BACTERIA: NEGATIVE
BILIRUBIN URINE: NEGATIVE
BLOOD URINE: NEGATIVE
COLOR: YELLOW
GLUCOSE QUALITATIVE U: NEGATIVE
HYALINE CASTS: 1 /LPF
KETONES URINE: NEGATIVE
LEUKOCYTE ESTERASE URINE: NEGATIVE
MICROSCOPIC-UA: NORMAL
NITRITE URINE: NEGATIVE
PH URINE: 7
PROTEIN URINE: NORMAL
RED BLOOD CELLS URINE: 2 /HPF
SPECIFIC GRAVITY URINE: 1.01
SQUAMOUS EPITHELIAL CELLS: 1 /HPF
UROBILINOGEN URINE: NORMAL
WHITE BLOOD CELLS URINE: 1 /HPF

## 2022-12-05 ENCOUNTER — APPOINTMENT (OUTPATIENT)
Dept: CARDIOLOGY | Facility: CLINIC | Age: 87
End: 2022-12-05

## 2022-12-05 ENCOUNTER — APPOINTMENT (OUTPATIENT)
Dept: INTERNAL MEDICINE | Facility: CLINIC | Age: 87
End: 2022-12-05
Payer: MEDICARE

## 2022-12-05 VITALS
SYSTOLIC BLOOD PRESSURE: 158 MMHG | OXYGEN SATURATION: 95 % | TEMPERATURE: 96.7 F | HEART RATE: 99 BPM | BODY MASS INDEX: 20.99 KG/M2 | DIASTOLIC BLOOD PRESSURE: 94 MMHG | RESPIRATION RATE: 14 BRPM | WEIGHT: 134 LBS

## 2022-12-05 VITALS — SYSTOLIC BLOOD PRESSURE: 150 MMHG | DIASTOLIC BLOOD PRESSURE: 90 MMHG

## 2022-12-05 PROCEDURE — 99213 OFFICE O/P EST LOW 20 MIN: CPT

## 2022-12-05 PROCEDURE — 99214 OFFICE O/P EST MOD 30 MIN: CPT | Mod: 25

## 2022-12-05 PROCEDURE — 93000 ELECTROCARDIOGRAM COMPLETE: CPT

## 2022-12-05 NOTE — REVIEW OF SYSTEMS
[Shortness Of Breath] : shortness of breath [Constipation] : constipation [Dysuria] : dysuria [Negative] : Heme/Lymph

## 2022-12-05 NOTE — HISTORY OF PRESENT ILLNESS
[FreeTextEntry1] : Gia is not feeling well with burning on urination and very SOB on minimal exertion over the last two weeks. No lightheadedness or dizziness.

## 2022-12-05 NOTE — DISCUSSION/SUMMARY
[FreeTextEntry1] : The patient is an 88-year-old anxious female HTN, HLD, HH with UTI and new onset afib\par #1 Afib- start eliquis 2.5mg bid, increase toprol to 50mg, eventmonitor today.\par #2 Htn- bp well controlled. continue losartan 50mg and metoprolol\par #3 Lipids- continue simvastatin 20mg\par #4 Anxiety- on clonazepam and lexapro, stress management and discuss with daughter getting help at home. Reassurance and emotional support provided.  \par #5 PAD- does not want f/u care\par #6 - started on cipro for UTI today

## 2022-12-05 NOTE — PHYSICAL EXAM
[No Acute Distress] : no acute distress [Well Nourished] : well nourished [Normal Sclera/Conjunctiva] : normal sclera/conjunctiva [EOMI] : extraocular movements intact [Normal Outer Ear/Nose] : the outer ears and nose were normal in appearance [Normal Oropharynx] : the oropharynx was normal [No JVD] : no jugular venous distention [No Lymphadenopathy] : no lymphadenopathy [Supple] : supple [No Respiratory Distress] : no respiratory distress  [No Accessory Muscle Use] : no accessory muscle use [Clear to Auscultation] : lungs were clear to auscultation bilaterally [Normal Rate] : normal rate  [Regular Rhythm] : with a regular rhythm [Normal S1, S2] : normal S1 and S2 [No Murmur] : no murmur heard [No Carotid Bruits] : no carotid bruits [Pedal Pulses Present] : the pedal pulses are present [No Edema] : there was no peripheral edema [No Extremity Clubbing/Cyanosis] : no extremity clubbing/cyanosis [Soft] : abdomen soft [Non Tender] : non-tender [Non-distended] : non-distended [No Masses] : no abdominal mass palpated [No HSM] : no HSM [Normal Bowel Sounds] : normal bowel sounds [Normal Posterior Cervical Nodes] : no posterior cervical lymphadenopathy [Normal Anterior Cervical Nodes] : no anterior cervical lymphadenopathy [No CVA Tenderness] : no CVA  tenderness [No Spinal Tenderness] : no spinal tenderness [Kyphosis] : kyphosis [No Joint Swelling] : no joint swelling [Grossly Normal Strength/Tone] : grossly normal strength/tone [No Rash] : no rash [Coordination Grossly Intact] : coordination grossly intact [No Focal Deficits] : no focal deficits [Normal Gait] : normal gait [Normal Affect] : the affect was normal [Normal Insight/Judgement] : insight and judgment were intact

## 2022-12-05 NOTE — REVIEW OF SYSTEMS
[SOB] : shortness of breath [Dyspnea on exertion] : dyspnea during exertion [Chest Discomfort] : no chest discomfort [Lower Ext Edema] : no extremity edema [Palpitations] : no palpitations [Negative] : Heme/Lymph

## 2022-12-14 ENCOUNTER — APPOINTMENT (OUTPATIENT)
Dept: CARDIOLOGY | Facility: CLINIC | Age: 87
End: 2022-12-14

## 2022-12-14 VITALS
SYSTOLIC BLOOD PRESSURE: 174 MMHG | DIASTOLIC BLOOD PRESSURE: 90 MMHG | BODY MASS INDEX: 21.35 KG/M2 | WEIGHT: 136 LBS | HEART RATE: 102 BPM | RESPIRATION RATE: 12 BRPM | HEIGHT: 67 IN | OXYGEN SATURATION: 97 %

## 2022-12-14 PROCEDURE — 99214 OFFICE O/P EST MOD 30 MIN: CPT

## 2022-12-14 RX ORDER — ESCITALOPRAM OXALATE 5 MG/1
5 TABLET ORAL
Refills: 0 | Status: DISCONTINUED | COMMUNITY
Start: 2022-08-17 | End: 2022-12-14

## 2022-12-14 NOTE — DISCUSSION/SUMMARY
[FreeTextEntry1] : The patient is an 88-year-old anxious female HTN, HLD, HH with UTI and new onset afib most likely causing the dyspnea. \par #1 Afib- c/w eliquis 2.5mg bid, decrease toprol to 25mg, start amiodarone load 200mg bid x 14 days then 200mg daily, event monitor pending\par #2 Htn- bp well controlled. continue losartan 50mg and metoprolol\par #3 Lipids- continue simvastatin 20mg\par #4 Anxiety- on clonazepam and may safely d/c citalopram 5mg, stress management and discuss with daughter getting help at home. Reassurance and emotional support provided.  \par #5 PAD- does not want f/u care\par #6 - completed cipro for UTI

## 2022-12-19 ENCOUNTER — APPOINTMENT (OUTPATIENT)
Dept: CARDIOLOGY | Facility: CLINIC | Age: 87
End: 2022-12-19

## 2022-12-19 ENCOUNTER — LABORATORY RESULT (OUTPATIENT)
Age: 87
End: 2022-12-19

## 2023-01-01 LAB
APPEARANCE: CLEAR
BACTERIA UR CULT: ABNORMAL
BACTERIA: NEGATIVE
BILIRUBIN URINE: NEGATIVE
BLOOD URINE: ABNORMAL
COLOR: NORMAL
GLUCOSE QUALITATIVE U: NEGATIVE
HYALINE CASTS: 0 /LPF
KETONES URINE: NEGATIVE
LEUKOCYTE ESTERASE URINE: ABNORMAL
MICROSCOPIC-UA: NORMAL
NITRITE URINE: NEGATIVE
PH URINE: 6.5
PROTEIN URINE: NEGATIVE
RED BLOOD CELLS URINE: 1 /HPF
SPECIFIC GRAVITY URINE: 1
SQUAMOUS EPITHELIAL CELLS: 1 /HPF
UROBILINOGEN URINE: NORMAL
WHITE BLOOD CELLS URINE: 14 /HPF

## 2023-01-01 NOTE — ASSESSMENT
[FreeTextEntry1] : \par Dysuria\par Increase Po fluid intake \par UA and UCX today \par Cipro 250mg BID\par \par JOHNSON\par EKG: A fib\par Cardiology follow-up - spoke with pt's cardiologist, pt was seen today. \par started on Eliquis 2.5mg and Toprol was increased to 50mg daily by cardiology\par \par abnormal TSH/T4 in October-pt never returned for repeat TSH/T4\par we'll check TSH/T4 today \par \par HTN\par cont Losartan 50 mg daily \par Metoprolol increased to 50 mg daily \par \par Hx of anxiety\par follows with psychiatrist \par on Citalopram 1/2 tab qhs\par \par HLD\par cont Simvastatin 20 mg daily -renewed today\par cont low chol/low fat diet\par \par Labs ordered pt to follow-up in 1 week for results  \par

## 2023-01-01 NOTE — HISTORY OF PRESENT ILLNESS
[de-identified] : Patient is a 88 year old female with history of HTN, HLD, anxiety, presents for acute visit\par \par Patient reports having increased shortness of breath on exertion for the pat week. Denies any chest pain, palpitations N/V or abdominal pain. Also since Thursday she has been having burning with urination and urinary frequency. Denies any fevers, chills

## 2023-01-03 LAB — TSH SERPL-ACNC: 11.5 UIU/ML

## 2023-01-11 ENCOUNTER — LABORATORY RESULT (OUTPATIENT)
Age: 88
End: 2023-01-11

## 2023-01-11 ENCOUNTER — APPOINTMENT (OUTPATIENT)
Dept: CARDIOLOGY | Facility: CLINIC | Age: 88
End: 2023-01-11
Payer: MEDICARE

## 2023-01-11 ENCOUNTER — NON-APPOINTMENT (OUTPATIENT)
Age: 88
End: 2023-01-11

## 2023-01-11 VITALS
WEIGHT: 133 LBS | SYSTOLIC BLOOD PRESSURE: 185 MMHG | OXYGEN SATURATION: 97 % | RESPIRATION RATE: 12 BRPM | HEART RATE: 102 BPM | DIASTOLIC BLOOD PRESSURE: 82 MMHG | HEIGHT: 67 IN | BODY MASS INDEX: 20.88 KG/M2

## 2023-01-11 LAB
BASOPHILS # BLD AUTO: 0.05 K/UL
BASOPHILS NFR BLD AUTO: 0.8 %
EOSINOPHIL # BLD AUTO: 0.11 K/UL
EOSINOPHIL NFR BLD AUTO: 1.7 %
ESTIMATED AVERAGE GLUCOSE: 120 MG/DL
HBA1C MFR BLD HPLC: 5.8 %
HCT VFR BLD CALC: 50.5 %
HGB BLD-MCNC: 16.5 G/DL
IMM GRANULOCYTES NFR BLD AUTO: 0.3 %
LYMPHOCYTES # BLD AUTO: 1.17 K/UL
LYMPHOCYTES NFR BLD AUTO: 17.7 %
MAN DIFF?: NORMAL
MCHC RBC-ENTMCNC: 31.7 PG
MCHC RBC-ENTMCNC: 32.7 GM/DL
MCV RBC AUTO: 96.9 FL
MONOCYTES # BLD AUTO: 0.56 K/UL
MONOCYTES NFR BLD AUTO: 8.5 %
NEUTROPHILS # BLD AUTO: 4.71 K/UL
NEUTROPHILS NFR BLD AUTO: 71 %
PLATELET # BLD AUTO: 220 K/UL
RBC # BLD: 5.21 M/UL
RBC # FLD: 13.7 %
WBC # FLD AUTO: 6.62 K/UL

## 2023-01-11 PROCEDURE — 93000 ELECTROCARDIOGRAM COMPLETE: CPT

## 2023-01-11 PROCEDURE — 99213 OFFICE O/P EST LOW 20 MIN: CPT | Mod: 25

## 2023-01-11 NOTE — HISTORY OF PRESENT ILLNESS
[FreeTextEntry1] : Gia continues to worry a lot. Upset about being alone and  for so long. Takes BP regularly. Still SOB with minimal activity. Taking amiodarone daily now and does not want to stop.

## 2023-01-11 NOTE — DISCUSSION/SUMMARY
[FreeTextEntry1] : The patient is an 88-year-old anxious female HTN, HLD, HH ,persistent A-fib with dyspnea.\par #1 Afib- c/w eliquis 2.5mg bid, c/w  toprol 25mg,  amiodarone 200mg daily, event monitor all afib, discussed cardioversion and refuses, does not want to stop amiodarone\par #2 Htn- bp well controlled. continue losartan 50mg and metoprolol\par #3 Lipids- continue simvastatin 20mg\par #4 Anxiety- on clonazepam and may safely d/c citalopram 5mg, stress management and discuss with daughter getting help at home. Reassurance and emotional support provided. Friend with her today.\par #5 PAD- does not want f/u care\par  [EKG obtained to assist in diagnosis and management of assessed problem(s)] : EKG obtained to assist in diagnosis and management of assessed problem(s)

## 2023-01-12 LAB
ALBUMIN SERPL ELPH-MCNC: 4 G/DL
ALP BLD-CCNC: 95 U/L
ALT SERPL-CCNC: 66 U/L
ANION GAP SERPL CALC-SCNC: 15 MMOL/L
AST SERPL-CCNC: 58 U/L
BILIRUB SERPL-MCNC: 0.7 MG/DL
BUN SERPL-MCNC: 28 MG/DL
CALCIUM SERPL-MCNC: 9.1 MG/DL
CHLORIDE SERPL-SCNC: 97 MMOL/L
CHOLEST SERPL-MCNC: 173 MG/DL
CO2 SERPL-SCNC: 28 MMOL/L
CREAT SERPL-MCNC: 1.53 MG/DL
EGFR: 33 ML/MIN/1.73M2
GLUCOSE SERPL-MCNC: 106 MG/DL
HDLC SERPL-MCNC: 78 MG/DL
LDLC SERPL CALC-MCNC: 79 MG/DL
NONHDLC SERPL-MCNC: 96 MG/DL
POTASSIUM SERPL-SCNC: 3.6 MMOL/L
PROT SERPL-MCNC: 6.8 G/DL
SODIUM SERPL-SCNC: 140 MMOL/L
TRIGL SERPL-MCNC: 84 MG/DL
TSH SERPL-ACNC: 8.93 UIU/ML

## 2023-02-03 ENCOUNTER — APPOINTMENT (OUTPATIENT)
Dept: INTERNAL MEDICINE | Facility: CLINIC | Age: 88
End: 2023-02-03
Payer: MEDICARE

## 2023-02-03 ENCOUNTER — LABORATORY RESULT (OUTPATIENT)
Age: 88
End: 2023-02-03

## 2023-02-03 ENCOUNTER — INPATIENT (INPATIENT)
Facility: HOSPITAL | Age: 88
LOS: 2 days | Discharge: HOME HEALTH | DRG: 315 | End: 2023-02-06
Attending: INTERNAL MEDICINE | Admitting: STUDENT IN AN ORGANIZED HEALTH CARE EDUCATION/TRAINING PROGRAM
Payer: MEDICARE

## 2023-02-03 VITALS
BODY MASS INDEX: 20.88 KG/M2 | HEART RATE: 92 BPM | DIASTOLIC BLOOD PRESSURE: 96 MMHG | SYSTOLIC BLOOD PRESSURE: 158 MMHG | TEMPERATURE: 96.6 F | OXYGEN SATURATION: 95 % | RESPIRATION RATE: 12 BRPM | WEIGHT: 133 LBS | HEIGHT: 67 IN

## 2023-02-03 VITALS
RESPIRATION RATE: 20 BRPM | HEART RATE: 92 BPM | SYSTOLIC BLOOD PRESSURE: 200 MMHG | TEMPERATURE: 98 F | DIASTOLIC BLOOD PRESSURE: 116 MMHG | OXYGEN SATURATION: 95 %

## 2023-02-03 LAB
BASE EXCESS BLDV CALC-SCNC: 6.8 MMOL/L — HIGH (ref -2–3)
BASOPHILS # BLD AUTO: 0.05 K/UL — SIGNIFICANT CHANGE UP (ref 0–0.2)
BASOPHILS NFR BLD AUTO: 0.7 % — SIGNIFICANT CHANGE UP (ref 0–2)
BLOOD GAS VENOUS - CREATININE: SIGNIFICANT CHANGE UP MG/DL (ref 0.5–1.3)
CA-I SERPL-SCNC: 1.12 MMOL/L — LOW (ref 1.15–1.33)
CHLORIDE BLDV-SCNC: 97 MMOL/L — SIGNIFICANT CHANGE UP (ref 96–108)
CO2 BLDV-SCNC: 35 MMOL/L — HIGH (ref 22–26)
EOSINOPHIL # BLD AUTO: 0.13 K/UL — SIGNIFICANT CHANGE UP (ref 0–0.5)
EOSINOPHIL NFR BLD AUTO: 1.8 % — SIGNIFICANT CHANGE UP (ref 0–6)
GAS PNL BLDV: 135 MMOL/L — LOW (ref 136–145)
GAS PNL BLDV: SIGNIFICANT CHANGE UP
GAS PNL BLDV: SIGNIFICANT CHANGE UP
GLUCOSE BLDV-MCNC: 124 MG/DL — HIGH (ref 70–99)
HCO3 BLDV-SCNC: 33 MMOL/L — HIGH (ref 22–29)
HCT VFR BLD CALC: 52.5 % — HIGH (ref 34.5–45)
HCT VFR BLDA CALC: 53 % — HIGH (ref 34.5–46.5)
HGB BLD CALC-MCNC: 17.5 G/DL — HIGH (ref 11.7–16.1)
HGB BLD-MCNC: 16.7 G/DL — HIGH (ref 11.5–15.5)
IMM GRANULOCYTES NFR BLD AUTO: 0.6 % — SIGNIFICANT CHANGE UP (ref 0–0.9)
LACTATE BLDV-MCNC: 2 MMOL/L — SIGNIFICANT CHANGE UP (ref 0.5–2)
LYMPHOCYTES # BLD AUTO: 1.1 K/UL — SIGNIFICANT CHANGE UP (ref 1–3.3)
LYMPHOCYTES # BLD AUTO: 15.4 % — SIGNIFICANT CHANGE UP (ref 13–44)
MCHC RBC-ENTMCNC: 30.3 PG — SIGNIFICANT CHANGE UP (ref 27–34)
MCHC RBC-ENTMCNC: 31.8 GM/DL — LOW (ref 32–36)
MCV RBC AUTO: 95.3 FL — SIGNIFICANT CHANGE UP (ref 80–100)
MONOCYTES # BLD AUTO: 0.56 K/UL — SIGNIFICANT CHANGE UP (ref 0–0.9)
MONOCYTES NFR BLD AUTO: 7.8 % — SIGNIFICANT CHANGE UP (ref 2–14)
NEUTROPHILS # BLD AUTO: 5.26 K/UL — SIGNIFICANT CHANGE UP (ref 1.8–7.4)
NEUTROPHILS NFR BLD AUTO: 73.7 % — SIGNIFICANT CHANGE UP (ref 43–77)
NRBC # BLD: 0 /100 WBCS — SIGNIFICANT CHANGE UP (ref 0–0)
PCO2 BLDV: 51 MMHG — HIGH (ref 39–42)
PH BLDV: 7.42 — SIGNIFICANT CHANGE UP (ref 7.32–7.43)
PLATELET # BLD AUTO: 202 K/UL — SIGNIFICANT CHANGE UP (ref 150–400)
PO2 BLDV: 36 MMHG — SIGNIFICANT CHANGE UP (ref 25–45)
POTASSIUM BLDV-SCNC: 2.7 MMOL/L — CRITICAL LOW (ref 3.5–5.1)
RBC # BLD: 5.51 M/UL — HIGH (ref 3.8–5.2)
RBC # FLD: 14.3 % — SIGNIFICANT CHANGE UP (ref 10.3–14.5)
SAO2 % BLDV: 58.1 % — LOW (ref 67–88)
WBC # BLD: 7.14 K/UL — SIGNIFICANT CHANGE UP (ref 3.8–10.5)
WBC # FLD AUTO: 7.14 K/UL — SIGNIFICANT CHANGE UP (ref 3.8–10.5)

## 2023-02-03 PROCEDURE — 93970 EXTREMITY STUDY: CPT | Mod: 26

## 2023-02-03 PROCEDURE — 99285 EMERGENCY DEPT VISIT HI MDM: CPT

## 2023-02-03 PROCEDURE — 99215 OFFICE O/P EST HI 40 MIN: CPT

## 2023-02-03 RX ORDER — CIPROFLOXACIN HYDROCHLORIDE 250 MG/1
250 TABLET, FILM COATED ORAL
Qty: 10 | Refills: 0 | Status: DISCONTINUED | COMMUNITY
Start: 2022-12-05 | End: 2023-02-03

## 2023-02-03 NOTE — ED ADULT NURSE NOTE - OBJECTIVE STATEMENT
Patient is a 87 y/o female with PMH of HTN, HLD, Afib on Eliquis and amiodarone presenting to the ED via waiting room with c/o LLE swelling worsening x 1 month. Pt's outpatient doctor told pt to come to ED to r/o DVT vs PAD. pt denies trauma/injury to leg. Patient A&Ox4, states she ambulates at home with assistance. Airway patent, breathing unlabored, no accessory muscle use noted, denies SOB. Pt states she had chest pain earlier today which happens from time to time, denies chest pain at this time, peripheral pulses strong, cap refill less than 2 secs. Denies dizziness, denies blurred vision. Abdomen nondistended, denies n/v/d, denies constipation. Denies difficult/painful/frequent urination. Moving all extremities w/o difficulty. Skin warm/intact. Denies fever, cough, or chills. Plan of care explained, side rails raised, bed in lowest position, comfort and safety maintained.

## 2023-02-03 NOTE — ED PROVIDER NOTE - ATTENDING CONTRIBUTION TO CARE
Private Physician BERLIN Jha [phone] [facility]  88y female pmh HTN,HLD,Afib on Eliquis/Amiodarone, Gerd comes to ed c/o sami lower extr swelling with itching lle and discoloration, No pain, no fever, trauma, Pt was seen at Private Physician office and referred to ed for eval of dvt/pad. PE Elderly female awake alert normocephalic atraumatic neck supple chest clear anterior & posterior CV irregular irregualar, Abd soft +bs no mass guarding neuro gcs 15 speech  fluent power 5/5 all ext, MSK Sami lower extr swelling Left>Rt, w bluish coloration left foot cool w/o palpable pulse  Karel Rivas MD, Facep

## 2023-02-03 NOTE — ED PROVIDER NOTE - PHYSICAL EXAMINATION
General:  non-toxic, NAD  HEENT:  NCAT, PERRL  Cardiac:  RRR, no murmurs, 2+ peripheral pulses bilateral upper extremity and right lower extremity.  Not palpable in left lower extremity.  Left lower extremity dopplerable DT.  Chest:  CTA  Abdomen:  soft, non-distended, bowel sounds present, no ttp, no rebound or guarding  Extremities: Left foot edema, partially cyanotic.  No calf tenderness, or leg size discrepancies  Skin: no rashes  Neuro:  AAOx4, 5+motor, sensory grossly intact  Psych:  mood and affect appropriate

## 2023-02-03 NOTE — ED PROVIDER NOTE - OBJECTIVE STATEMENT
88-year-old female  pmhx of hypertension, hyperlipidemia, A. fib on Eliquis and amiodarone comes to ED w/ left leg/foot swelling.  Reports that symptoms have been worsening over the past month.  Patient saw outpatient doctor Dr. Denise Katz who told him to come to emergency department due to the presentation for rule out DVT versus PAD.  Their pain/symptom is moderate to severe, constant, non mediating with rest. Started randomly. Denies falls, trauma, headache, cough, rhinorrhea, chest pain, shortness of breath, abdominal pain, nausea, vomiting, diarrhea, urinary symptoms, stool symptoms.

## 2023-02-03 NOTE — ED PROVIDER NOTE - CLINICAL SUMMARY MEDICAL DECISION MAKING FREE TEXT BOX
Impression: 88-year-old female  pmhx of hypertension, hyperlipidemia, A. fib on Eliquis and amiodarone comes to ED w/ left leg/foot swelling.  Their symptoms and exam findings of not palpable peripheral pulses in left foot as well as partial edema of the foot and cyanosis are concerning for DVT, PAD.  Vascular consulted.    Ordered labs, imaging, medications for diagnosis, management, and treatment.

## 2023-02-03 NOTE — ED PROVIDER NOTE - PROGRESS NOTE DETAILS
Vascular consulted to see patient  for presentation. Endorsed to Dr ZE Rivas MD, Facep Plans admit for fluid overload possible congestive heart failure development from atrial fibrillation.

## 2023-02-04 DIAGNOSIS — I48.91 UNSPECIFIED ATRIAL FIBRILLATION: ICD-10-CM

## 2023-02-04 DIAGNOSIS — M79.89 OTHER SPECIFIED SOFT TISSUE DISORDERS: ICD-10-CM

## 2023-02-04 DIAGNOSIS — Z29.9 ENCOUNTER FOR PROPHYLACTIC MEASURES, UNSPECIFIED: ICD-10-CM

## 2023-02-04 DIAGNOSIS — E78.5 HYPERLIPIDEMIA, UNSPECIFIED: ICD-10-CM

## 2023-02-04 DIAGNOSIS — I10 ESSENTIAL (PRIMARY) HYPERTENSION: ICD-10-CM

## 2023-02-04 DIAGNOSIS — E87.70 FLUID OVERLOAD, UNSPECIFIED: ICD-10-CM

## 2023-02-04 DIAGNOSIS — N17.9 ACUTE KIDNEY FAILURE, UNSPECIFIED: ICD-10-CM

## 2023-02-04 DIAGNOSIS — Z90.49 ACQUIRED ABSENCE OF OTHER SPECIFIED PARTS OF DIGESTIVE TRACT: Chronic | ICD-10-CM

## 2023-02-04 DIAGNOSIS — K21.9 GASTRO-ESOPHAGEAL REFLUX DISEASE WITHOUT ESOPHAGITIS: ICD-10-CM

## 2023-02-04 LAB
ALBUMIN SERPL ELPH-MCNC: 4.4 G/DL — SIGNIFICANT CHANGE UP (ref 3.3–5)
ALP SERPL-CCNC: 114 U/L — SIGNIFICANT CHANGE UP (ref 40–120)
ALT FLD-CCNC: 76 U/L — HIGH (ref 10–45)
ANION GAP SERPL CALC-SCNC: 19 MMOL/L — HIGH (ref 5–17)
APPEARANCE UR: CLEAR — SIGNIFICANT CHANGE UP
AST SERPL-CCNC: 70 U/L — HIGH (ref 10–40)
BACTERIA # UR AUTO: NEGATIVE — SIGNIFICANT CHANGE UP
BILIRUB SERPL-MCNC: 0.5 MG/DL — SIGNIFICANT CHANGE UP (ref 0.2–1.2)
BILIRUB UR-MCNC: NEGATIVE — SIGNIFICANT CHANGE UP
BUN SERPL-MCNC: 41 MG/DL — HIGH (ref 7–23)
CALCIUM SERPL-MCNC: 9.8 MG/DL — SIGNIFICANT CHANGE UP (ref 8.4–10.5)
CHLORIDE SERPL-SCNC: 99 MMOL/L — SIGNIFICANT CHANGE UP (ref 96–108)
CO2 SERPL-SCNC: 24 MMOL/L — SIGNIFICANT CHANGE UP (ref 22–31)
COLOR SPEC: COLORLESS — SIGNIFICANT CHANGE UP
CREAT SERPL-MCNC: 1.5 MG/DL — HIGH (ref 0.5–1.3)
DIFF PNL FLD: ABNORMAL
EGFR: 33 ML/MIN/1.73M2 — LOW
EPI CELLS # UR: 0 /HPF — SIGNIFICANT CHANGE UP
FLUAV AG NPH QL: SIGNIFICANT CHANGE UP
FLUBV AG NPH QL: SIGNIFICANT CHANGE UP
GLUCOSE SERPL-MCNC: 125 MG/DL — HIGH (ref 70–99)
GLUCOSE UR QL: NEGATIVE — SIGNIFICANT CHANGE UP
KETONES UR-MCNC: NEGATIVE — SIGNIFICANT CHANGE UP
LEUKOCYTE ESTERASE UR-ACNC: NEGATIVE — SIGNIFICANT CHANGE UP
MAGNESIUM SERPL-MCNC: 2.5 MG/DL — SIGNIFICANT CHANGE UP (ref 1.6–2.6)
NITRITE UR-MCNC: NEGATIVE — SIGNIFICANT CHANGE UP
NT-PROBNP SERPL-SCNC: 1963 PG/ML — HIGH (ref 0–300)
PH UR: 7 — SIGNIFICANT CHANGE UP (ref 5–8)
POTASSIUM SERPL-MCNC: 3.2 MMOL/L — LOW (ref 3.5–5.3)
POTASSIUM SERPL-SCNC: 3.2 MMOL/L — LOW (ref 3.5–5.3)
PROT SERPL-MCNC: 7.8 G/DL — SIGNIFICANT CHANGE UP (ref 6–8.3)
PROT UR-MCNC: NEGATIVE — SIGNIFICANT CHANGE UP
RBC CASTS # UR COMP ASSIST: 4 /HPF — SIGNIFICANT CHANGE UP (ref 0–4)
RSV RNA NPH QL NAA+NON-PROBE: SIGNIFICANT CHANGE UP
SARS-COV-2 RNA SPEC QL NAA+PROBE: SIGNIFICANT CHANGE UP
SODIUM SERPL-SCNC: 142 MMOL/L — SIGNIFICANT CHANGE UP (ref 135–145)
SP GR SPEC: 1.01 — LOW (ref 1.01–1.02)
TROPONIN T, HIGH SENSITIVITY RESULT: 13 NG/L — SIGNIFICANT CHANGE UP (ref 0–51)
TROPONIN T, HIGH SENSITIVITY RESULT: 14 NG/L — SIGNIFICANT CHANGE UP (ref 0–51)
UROBILINOGEN FLD QL: NEGATIVE — SIGNIFICANT CHANGE UP
WBC UR QL: 0 /HPF — SIGNIFICANT CHANGE UP (ref 0–5)

## 2023-02-04 PROCEDURE — 71045 X-RAY EXAM CHEST 1 VIEW: CPT | Mod: 26

## 2023-02-04 PROCEDURE — 99223 1ST HOSP IP/OBS HIGH 75: CPT | Mod: GC

## 2023-02-04 RX ORDER — SIMVASTATIN 20 MG/1
20 TABLET, FILM COATED ORAL AT BEDTIME
Refills: 0 | Status: DISCONTINUED | OUTPATIENT
Start: 2023-02-04 | End: 2023-02-06

## 2023-02-04 RX ORDER — LANOLIN ALCOHOL/MO/W.PET/CERES
3 CREAM (GRAM) TOPICAL AT BEDTIME
Refills: 0 | Status: DISCONTINUED | OUTPATIENT
Start: 2023-02-04 | End: 2023-02-06

## 2023-02-04 RX ORDER — POTASSIUM CHLORIDE 20 MEQ
40 PACKET (EA) ORAL ONCE
Refills: 0 | Status: COMPLETED | OUTPATIENT
Start: 2023-02-04 | End: 2023-02-04

## 2023-02-04 RX ORDER — FAMOTIDINE 10 MG/ML
1 INJECTION INTRAVENOUS
Qty: 0 | Refills: 0 | DISCHARGE

## 2023-02-04 RX ORDER — METOPROLOL TARTRATE 50 MG
1 TABLET ORAL
Qty: 0 | Refills: 0 | DISCHARGE

## 2023-02-04 RX ORDER — FAMOTIDINE 10 MG/ML
20 INJECTION INTRAVENOUS DAILY
Refills: 0 | Status: DISCONTINUED | OUTPATIENT
Start: 2023-02-04 | End: 2023-02-06

## 2023-02-04 RX ORDER — CLONAZEPAM 1 MG
0.5 TABLET ORAL
Refills: 0 | Status: DISCONTINUED | OUTPATIENT
Start: 2023-02-04 | End: 2023-02-06

## 2023-02-04 RX ORDER — SIMVASTATIN 20 MG/1
1 TABLET, FILM COATED ORAL
Qty: 0 | Refills: 0 | DISCHARGE

## 2023-02-04 RX ORDER — POLYETHYLENE GLYCOL 3350 17 G/17G
17 POWDER, FOR SOLUTION ORAL DAILY
Refills: 0 | Status: DISCONTINUED | OUTPATIENT
Start: 2023-02-04 | End: 2023-02-06

## 2023-02-04 RX ORDER — FUROSEMIDE 40 MG
40 TABLET ORAL ONCE
Refills: 0 | Status: COMPLETED | OUTPATIENT
Start: 2023-02-04 | End: 2023-02-04

## 2023-02-04 RX ORDER — AMIODARONE HYDROCHLORIDE 400 MG/1
200 TABLET ORAL DAILY
Refills: 0 | Status: DISCONTINUED | OUTPATIENT
Start: 2023-02-04 | End: 2023-02-06

## 2023-02-04 RX ORDER — APIXABAN 2.5 MG/1
1 TABLET, FILM COATED ORAL
Qty: 0 | Refills: 0 | DISCHARGE

## 2023-02-04 RX ORDER — CLONAZEPAM 1 MG
1 TABLET ORAL
Qty: 0 | Refills: 0 | DISCHARGE

## 2023-02-04 RX ORDER — ACETAMINOPHEN 500 MG
650 TABLET ORAL EVERY 6 HOURS
Refills: 0 | Status: DISCONTINUED | OUTPATIENT
Start: 2023-02-04 | End: 2023-02-06

## 2023-02-04 RX ORDER — AMIODARONE HYDROCHLORIDE 400 MG/1
1 TABLET ORAL
Qty: 0 | Refills: 0 | DISCHARGE

## 2023-02-04 RX ORDER — METOPROLOL TARTRATE 50 MG
25 TABLET ORAL DAILY
Refills: 0 | Status: DISCONTINUED | OUTPATIENT
Start: 2023-02-04 | End: 2023-02-06

## 2023-02-04 RX ORDER — APIXABAN 2.5 MG/1
2.5 TABLET, FILM COATED ORAL
Refills: 0 | Status: DISCONTINUED | OUTPATIENT
Start: 2023-02-04 | End: 2023-02-06

## 2023-02-04 RX ADMIN — POLYETHYLENE GLYCOL 3350 17 GRAM(S): 17 POWDER, FOR SOLUTION ORAL at 21:48

## 2023-02-04 RX ADMIN — Medication 40 MILLIGRAM(S): at 02:47

## 2023-02-04 RX ADMIN — Medication 25 MILLIGRAM(S): at 14:18

## 2023-02-04 RX ADMIN — SIMVASTATIN 20 MILLIGRAM(S): 20 TABLET, FILM COATED ORAL at 21:23

## 2023-02-04 RX ADMIN — Medication 0.5 MILLIGRAM(S): at 14:20

## 2023-02-04 RX ADMIN — Medication 40 MILLIEQUIVALENT(S): at 01:47

## 2023-02-04 RX ADMIN — APIXABAN 2.5 MILLIGRAM(S): 2.5 TABLET, FILM COATED ORAL at 14:19

## 2023-02-04 RX ADMIN — AMIODARONE HYDROCHLORIDE 200 MILLIGRAM(S): 400 TABLET ORAL at 18:04

## 2023-02-04 RX ADMIN — FAMOTIDINE 20 MILLIGRAM(S): 10 INJECTION INTRAVENOUS at 14:20

## 2023-02-04 NOTE — H&P ADULT - ATTENDING COMMENTS
88F with PMHx of HTN and chronic atrial fibrillation, sent in by PMD to rule out DVT in setting of L>>R leg swelling. Patient states started to develop L>>R leg swelling which worsened at the end of the day. Also has been having periodic dyspnea on exertion, but denies symptoms at rest. No chest pain. In the ED she was given Lasix 40 IVP. They thought they could not palpate pulses; thus, vascular was called and signed off. Admitted due to concern for volume overload, though possibly was actually euvolemic and at worst mildly hypervolemic. Also states left leg worsened at the end of the day. Minor right leg swelling.    VSS  On exam non-pitting edema from feet to knee most notable on left leg  Appears hypovolemic  Pulses 2+ PT/DP in left foot  Slightly cool to touch bilateral legs    Labs personally reviewed  H/H: baseline  Cr: 1.5 (baseline ~1)  pro-BNP 1600    AST/ALT: 70s (baseline)    CXR personally reviewed by me: moderate R PLEFF, small L PLEFF, no pulmonary edema    Duplex: NO DVT BL    A/P:  Likely venous insufficiency causing leg swelling. Legs with signs of such with hyperpigmented areas. Do not recommend aggressive management at this time. No DVT. Patient can use compression stockings and elevate legs as needed. She appears hypovolemic and I'm less concerned for new onset HF. Already got Lasix, let's see how she does off diuretics. Can do another TTE to see if there are any acute changes.     She likely does have an IRA: monitor off ARB, send urine electrolytes for FeUREA   Mild transaminitis may be due to amiodarone: per review outpatient cardiologist wanted to stop amiodarone, but patient resistant    PT consult given weakness  S&S consult given possible dysphagia, but not a barrier to discharge & can do a regular diet for now  TFTs tomorrow morning given prior history of abnormality (non-suppressed TSH)    Likely discharge in 1-2 days if creatinine improves. No further diuresis planned by me.

## 2023-02-04 NOTE — H&P ADULT - PROBLEM SELECTOR PLAN 7
- on eliquis for Afib   - - on eliquis for Afib   -S+S evaluation for dysphagia per history  PT consult placed

## 2023-02-04 NOTE — CONSULT NOTE ADULT - ASSESSMENT
Assessment: 88 year old woman presents sent in from PCP with foot swelling and pain.      Recs:  - No evidence of arterial insufficiency on physical exam with normal pulses, no wounds, no deficits   - R/o DVT with venous duplex  - If no DVT, dispo per ED  - Follow up with PCP   - Please recontact with any concerns  - Compression and elevation of RLE   - D/w Fellow on Behalf of Attending     Ventura Giron MD, PGY2   Vascular Surgery   [id number]

## 2023-02-04 NOTE — CONSULT NOTE ADULT - SUBJECTIVE AND OBJECTIVE BOX
VASCULAR SURGERY CONSULT NOTE  --------------------------------------------------------------------------------------------    Patient is a 88y old  Female who presents with a chief complaint of foot swelling    HPI: 88 Year old woman w/ PMH hypertension, hyperlipidemia, A. fib on Eliquis and amiodarone comes to ED w/ left leg/foot swelling.  Walks without assistance, denies chest pain, no foot wounds.  No history of malignancy. No claudication symptoms.  Pain is mild and in foot, mild edema. No fevers or chills, otherwise feels well. No history of PAD has never seen a vascular surgeon.     ROS: 10-system review is otherwise negative except HPI above.      PAST MEDICAL & SURGICAL HISTORY:    FAMILY HISTORY:    SOCIAL HISTORY:      ALLERGIES: Lipitor (Eye Irritation)    HOME MEDICATIONS:     CURRENT MEDICATIONS  MEDICATIONS (STANDING): potassium chloride    Tablet ER 40 milliEquivalent(s) Oral once    MEDICATIONS (PRN):  --------------------------------------------------------------------------------------------    Vitals:   T(C): 36.5 (02-03-23 @ 17:54), Max: 36.5 (02-03-23 @ 17:54)  HR: 92 (02-03-23 @ 17:54) (92 - 92)  BP: 200/116 (02-03-23 @ 17:54) (200/116 - 200/116)  RR: 20 (02-03-23 @ 17:54) (20 - 20)  SpO2: 95% (02-03-23 @ 17:54) (95% - 95%)  CAPILLARY BLOOD GLUCOSE      PHYSICAL EXAM:   General: NAD, Lying in bed comfortably  Neuro: A+Ox3  HEENT: NC/AT, EOMI  Cardio: generally well perfused   Resp: Good effort, room air   GI/Abd: Soft, NT/ND, no rebound/guarding, no masses palpated  Vascular: bilateral Dp and PT pulses palpable  Skin: Intact, no wounds   Musculoskeletal: All 4 extremities moving spontaneously, no limitations, no motor or sensory deficits   --------------------------------------------------------------------------------------------    LABS  CBC (02-03 @ 23:17)                              16.7<H>                         7.14    )----------------(  202        73.7  % Neutrophils, 15.4  % Lymphocytes, ANC: 5.26                                52.5<H>    BMP (02-03 @ 23:17)             142     |  99      |  41<H> 		Ca++ --      Ca 9.8                ---------------------------------( 125<H>		Mg 2.5                3.2<L>  |  24      |  1.50<H>			Ph --        LFTs (02-03 @ 23:17)      TPro 7.8 / Alb 4.4 / TBili 0.5 / DBili -- / AST 70<H> / ALT 76<H> / AlkPhos 114          VBG (02-03 @ 23:00)     7.42 / 51<H> / 36 / 33<H> / 6.8<H> / 58.1<L>%     Lactate: 2.0    --------------------------------------------------------------------------------------------    MICROBIOLOGY      --------------------------------------------------------------------------------------------    IMAGING

## 2023-02-04 NOTE — H&P ADULT - HISTORY OF PRESENT ILLNESS
NOTE INCOMPLETE/STILL IN PROGRESS     HPI: 88 Year old woman w/ PMH hypertension, hyperlipidemia, A. fib on Eliquis and amiodarone comes to ED w/ left leg/foot swelling.  Walks with cane at baseline with no changes to functional status as of recently. ,Denies chest pain, no foot wounds.  No history of malignancy. No claudication symptoms.  Pain is mild and in foot. Patient reports worsening symptoms over the past month. No fevers or chills, otherwise feels well. No history of PAD has never seen a vascular surgeon. Vascular surgery consulted in the ED and     CXR: Moderate right and small left pleural effusion.   Venous Duplex of the bilateral lower extremity: No DVT.      NOTE INCOMPLETE/STILL IN PROGRESS     HPI: 88 Year old woman w/ PMH hypertension, hyperlipidemia, A. fib on Eliquis and amiodarone comes to ED w/ left leg/foot swelling.  Walks with cane at baseline with no changes to functional status as of recently. ,Denies chest pain, no foot wounds.  No history of malignancy. No claudication symptoms.  Pain is mild and in foot. Patient reports worsening symptoms over the past month. No fevers or chills, otherwise feels well. No history of PAD has never seen a vascular surgeon. Vascular surgery consulted in the ED recommending Venous duplex DVT study and to follow-up outpatient.     In the ED, patient afebrile, BP: (130-180)/(90-100s) HR 83-89, maintaining adequate O2 saturations on RA.     given Lasix 40 mg IV x 1, PO potassium 40 meq PO x 1     CXR: Moderate right and small left pleural effusion.   Venous Duplex of the bilateral lower extremity: No DVT.      NOTE INCOMPLETE/STILL IN PROGRESS     HPI: 88 Year old woman w/ PMH hypertension, hyperlipidemia, A. fib on Eliquis and amiodarone comes to ED w/ left leg/foot swelling.  Walks with cane at baseline with no changes to functional status as of recently. Denies chest pain, no foot wounds.  No claudication symptoms.  Pain is mild and in foot. Patient reports worsening symptoms over the past month. No fevers or chills, otherwise feels well. History of PAD per outpatient records but patient had not seen vascular surgeon. . Vascular surgery consulted in the ED recommending Venous duplex DVT study and to follow-up outpatient.     In the ED, patient afebrile, BP: (130-180)/(90-100s) HR 83-89, maintaining adequate O2 saturations on RA.     given Lasix 40 mg IV x 1, PO potassium 40 meq PO x 1     CXR: Moderate right and small left pleural effusion.   Venous Duplex of the bilateral lower extremity: No DVT.          HPI: 88 Year old woman w/ PMH hypertension, hyperlipidemia, A. fib on Eliquis and amiodarone comes to ED w/ left leg/foot swelling.  Walks with cane at baseline with no changes to functional status as of recently. Denies chest pain, no foot wounds.  No claudication symptoms.  Pain is mild and in foot. Patient reports worsening symptoms over the past month. No fevers or chills, otherwise feels well. History of PAD per outpatient records but patient had not seen vascular surgeon. . Vascular surgery consulted in the ED recommending Venous duplex DVT study and to follow-up outpatient.  Of note patient also endorsing exertional dyspnea as well as dysphagia.     In the ED, patient afebrile, BP: (130-180)/(90-100s) HR 83-89, maintaining adequate O2 saturations on RA.     given Lasix 40 mg IV x 1, PO potassium 40 meq PO x 1     CXR: Moderate right and small left pleural effusion.   Venous Duplex of the bilateral lower extremity: No DVT.

## 2023-02-04 NOTE — H&P ADULT - ASSESSMENT
88 Year old woman w/ PMH hypertension, hyperlipidemia, A.fib on Eliquis and amiodarone comes to ED w/ left leg swelling and pain L>R, sent in by PMD to the ED due to concern of PAD vs DVT, on presentation DVT duplex wnl, BNP>1900, s/p Lasix 40 mg IV x 1, presentation likely 2/2 HF.  Vascular surgery consulted with no intervention recommended  88 Year old woman w/ PMH hypertension, hyperlipidemia, A.fib on Eliquis and amiodarone comes to ED w/ left leg swelling and pain L>R, sent in by PMD to the ED due to concern of PAD vs DVT, on presentation DVT duplex wnl, BNP>1900, s/p Lasix 40 mg IV x 1, presentation likely 2/2 HF.  Vascular surgery consulted with no intervention recommended. Patient also with IRA

## 2023-02-04 NOTE — H&P ADULT - NSHPSOCIALHISTORY_GEN_ALL_CORE
Denies ETOH use, tobacco use, illicit drug use. Denies ETOH use, tobacco use, illicit drug use. Patient lives alone in a house and ambulates using a cane. Patient states that she requires no help in maintaining her ADLs.

## 2023-02-04 NOTE — ED ADULT NURSE REASSESSMENT NOTE - NS ED NURSE REASSESS COMMENT FT1
Patient resting comfortably, aware of plan of care, pt well-appearing, meal provided, BP remains elevated, pt asymptomatic, will cont to monitor, MD aware, pending urine sample, pt states she will attempt to provide urine sample shortly, call bell within reach, side rails raised, bed in lowest position, comfort and safety measures maintained.

## 2023-02-04 NOTE — H&P ADULT - PROBLEM SELECTOR PLAN 1
- per history 1 month of L>R lower extremity swelling, coldness in the lower extremity however palpable pulses, Venous Duplex of b/l lower extremity demonstrating no signs of DVT  - vascular surgery consulted with no elevated Cr of 1.5 (baseline Cr  of 0.6-0.7,   - UA grossly negative  - avoid NSAIDs, ACE/ARBs, nephrotoxic drugs, and contrast  - daily weight, strict I/O  - consider urine lytes, s/p Lasix 40 mg IV, follow-up TTE  - monitor Cr elevated Cr of 1.5 (baseline Cr  of 0.6-0.7)   - UA grossly negative  - avoid NSAIDs, ACE/ARBs, nephrotoxic drugs, and contrast  - daily weight, strict I/O  - consider urine lytes, s/p Lasix 40 mg IV, follow-up TTE  - monitor Cr

## 2023-02-04 NOTE — H&P ADULT - NSHPLABSRESULTS_GEN_ALL_CORE
LABS:                        16.7   7.14  )-----------( 202      ( 2023 23:17 )             52.5         142  |  99  |  41<H>  ----------------------------<  125<H>  3.2<L>   |  24  |  1.50<H>    Ca    9.8      2023 23:17  Mg     2.5         TPro  7.8  /  Alb  4.4  /  TBili  0.5  /  DBili  x   /  AST  70<H>  /  ALT  76<H>  /  AlkPhos  114  -      Urinalysis Basic - ( 2023 03:25 )    Color: Colorless / Appearance: Clear / S.008 / pH: x  Gluc: x / Ketone: Negative  / Bili: Negative / Urobili: Negative   Blood: x / Protein: Negative / Nitrite: Negative   Leuk Esterase: Negative / RBC: 4 /hpf / WBC 0 /HPF   Sq Epi: x / Non Sq Epi: 0 /hpf / Bacteria: Negative        RADIOLOGY & ADDITIONAL TESTS:  Reviewed

## 2023-02-04 NOTE — H&P ADULT - PROBLEM SELECTOR PLAN 3
- c/w home losartan, toprolol xl -- c/w home eliquis and toprolol xl -- c/w home eliquis and toprolol xl and amiodarone  - f/u TFT panel, mild trasaminitis likely 2/2 amiodarone

## 2023-02-04 NOTE — ED ADULT NURSE REASSESSMENT NOTE - NS ED NURSE REASSESS COMMENT FT1
Pt received in NAD, A&O x 4, breathing with ease on room air, IV access 20G to the L AC patent, noted swelling to the L leg, pt on tele monitoring, pt denies pain at this time, resting comfortably in bed, awaiting bed assignment.

## 2023-02-04 NOTE — H&P ADULT - NSHPREVIEWOFSYSTEMS_GEN_ALL_CORE
REVIEW OF SYSTEMS:  CONSTITUTIONAL:  No weakness, fevers or chills  EYES/ENT:  No visual changes;  No vertigo or throat pain   NECK:  No pain or stiffness, no sore throat   RESPIRATORY:  No cough, wheezing, hemoptysis; No shortness of breath  CARDIOVASCULAR:  No chest pain or palpitations  GASTROINTESTINAL:  No abdominal or epigastric pain. No nausea, vomiting, or hematemesis; No diarrhea or constipation. No melena or hematochezia.  GENITOURINARY:  No dysuria, frequency or hematuria  NEUROLOGICAL:  No numbness or weakness  SKIN:  No itching, rashes, no striae  PSYCH: No depressive thoughts, mood changes, or suicidal ideation REVIEW OF SYSTEMS:  CONSTITUTIONAL:  No weakness, fevers or chills  EYES/ENT:  No visual changes;  No vertigo or throat pain   NECK:  No pain or stiffness, no sore throat   RESPIRATORY:  No cough, wheezing, hemoptysis; No shortness of breath  CARDIOVASCULAR:  No chest pain or palpitations  GASTROINTESTINAL:  No abdominal or epigastric pain. No nausea, vomiting, or hematemesis; No diarrhea or constipation. No melena or hematochezia.  GENITOURINARY:  No dysuria, frequency or hematuria  NEUROLOGICAL:  No numbness or weakness  SKIN:  No itching, rashes, no striae  PSYCH: No depressive thoughts, mood changes, or suicidal ideation  ENDOCRINE: no polyuria or polydipsia, no recent changes in weight REVIEW OF SYSTEMS:  CONSTITUTIONAL:  No weakness, fevers or chills  EYES/ENT:  No visual changes;  No vertigo or throat pain   NECK:  No pain or stiffness, no sore throat, +dysphagia   RESPIRATORY:  No cough, wheezing, hemoptysis; No shortness of breath at rest, ,+exertional dyspnea   CARDIOVASCULAR:  No chest pain or palpitations  GASTROINTESTINAL:  No abdominal or epigastric pain. No nausea, vomiting, or hematemesis; No diarrhea or constipation. No melena or hematochezia.  GENITOURINARY:  No dysuria, frequency or hematuria  NEUROLOGICAL:  No numbness or weakness  SKIN:  No itching, rashes, no striae  PSYCH: No depressive thoughts, mood changes, or suicidal ideation  ENDOCRINE: no polyuria or polydipsia, no recent changes in weight

## 2023-02-04 NOTE — H&P ADULT - NSHPPHYSICALEXAM_GEN_ALL_CORE
PHYSICAL EXAM:    Constitutional: WDWN, NAD  HEENT: PERRL, EOMI, sclera non-icteric, neck supple, trachea midline, no masses, no JVD, MMM, good dentition  Respiratory: CTA b/l, good air entry b/l, no wheezing, no rhonchi, no rales, without accessory muscle use and no intercostal retractions  Cardiovascular: Irregularly irregular on auscultation,, no M/R/G  Gastrointestinal: soft, NTND, no masses palpable, BS normal  Extremities: Warm, well perfused, pulses equal bilateral upper and lower extremities, no edema, no clubbing. Capillary refill <2 sec  Neurological: AAOx3, CN Grossly intact  Skin: Normal temperature, warm, dry PHYSICAL EXAM:    Constitutional: WDWN, NAD  HEENT: PERRL, EOMI, sclera non-icteric, neck supple, trachea midline, no masses, no JVD, MMM, good dentition  Respiratory: CTA b/l, good air entry b/l, no wheezing, no rhonchi, no rales, without accessory muscle use and no intercostal retractions  Cardiovascular: Irregularly irregular on auscultation,, no M/R/G  Gastrointestinal: soft, NTND, no masses palpable, BS normal  Extremities:lower extremities cold to touch but palpable pulses b/l. LLE is red with slight bluish coloration. Capillary reflexes <2 seconds bilaterally. Sensation and ROM intact in the b/l lower extremities.   Neurological: AAOx3, CN Grossly intact  Skin: Normal temperature, warm, dry  Psych: normal mood and affect  Endocrine: no dorsocervical fat pad, no acanthosis nigricans, no abdominal striae Vital Signs Last 24 Hrs  T(C): 36.7 (04 Feb 2023 09:49), Max: 36.7 (04 Feb 2023 08:43)  T(F): 98.1 (04 Feb 2023 09:49), Max: 98.1 (04 Feb 2023 09:49)  HR: 86 (04 Feb 2023 09:49) (83 - 92)  BP: 167/90 (04 Feb 2023 09:49) (132/91 - 200/116)  BP(mean): --  RR: 18 (04 Feb 2023 09:49) (16 - 20)  SpO2: 95% (04 Feb 2023 09:49) (95% - 97%)    Parameters below as of 04 Feb 2023 09:49  Patient On (Oxygen Delivery Method): room air        PHYSICAL EXAM:    Constitutional: WDWN, NAD  HEENT: PERRL, EOMI, sclera non-icteric, neck supple, trachea midline, no masses, no JVD, MMM, good dentition  Respiratory: CTA b/l, good air entry b/l, no wheezing, no rhonchi, no rales, without accessory muscle use and no intercostal retractions  Cardiovascular: Irregularly irregular on auscultation,, no M/R/G  Gastrointestinal: soft, NTND, no masses palpable, BS normal  Extremities:lower extremities cold to touch but palpable pulses b/l. LLE is red with slight bluish coloration. Capillary reflexes <2 seconds bilaterally. Sensation and ROM intact in the b/l lower extremities.   Neurological: AAOx3, CN Grossly intact  Skin: Normal temperature, warm, dry  Psych: normal mood and affect  Endocrine: no dorsocervical fat pad, no acanthosis nigricans, no abdominal striae Vital Signs Last 24 Hrs  T(C): 36.7 (04 Feb 2023 09:49), Max: 36.7 (04 Feb 2023 08:43)  T(F): 98.1 (04 Feb 2023 09:49), Max: 98.1 (04 Feb 2023 09:49)  HR: 86 (04 Feb 2023 09:49) (83 - 92)  BP: 167/90 (04 Feb 2023 09:49) (132/91 - 200/116)  BP(mean): --  RR: 18 (04 Feb 2023 09:49) (16 - 20)  SpO2: 95% (04 Feb 2023 09:49) (95% - 97%)    Parameters below as of 04 Feb 2023 09:49  Patient On (Oxygen Delivery Method): room air        PHYSICAL EXAM:    Constitutional: WDWN, NAD  HEENT: PERRL, EOMI, sclera non-icteric, neck supple, trachea midline, no masses, no JVD, MMM  Respiratory: CTA b/l, good air entry b/l, no wheezing, no rhonchi, no rales, without accessory muscle use and no intercostal retractions  Cardiovascular: Irregularly irregular on auscultation,, no M/R/G  Gastrointestinal: soft, NTND, no masses palpable, BS normal  Extremities:lower extremities cold to touch but palpable pulses b/l. LLE is red with slight bluish coloration. Capillary reflexes <2 seconds bilaterally. Sensation and ROM intact in the b/l lower extremities.   Neurological: AAOx3, CN Grossly intact  Skin: Normal temperature, warm, dry  Psych: normal mood and affect  Endocrine: no dorsocervical fat pad, no acanthosis nigricans, no abdominal striae

## 2023-02-04 NOTE — H&P ADULT - PROBLEM SELECTOR PLAN 2
-- c/w home eliquis and toprolol xl - per history 1 month of L>R lower extremity swelling, coldness in the lower extremity however palpable pulses, Venous Duplex of b/l lower extremity demonstrating no signs of DVT  - vascular surgery consulted with no intervention, recommending outpatient follow-up  - likely chronic edema vs heart failure component, pending ECHO   - pending arterial duplex of the b/l lower extremities - per history 1 month of L>R lower extremity swelling, coldness in the lower extremity however palpable pulses, Venous Duplex of b/l lower extremity demonstrating no signs of DVT  - vascular surgery consulted with no intervention, recommending outpatient follow-up  - likely chronic edema vs heart failure component, pending ECHO

## 2023-02-05 ENCOUNTER — TRANSCRIPTION ENCOUNTER (OUTPATIENT)
Age: 88
End: 2023-02-05

## 2023-02-05 LAB
ALBUMIN SERPL ELPH-MCNC: 3.3 G/DL — SIGNIFICANT CHANGE UP (ref 3.3–5)
ALP SERPL-CCNC: 78 U/L — SIGNIFICANT CHANGE UP (ref 40–120)
ALT FLD-CCNC: 51 U/L — HIGH (ref 10–45)
ANION GAP SERPL CALC-SCNC: 13 MMOL/L — SIGNIFICANT CHANGE UP (ref 5–17)
ANION GAP SERPL CALC-SCNC: 13 MMOL/L — SIGNIFICANT CHANGE UP (ref 5–17)
AST SERPL-CCNC: 39 U/L — SIGNIFICANT CHANGE UP (ref 10–40)
BILIRUB SERPL-MCNC: 0.5 MG/DL — SIGNIFICANT CHANGE UP (ref 0.2–1.2)
BUN SERPL-MCNC: 33 MG/DL — HIGH (ref 7–23)
BUN SERPL-MCNC: 37 MG/DL — HIGH (ref 7–23)
CALCIUM SERPL-MCNC: 8.6 MG/DL — SIGNIFICANT CHANGE UP (ref 8.4–10.5)
CALCIUM SERPL-MCNC: 9.2 MG/DL — SIGNIFICANT CHANGE UP (ref 8.4–10.5)
CHLORIDE SERPL-SCNC: 96 MMOL/L — SIGNIFICANT CHANGE UP (ref 96–108)
CHLORIDE SERPL-SCNC: 98 MMOL/L — SIGNIFICANT CHANGE UP (ref 96–108)
CO2 SERPL-SCNC: 27 MMOL/L — SIGNIFICANT CHANGE UP (ref 22–31)
CO2 SERPL-SCNC: 29 MMOL/L — SIGNIFICANT CHANGE UP (ref 22–31)
CREAT ?TM UR-MCNC: 46 MG/DL — SIGNIFICANT CHANGE UP
CREAT SERPL-MCNC: 1.26 MG/DL — SIGNIFICANT CHANGE UP (ref 0.5–1.3)
CREAT SERPL-MCNC: 1.8 MG/DL — HIGH (ref 0.5–1.3)
EGFR: 27 ML/MIN/1.73M2 — LOW
EGFR: 41 ML/MIN/1.73M2 — LOW
GLUCOSE SERPL-MCNC: 124 MG/DL — HIGH (ref 70–99)
GLUCOSE SERPL-MCNC: 92 MG/DL — SIGNIFICANT CHANGE UP (ref 70–99)
HCT VFR BLD CALC: 45.3 % — HIGH (ref 34.5–45)
HGB BLD-MCNC: 14.8 G/DL — SIGNIFICANT CHANGE UP (ref 11.5–15.5)
MAGNESIUM SERPL-MCNC: 1.9 MG/DL — SIGNIFICANT CHANGE UP (ref 1.6–2.6)
MAGNESIUM SERPL-MCNC: 2 MG/DL — SIGNIFICANT CHANGE UP (ref 1.6–2.6)
MCHC RBC-ENTMCNC: 31 PG — SIGNIFICANT CHANGE UP (ref 27–34)
MCHC RBC-ENTMCNC: 32.7 GM/DL — SIGNIFICANT CHANGE UP (ref 32–36)
MCV RBC AUTO: 94.8 FL — SIGNIFICANT CHANGE UP (ref 80–100)
NRBC # BLD: 0 /100 WBCS — SIGNIFICANT CHANGE UP (ref 0–0)
PHOSPHATE SERPL-MCNC: 3.3 MG/DL — SIGNIFICANT CHANGE UP (ref 2.5–4.5)
PHOSPHATE SERPL-MCNC: 5.4 MG/DL — HIGH (ref 2.5–4.5)
PLATELET # BLD AUTO: 182 K/UL — SIGNIFICANT CHANGE UP (ref 150–400)
POTASSIUM SERPL-MCNC: 2.6 MMOL/L — CRITICAL LOW (ref 3.5–5.3)
POTASSIUM SERPL-MCNC: 4.8 MMOL/L — SIGNIFICANT CHANGE UP (ref 3.5–5.3)
POTASSIUM SERPL-SCNC: 2.6 MMOL/L — CRITICAL LOW (ref 3.5–5.3)
POTASSIUM SERPL-SCNC: 4.8 MMOL/L — SIGNIFICANT CHANGE UP (ref 3.5–5.3)
PROT SERPL-MCNC: 6 G/DL — SIGNIFICANT CHANGE UP (ref 6–8.3)
RBC # BLD: 4.78 M/UL — SIGNIFICANT CHANGE UP (ref 3.8–5.2)
RBC # FLD: 14.4 % — SIGNIFICANT CHANGE UP (ref 10.3–14.5)
SODIUM SERPL-SCNC: 136 MMOL/L — SIGNIFICANT CHANGE UP (ref 135–145)
SODIUM SERPL-SCNC: 140 MMOL/L — SIGNIFICANT CHANGE UP (ref 135–145)
SODIUM UR-SCNC: 83 MMOL/L — SIGNIFICANT CHANGE UP
T4 FREE SERPL-MCNC: 1.3 NG/DL — SIGNIFICANT CHANGE UP (ref 0.9–1.8)
TSH SERPL-MCNC: 10.2 UIU/ML — HIGH (ref 0.27–4.2)
UUN UR-MCNC: 666 MG/DL — SIGNIFICANT CHANGE UP
WBC # BLD: 5.73 K/UL — SIGNIFICANT CHANGE UP (ref 3.8–10.5)
WBC # FLD AUTO: 5.73 K/UL — SIGNIFICANT CHANGE UP (ref 3.8–10.5)

## 2023-02-05 PROCEDURE — 93306 TTE W/DOPPLER COMPLETE: CPT | Mod: 26

## 2023-02-05 PROCEDURE — 99232 SBSQ HOSP IP/OBS MODERATE 35: CPT

## 2023-02-05 RX ORDER — POTASSIUM CHLORIDE 20 MEQ
10 PACKET (EA) ORAL
Refills: 0 | Status: COMPLETED | OUTPATIENT
Start: 2023-02-05 | End: 2023-02-05

## 2023-02-05 RX ORDER — POTASSIUM CHLORIDE 20 MEQ
40 PACKET (EA) ORAL EVERY 4 HOURS
Refills: 0 | Status: COMPLETED | OUTPATIENT
Start: 2023-02-05 | End: 2023-02-05

## 2023-02-05 RX ORDER — CHLORHEXIDINE GLUCONATE 213 G/1000ML
1 SOLUTION TOPICAL DAILY
Refills: 0 | Status: DISCONTINUED | OUTPATIENT
Start: 2023-02-05 | End: 2023-02-06

## 2023-02-05 RX ORDER — POTASSIUM CHLORIDE 20 MEQ
40 PACKET (EA) ORAL ONCE
Refills: 0 | Status: COMPLETED | OUTPATIENT
Start: 2023-02-05 | End: 2023-02-05

## 2023-02-05 RX ADMIN — FAMOTIDINE 20 MILLIGRAM(S): 10 INJECTION INTRAVENOUS at 11:21

## 2023-02-05 RX ADMIN — SIMVASTATIN 20 MILLIGRAM(S): 20 TABLET, FILM COATED ORAL at 21:14

## 2023-02-05 RX ADMIN — Medication 25 MILLIGRAM(S): at 05:19

## 2023-02-05 RX ADMIN — Medication 40 MILLIEQUIVALENT(S): at 13:52

## 2023-02-05 RX ADMIN — Medication 63.75 MILLIMOLE(S): at 13:50

## 2023-02-05 RX ADMIN — Medication 40 MILLIEQUIVALENT(S): at 10:44

## 2023-02-05 RX ADMIN — APIXABAN 2.5 MILLIGRAM(S): 2.5 TABLET, FILM COATED ORAL at 17:13

## 2023-02-05 RX ADMIN — Medication 40 MILLIEQUIVALENT(S): at 08:45

## 2023-02-05 RX ADMIN — Medication 0.5 MILLIGRAM(S): at 05:18

## 2023-02-05 RX ADMIN — Medication 100 MILLIEQUIVALENT(S): at 08:49

## 2023-02-05 RX ADMIN — CHLORHEXIDINE GLUCONATE 1 APPLICATION(S): 213 SOLUTION TOPICAL at 11:23

## 2023-02-05 RX ADMIN — AMIODARONE HYDROCHLORIDE 200 MILLIGRAM(S): 400 TABLET ORAL at 05:19

## 2023-02-05 RX ADMIN — Medication 100 MILLIEQUIVALENT(S): at 10:16

## 2023-02-05 RX ADMIN — Medication 0.5 MILLIGRAM(S): at 17:13

## 2023-02-05 RX ADMIN — APIXABAN 2.5 MILLIGRAM(S): 2.5 TABLET, FILM COATED ORAL at 05:19

## 2023-02-05 RX ADMIN — Medication 100 MILLIEQUIVALENT(S): at 11:24

## 2023-02-05 NOTE — PROGRESS NOTE ADULT - PROBLEM SELECTOR PLAN 1
elevated Cr of 1.5 (baseline Cr  of 0.6-0.7)   - UA grossly negative  - avoid NSAIDs, ACE/ARBs, nephrotoxic drugs, and contrast  - daily weight, strict I/O  - consider urine lytes, s/p Lasix 40 mg IV, follow-up TTE  - monitor Cr elevated Cr of 1.8 (baseline Cr  of 0.6-0.7)   - UA grossly negative  - avoid NSAIDs, ACE/ARBs, nephrotoxic drugs, and contrast  - daily weight, strict I/O  - consider urine lytes, follow-up FEUrea,  s/p Lasix 40 mg IV, follow-up TTE  - monitor Cr

## 2023-02-05 NOTE — PROGRESS NOTE ADULT - PROBLEM SELECTOR PLAN 3
-- c/w home eliquis and toprolol xl and amiodarone  - f/u TFT panel, mild trasaminitis likely 2/2 amiodarone -- c/w home eliquis and toprolol xl and amiodarone  - elevated TSH of 10 however thyroxine wnl,  mild trasaminitis downtrending likely 2/2 amiodarone - c/w home eliquis and toprolol xl and amiodarone  - TSH elevated, but normal Ft4 - subclinical hypothyroidism vs age related vs amiodarone related   - outpt f/u with PMD for repeat labs

## 2023-02-05 NOTE — PROGRESS NOTE ADULT - ASSESSMENT
88 Year old woman w/ PMH hypertension, hyperlipidemia, A.fib on Eliquis and amiodarone comes to ED w/ left leg swelling and pain L>R, sent in by PMD to the ED due to concern of PAD vs DVT, on presentation DVT duplex wnl, BNP>1900, s/p Lasix 40 mg IV x 1, presentation likely 2/2 HF.  Vascular surgery consulted with no intervention recommended. Patient also with IRA  88 Year old woman w/ PMH hypertension, hyperlipidemia, A.fib on Eliquis and amiodarone comes to ED w/ left leg swelling and pain L>R, sent in by PMD to the ED due to concern of PAD vs DVT, on presentation DVT duplex wnl, Vascular surgery consulted recommending no intervention,  BNP>1900, s/p Lasix 40 mg IV x 1, presentation likely 2/2 venous stasis with slight component of CHF vs fluid accumulation 2/2 IRA. Pending ECHO and being monitored for IRA.

## 2023-02-05 NOTE — PROGRESS NOTE ADULT - PROBLEM SELECTOR PLAN 2
- per history 1 month of L>R lower extremity swelling, coldness in the lower extremity however palpable pulses, Venous Duplex of b/l lower extremity demonstrating no signs of DVT  - vascular surgery consulted with no intervention, recommending outpatient follow-up  - likely chronic edema vs heart failure component, pending ECHO - per history 1 month of L>R lower extremity swelling, coldness in the lower extremity however palpable pulses, Venous Duplex of b/l lower extremity demonstrating no signs of DVT  - vascular surgery consulted with no intervention, recommending outpatient follow-up  - patient appears euvolemic on exam   - likely chronic edema vs heart failure component, pending ECHO  - venous compression stockings, elevate legs

## 2023-02-05 NOTE — PHYSICAL THERAPY INITIAL EVALUATION ADULT - PERTINENT HX OF CURRENT PROBLEM, REHAB EVAL
88 Year old woman w/ PMH hypertension, hyperlipidemia, A. fib on Eliquis and amiodarone comes to ED w/ left leg/foot swelling.  Walks with cane at baseline with no changes to functional status as of recently. Denies chest pain, no foot wounds.  No claudication symptoms.  Pain is mild and in foot. Patient reports worsening symptoms over the past month. No fevers or chills, otherwise feels well. History of PAD per outpatient records but patient had not seen vascular surgeon. . Vascular surgery consulted in the ED recommending Venous duplex DVT study and to follow-up outpatient.  Of note patient also endorsing exertional dyspnea as well as dysphagia. Hosp course: 2/3: VA duplex: No evidence of deep venous thrombosis in either lower extremity. 2/4: CXR: Moderate right and small left pleural effusion.

## 2023-02-05 NOTE — PATIENT PROFILE ADULT - SURGICAL SITE INCISION
Performing Laboratory: -[id number] Lab Facility: [id number] Billing Type: [employer] Expected Date Of Service: 01/10/2020 Bill For Surgical Tray: no no

## 2023-02-05 NOTE — PATIENT PROFILE ADULT - FUNCTIONAL ASSESSMENT - BASIC MOBILITY 2.
PRE-OP DIAGNOSIS:  Osteoarthritis of right knee 24-Jul-2020 14:10:52  Josh Burnett 3 = A little assistance

## 2023-02-05 NOTE — PHYSICAL THERAPY INITIAL EVALUATION ADULT - ADDITIONAL COMMENTS
Pt lives in a private home with 5 steps to enter and b/l handrail, 12 steps with handrail on L side ascending to get to bedroom and 12 steps with handrail on R side ascending to get to basement. Pt owns straight point cane and RW.

## 2023-02-05 NOTE — PROGRESS NOTE ADULT - SUBJECTIVE AND OBJECTIVE BOX
**************************************  Donnie Ronan, PGY-1  **************************************    INTERVAL HPI/OVERNIGHT EVENTS:  Patient was seen and examined at bedside. As per nurse and patient, no o/n events, patient resting comfortably. No complaints at this time. Patient denies: fever, chills, dizziness, weakness, HA, Changes in vision, CP, palpitations, SOB, cough, N/V/D/C, dysuria, changes in bowel movements, LE edema. ROS otherwise negative.    VITAL SIGNS:  T(F): 98.3 (23 @ 04:51)  HR: 82 (23 @ 04:51)  BP: 167/97 (23 @ 04:51)  RR: 18 (23 @ 04:51)  SpO2: 93% (23 @ 04:51)  Wt(kg): --      PHYSICAL EXAM:    Constitutional: WDWN, NAD  HEENT: PERRL, EOMI, sclera non-icteric, neck supple, trachea midline, no masses, no JVD, MMM  Respiratory: CTA b/l, good air entry b/l, no wheezing, no rhonchi, no rales, without accessory muscle use and no intercostal retractions  Cardiovascular: Irregularly irregular on auscultation,, no M/R/G  Gastrointestinal: soft, NTND, no masses palpable, BS normal  Extremities:lower extremities cold to touch but palpable pulses b/l. LLE is red with slight bluish coloration. Capillary reflexes <2 seconds bilaterally. Sensation and ROM intact in the b/l lower extremities.   Neurological: AAOx3, CN Grossly intact  Skin: Normal temperature, warm, dry        MEDICATIONS  (STANDING):  aMIOdarone    Tablet 200 milliGRAM(s) Oral daily  apixaban 2.5 milliGRAM(s) Oral two times a day  clonazePAM  Tablet 0.5 milliGRAM(s) Oral two times a day  famotidine    Tablet 20 milliGRAM(s) Oral daily  metoprolol succinate ER 25 milliGRAM(s) Oral daily  polyethylene glycol 3350 17 Gram(s) Oral daily  simvastatin 20 milliGRAM(s) Oral at bedtime    MEDICATIONS  (PRN):  acetaminophen     Tablet .. 650 milliGRAM(s) Oral every 6 hours PRN Temp greater or equal to 38C (100.4F), Mild Pain (1 - 3)  melatonin 3 milliGRAM(s) Oral at bedtime PRN Insomnia      Allergies    Lipitor (Eye Irritation)    Intolerances        LABS:                        14.8   5.73  )-----------( 182      ( 2023 07:21 )             45.3     02-03    142  |  99  |  41<H>  ----------------------------<  125<H>  3.2<L>   |  24  |  1.50<H>    Ca    9.8      2023 23:17  Mg     2.5     02-03    TPro  7.8  /  Alb  4.4  /  TBili  0.5  /  DBili  x   /  AST  70<H>  /  ALT  76<H>  /  AlkPhos  114  02-03      Urinalysis Basic - ( 2023 03:25 )    Color: Colorless / Appearance: Clear / S.008 / pH: x  Gluc: x / Ketone: Negative  / Bili: Negative / Urobili: Negative   Blood: x / Protein: Negative / Nitrite: Negative   Leuk Esterase: Negative / RBC: 4 /hpf / WBC 0 /HPF   Sq Epi: x / Non Sq Epi: 0 /hpf / Bacteria: Negative        RADIOLOGY & ADDITIONAL TESTS:  Reviewed

## 2023-02-05 NOTE — PATIENT PROFILE ADULT - FALL HARM RISK - HARM RISK INTERVENTIONS
Assistance with ambulation/Assistance OOB with selected safe patient handling equipment/Communicate Risk of Fall with Harm to all staff/Discuss with provider need for PT consult/Monitor gait and stability/Provide patient with walking aids - walker, cane, crutches/Reinforce activity limits and safety measures with patient and family/Review medications for side effects contributing to fall risk/Tailored Fall Risk Interventions/Use of alarms - bed, chair and/or voice tab/Visual Cue: Yellow wristband and red socks/Bed in lowest position, wheels locked, appropriate side rails in place/Call bell, personal items and telephone in reach/Instruct patient to call for assistance before getting out of bed or chair/Non-slip footwear when patient is out of bed/[place] to call system/Physically safe environment - no spills, clutter or unnecessary equipment/Purposeful Proactive Rounding/Room/bathroom lighting operational, light cord in reach

## 2023-02-06 ENCOUNTER — TRANSCRIPTION ENCOUNTER (OUTPATIENT)
Age: 88
End: 2023-02-06

## 2023-02-06 VITALS
OXYGEN SATURATION: 97 % | RESPIRATION RATE: 18 BRPM | TEMPERATURE: 97 F | SYSTOLIC BLOOD PRESSURE: 156 MMHG | HEART RATE: 82 BPM | DIASTOLIC BLOOD PRESSURE: 95 MMHG

## 2023-02-06 DIAGNOSIS — I51.9 HEART DISEASE, UNSPECIFIED: ICD-10-CM

## 2023-02-06 PROBLEM — E78.5 HYPERLIPIDEMIA, UNSPECIFIED: Chronic | Status: ACTIVE | Noted: 2023-02-04

## 2023-02-06 PROBLEM — I10 ESSENTIAL (PRIMARY) HYPERTENSION: Chronic | Status: ACTIVE | Noted: 2023-02-04

## 2023-02-06 PROBLEM — I48.91 UNSPECIFIED ATRIAL FIBRILLATION: Chronic | Status: ACTIVE | Noted: 2023-02-04

## 2023-02-06 LAB
ALBUMIN SERPL ELPH-MCNC: 3.4 G/DL — SIGNIFICANT CHANGE UP (ref 3.3–5)
ALP SERPL-CCNC: 76 U/L — SIGNIFICANT CHANGE UP (ref 40–120)
ALT FLD-CCNC: 53 U/L — HIGH (ref 10–45)
ANION GAP SERPL CALC-SCNC: 10 MMOL/L — SIGNIFICANT CHANGE UP (ref 5–17)
AST SERPL-CCNC: 45 U/L — HIGH (ref 10–40)
BASOPHILS # BLD AUTO: 0.04 K/UL — SIGNIFICANT CHANGE UP (ref 0–0.2)
BASOPHILS NFR BLD AUTO: 0.7 % — SIGNIFICANT CHANGE UP (ref 0–2)
BILIRUB SERPL-MCNC: 0.5 MG/DL — SIGNIFICANT CHANGE UP (ref 0.2–1.2)
BUN SERPL-MCNC: 34 MG/DL — HIGH (ref 7–23)
CALCIUM SERPL-MCNC: 9 MG/DL — SIGNIFICANT CHANGE UP (ref 8.4–10.5)
CHLORIDE SERPL-SCNC: 102 MMOL/L — SIGNIFICANT CHANGE UP (ref 96–108)
CO2 SERPL-SCNC: 26 MMOL/L — SIGNIFICANT CHANGE UP (ref 22–31)
CREAT SERPL-MCNC: 1.53 MG/DL — HIGH (ref 0.5–1.3)
EGFR: 33 ML/MIN/1.73M2 — LOW
EOSINOPHIL # BLD AUTO: 0.24 K/UL — SIGNIFICANT CHANGE UP (ref 0–0.5)
EOSINOPHIL NFR BLD AUTO: 4.2 % — SIGNIFICANT CHANGE UP (ref 0–6)
GLUCOSE SERPL-MCNC: 103 MG/DL — HIGH (ref 70–99)
HCT VFR BLD CALC: 45.5 % — HIGH (ref 34.5–45)
HGB BLD-MCNC: 14.7 G/DL — SIGNIFICANT CHANGE UP (ref 11.5–15.5)
IMM GRANULOCYTES NFR BLD AUTO: 0.4 % — SIGNIFICANT CHANGE UP (ref 0–0.9)
LYMPHOCYTES # BLD AUTO: 1.47 K/UL — SIGNIFICANT CHANGE UP (ref 1–3.3)
LYMPHOCYTES # BLD AUTO: 25.8 % — SIGNIFICANT CHANGE UP (ref 13–44)
MAGNESIUM SERPL-MCNC: 2 MG/DL — SIGNIFICANT CHANGE UP (ref 1.6–2.6)
MCHC RBC-ENTMCNC: 30.6 PG — SIGNIFICANT CHANGE UP (ref 27–34)
MCHC RBC-ENTMCNC: 32.3 GM/DL — SIGNIFICANT CHANGE UP (ref 32–36)
MCV RBC AUTO: 94.6 FL — SIGNIFICANT CHANGE UP (ref 80–100)
MONOCYTES # BLD AUTO: 0.52 K/UL — SIGNIFICANT CHANGE UP (ref 0–0.9)
MONOCYTES NFR BLD AUTO: 9.1 % — SIGNIFICANT CHANGE UP (ref 2–14)
MRSA PCR RESULT.: SIGNIFICANT CHANGE UP
NEUTROPHILS # BLD AUTO: 3.41 K/UL — SIGNIFICANT CHANGE UP (ref 1.8–7.4)
NEUTROPHILS NFR BLD AUTO: 59.8 % — SIGNIFICANT CHANGE UP (ref 43–77)
NRBC # BLD: 0 /100 WBCS — SIGNIFICANT CHANGE UP (ref 0–0)
PHOSPHATE SERPL-MCNC: 3.5 MG/DL — SIGNIFICANT CHANGE UP (ref 2.5–4.5)
PLATELET # BLD AUTO: 211 K/UL — SIGNIFICANT CHANGE UP (ref 150–400)
POTASSIUM SERPL-MCNC: 4.9 MMOL/L — SIGNIFICANT CHANGE UP (ref 3.5–5.3)
POTASSIUM SERPL-SCNC: 4.9 MMOL/L — SIGNIFICANT CHANGE UP (ref 3.5–5.3)
PROT SERPL-MCNC: 6.3 G/DL — SIGNIFICANT CHANGE UP (ref 6–8.3)
RBC # BLD: 4.81 M/UL — SIGNIFICANT CHANGE UP (ref 3.8–5.2)
RBC # FLD: 14.4 % — SIGNIFICANT CHANGE UP (ref 10.3–14.5)
S AUREUS DNA NOSE QL NAA+PROBE: SIGNIFICANT CHANGE UP
SODIUM SERPL-SCNC: 138 MMOL/L — SIGNIFICANT CHANGE UP (ref 135–145)
WBC # BLD: 5.7 K/UL — SIGNIFICANT CHANGE UP (ref 3.8–10.5)
WBC # FLD AUTO: 5.7 K/UL — SIGNIFICANT CHANGE UP (ref 3.8–10.5)

## 2023-02-06 PROCEDURE — 92610 EVALUATE SWALLOWING FUNCTION: CPT

## 2023-02-06 PROCEDURE — 99239 HOSP IP/OBS DSCHRG MGMT >30: CPT | Mod: GC

## 2023-02-06 PROCEDURE — 36415 COLL VENOUS BLD VENIPUNCTURE: CPT

## 2023-02-06 PROCEDURE — 82947 ASSAY GLUCOSE BLOOD QUANT: CPT

## 2023-02-06 PROCEDURE — 83880 ASSAY OF NATRIURETIC PEPTIDE: CPT

## 2023-02-06 PROCEDURE — 97161 PT EVAL LOW COMPLEX 20 MIN: CPT

## 2023-02-06 PROCEDURE — 84540 ASSAY OF URINE/UREA-N: CPT

## 2023-02-06 PROCEDURE — 83735 ASSAY OF MAGNESIUM: CPT

## 2023-02-06 PROCEDURE — 85027 COMPLETE CBC AUTOMATED: CPT

## 2023-02-06 PROCEDURE — 84443 ASSAY THYROID STIM HORMONE: CPT

## 2023-02-06 PROCEDURE — 85025 COMPLETE CBC W/AUTO DIFF WBC: CPT

## 2023-02-06 PROCEDURE — 87637 SARSCOV2&INF A&B&RSV AMP PRB: CPT

## 2023-02-06 PROCEDURE — 84300 ASSAY OF URINE SODIUM: CPT

## 2023-02-06 PROCEDURE — 85018 HEMOGLOBIN: CPT

## 2023-02-06 PROCEDURE — 83605 ASSAY OF LACTIC ACID: CPT

## 2023-02-06 PROCEDURE — 87640 STAPH A DNA AMP PROBE: CPT

## 2023-02-06 PROCEDURE — 84132 ASSAY OF SERUM POTASSIUM: CPT

## 2023-02-06 PROCEDURE — 71045 X-RAY EXAM CHEST 1 VIEW: CPT

## 2023-02-06 PROCEDURE — 82803 BLOOD GASES ANY COMBINATION: CPT

## 2023-02-06 PROCEDURE — 82565 ASSAY OF CREATININE: CPT

## 2023-02-06 PROCEDURE — 99285 EMERGENCY DEPT VISIT HI MDM: CPT

## 2023-02-06 PROCEDURE — 82570 ASSAY OF URINE CREATININE: CPT

## 2023-02-06 PROCEDURE — 84439 ASSAY OF FREE THYROXINE: CPT

## 2023-02-06 PROCEDURE — 84484 ASSAY OF TROPONIN QUANT: CPT

## 2023-02-06 PROCEDURE — 93970 EXTREMITY STUDY: CPT

## 2023-02-06 PROCEDURE — 80053 COMPREHEN METABOLIC PANEL: CPT

## 2023-02-06 PROCEDURE — 84100 ASSAY OF PHOSPHORUS: CPT

## 2023-02-06 PROCEDURE — 85014 HEMATOCRIT: CPT

## 2023-02-06 PROCEDURE — 81001 URINALYSIS AUTO W/SCOPE: CPT

## 2023-02-06 PROCEDURE — 82330 ASSAY OF CALCIUM: CPT

## 2023-02-06 PROCEDURE — 84295 ASSAY OF SERUM SODIUM: CPT

## 2023-02-06 PROCEDURE — 93306 TTE W/DOPPLER COMPLETE: CPT

## 2023-02-06 PROCEDURE — 87641 MR-STAPH DNA AMP PROBE: CPT

## 2023-02-06 PROCEDURE — 80048 BASIC METABOLIC PNL TOTAL CA: CPT

## 2023-02-06 PROCEDURE — 82435 ASSAY OF BLOOD CHLORIDE: CPT

## 2023-02-06 RX ORDER — LOSARTAN POTASSIUM 100 MG/1
1 TABLET, FILM COATED ORAL
Qty: 0 | Refills: 0 | DISCHARGE

## 2023-02-06 RX ORDER — FUROSEMIDE 40 MG
1 TABLET ORAL
Qty: 7 | Refills: 0
Start: 2023-02-06 | End: 2023-02-12

## 2023-02-06 RX ADMIN — Medication 0.5 MILLIGRAM(S): at 17:16

## 2023-02-06 RX ADMIN — POLYETHYLENE GLYCOL 3350 17 GRAM(S): 17 POWDER, FOR SOLUTION ORAL at 11:17

## 2023-02-06 RX ADMIN — Medication 0.5 MILLIGRAM(S): at 05:24

## 2023-02-06 RX ADMIN — Medication 25 MILLIGRAM(S): at 05:23

## 2023-02-06 RX ADMIN — AMIODARONE HYDROCHLORIDE 200 MILLIGRAM(S): 400 TABLET ORAL at 05:24

## 2023-02-06 RX ADMIN — APIXABAN 2.5 MILLIGRAM(S): 2.5 TABLET, FILM COATED ORAL at 17:16

## 2023-02-06 RX ADMIN — FAMOTIDINE 20 MILLIGRAM(S): 10 INJECTION INTRAVENOUS at 11:17

## 2023-02-06 RX ADMIN — CHLORHEXIDINE GLUCONATE 1 APPLICATION(S): 213 SOLUTION TOPICAL at 11:18

## 2023-02-06 RX ADMIN — APIXABAN 2.5 MILLIGRAM(S): 2.5 TABLET, FILM COATED ORAL at 05:23

## 2023-02-06 NOTE — SWALLOW BEDSIDE ASSESSMENT ADULT - SLP GENERAL OBSERVATIONS
Pt awake OOB in chair, daughter present at bedside. Pt able to communicate needs and follow directions for exam. Pleasant and cooperative. Clear dry vocal quality. Daughter intermittently translated into Korean.

## 2023-02-06 NOTE — SWALLOW BEDSIDE ASSESSMENT ADULT - ADDITIONAL RECOMMENDATIONS
Maintain good oral hygiene   Service will continue to follow and monitor tolerance of recommended diet

## 2023-02-06 NOTE — DISCHARGE NOTE PROVIDER - CARE PROVIDER_API CALL
Denise Katz (DO)  Internal Medicine  [facility], [address] [place] [address]  Phone: [phone]  Fax: [phone]  Established Patient  Follow Up Time: 2 weeks

## 2023-02-06 NOTE — SWALLOW BEDSIDE ASSESSMENT ADULT - H & P REVIEW
HPI: 88 Year old woman w/ PMH hypertension, hyperlipidemia, A. fib on Eliquis and amiodarone comes to ED w/ left leg/foot swelling.  Walks with cane at baseline with no changes to functional status as of recently. Denies chest pain, no foot wounds.  No claudication symptoms.  Pain is mild and in foot. Patient reports worsening symptoms over the past month. No fevers or chills, otherwise feels well. History of PAD per outpatient records but patient had not seen vascular surgeon. . Vascular surgery consulted in the ED recommending Venous duplex DVT study and to follow-up outpatient.  Of note patient also endorsing exertional dyspnea as well as dysphagia.  In the ED, patient afebrile, BP: (130-180)/(90-100s) HR 83-89, maintaining adequate O2 saturations on RA.  given Lasix 40 mg IV x 1, PO potassium 40 meq PO x 1  CXR: Moderate right and small left pleural effusion. Venous Duplex of the bilateral lower extremity: No DVT. On admit pt AAOx3, CN Grossly intact. S&S consult given possible dysphagia, but not a barrier to discharge & can do a regular diet for now/yes

## 2023-02-06 NOTE — DISCHARGE NOTE PROVIDER - NSDCCPTREATMENT_GEN_ALL_CORE_FT
PRINCIPAL PROCEDURE  Procedure: Transthoracic echo  Findings and Treatment:       SECONDARY PROCEDURE  Procedure: Venous duplex bilateral lower  Findings and Treatment:

## 2023-02-06 NOTE — SWALLOW BEDSIDE ASSESSMENT ADULT - SWALLOW EVAL: PATIENT/FAMILY GOALS STATEMENT
Daughter present at bedside, denied history of recurrent PNA or hospitalizations. Pt has not been previously evaluated by SLP for swallowing. Pt and daughter reported episodes of JOHNSON and pt reported episodes of SOB with PO intake only in the morning however denied chronic choking or coughing with PO intake. Reported good tolerance of regular texture diet.

## 2023-02-06 NOTE — SWALLOW BEDSIDE ASSESSMENT ADULT - ASR SWALLOW ASPIRATION MONITOR
Monitor for s/s aspiration/laryngeal penetration. If noted:  D/C p.o. intake, provide non-oral nutrition/hydration/meds, and contact this service @ [phone]/change of breathing pattern/cough/gurgly voice/fever/pneumonia/throat clearing/upper respiratory infection

## 2023-02-06 NOTE — PROGRESS NOTE ADULT - ASSESSMENT
88 Year old woman w/ PMH hypertension, hyperlipidemia, A.fib on Eliquis and amiodarone comes to ED w/ left leg swelling and pain L>R, sent in by PMD to the ED due to concern of PAD vs DVT, on presentation DVT duplex wnl, Vascular surgery consulted recommending no intervention,  BNP>1900, s/p Lasix 40 mg IV x 1, presentation likely 2/2 venous stasis with slight component of CHF vs fluid accumulation 2/2 IRA. Pending ECHO and being monitored for IRA.  88 Year old woman w/ PMH hypertension, hyperlipidemia, A.fib on Eliquis and amiodarone comes to ED w/ left leg swelling and pain L>R, sent in by PMD to the ED due to concern of PAD vs DVT, on presentation DVT duplex wnl, Vascular surgery consulted recommending no intervention,  BNP>1900, s/p Lasix 40 mg IV x 1, presentation likely 2/2 venous stasis with slight component of CHF vs fluid accumulation 2/2 IRA. IRA improving. Echo showing RV dysfunction with moderate pulmonary HTN. Pt has an appt scheduled with Dr. Munoz this Thursday 2/9 at 1:15PM and would like to go home. Is ambulating without desat and has improved symptoms. Pending DC

## 2023-02-06 NOTE — PROGRESS NOTE ADULT - ATTENDING COMMENTS
88 Year old woman w/ PMH hypertension, hyperlipidemia, A.fib on Eliquis and amiodarone comes to ED w/ left leg swelling and pain L>R, sent in by PMD to the ED due to concern of PAD vs DVT, on presentation DVT duplex wnl, BNP>1900, s/p Lasix 40 mg IV x 1, presentation likely 2/2 HF vs venous stasis. Vascular surgery consulted with no intervention recommended. Patient also with IRA.   - pt appears euvolemic at this time, will defer further lasix right now, can c/w lasix prn  - f/u TTE to assess for HF, valvular disease  - IRA improving, will c/t hold ARB for now -  reassess tomorrow and resume as tolerated   - significant hypoK 2.6 today - will replete with IV and PO and recheck BMP this evening   - TSH elevated, but normal Ft4 - subclinical hypothyroidism vs age related vs amiodarone related - outpt f/u with PMD for repeat labs   - PT eval pending      d/w HS T1
Patient seen and evaluated with daughter at [facility]. Patient states still gets winded when walking, howcer walked around the unit with RN with walker, did not desaturate below 94% on rA. Patient also was able to ambulate up 7 stairs with PT indedepdently yesterday.   Discussed TTE findings and likely symptoms are in setting of right sided heart disease in addition to venous stasis.     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  -vascular saw patient, does not think PAD and cleared for discharge.  -patient has close follow up with Dr Munoz, appointment made by team for follow up on Thursday, where best diagnostic course of action can be discussse.d  -will restart po lasix 40mg upon d/c with creatinine improving, hold off ARB.  -compression stockings ordered      #Elevated TSH  -with normal fT4  -patient to follow up with outpaitent re: strating synthroid.  -paitent also may be stopping amiodarone, which may help thyroid levels, to be determined as outpatient.       patient is stable for discharge with close cardiology follow up. D/c planning 38 minutes.

## 2023-02-06 NOTE — HISTORY OF PRESENT ILLNESS
[de-identified] : Patient is a 88 year old female with history of HTN, HLD, anxiety, presents for follow-up \par \par She feels okay \par Denies any CP, HA, N/V or abdominal pain + Dyspnea on exertion \par She reports compliance with medications but has noticed some lower extremity swelling over the last month

## 2023-02-06 NOTE — DISCHARGE NOTE PROVIDER - NSDCFUADDAPPT_GEN_ALL_CORE_FT
APPTS ARE READY TO BE MADE: [X] YES    Best Family or Patient Contact (if needed):    Additional Information about above appointments (if needed):    1: Please follow-up with primary care physician within 1-2 weeks of discharge.   2: Please follow-up with the vascular surgeon within 1 month of discharge.   3: Please follow-up with the cardiologist within 1 month of discharge.     Other comments or requests:    APPTS ARE READY TO BE MADE: [X] YES    Best Family or Patient Contact (if needed):    Additional Information about above appointments (if needed):    1: Please follow-up with primary care physician within 1-2 weeks of discharge, as you will need repeat thyroid testing and discussion of levothyroxine initiation.   2: Please follow-up with the vascular surgeon within 1 month of discharge.   3: Please follow-up with the cardiologist within 1 month of discharge.     Other comments or requests:    APPTS ARE READY TO BE MADE: [X] YES    Best Family or Patient Contact (if needed):    Additional Information about above appointments (if needed):    1: Please follow-up with primary care physician within 1-2 weeks of discharge, as you will need repeat thyroid testing and discussion of levothyroxine initiation.   2: Please follow-up with the vascular surgeon within 1 month of discharge.   3: Please follow-up with the cardiologist within 1 month of discharge.     Other comments or requests:   Patient was previously scheduled with Denise Benjamin on 02/22 12:45p at [facility], [phone] [place] [address] [place] [address]

## 2023-02-06 NOTE — DISCHARGE NOTE PROVIDER - NSDCFUSCHEDAPPT_GEN_ALL_CORE_FT
Dion Tinoco  [place] [facility]  CARDIOLOGY [address]  Scheduled Appointment: 03/06/2023    Arcadio Campbell  [facility]  ENDOCRIN [address]  Scheduled Appointment: 04/19/2023     Sidra Munoz  [facility]  CARDIOLOGY [address]  Scheduled Appointment: 02/09/2023    Dion Tinoco  [facility]  CARDIOLOGY [address]  Scheduled Appointment: 03/06/2023    Arcadio Campbell  [facility]  ENDOCRIN [address]  Scheduled Appointment: 04/19/2023     Sidra Munoz  [facility]  CARDIOLOGY [address]  Scheduled Appointment: 02/09/2023    Denise Katz  [facility]  INTMED [address]  Scheduled Appointment: 02/22/2023    Dion Tinoco  [facility]  CARDIOLOGY [address]  Scheduled Appointment: 03/06/2023    Arcadio Campbell  [facility]  END[address]CRIN [address]  Scheduled Appointment: 04/19/2023

## 2023-02-06 NOTE — DISCHARGE NOTE PROVIDER - CARE PROVIDERS DIRECT ADDRESSES
[email or web address]

## 2023-02-06 NOTE — PROGRESS NOTE ADULT - SUBJECTIVE AND OBJECTIVE BOX
PROGRESS NOTE:     Patient is a 88y old  Female who presents with a chief complaint of L foot swelling (06 Feb 2023 00:38)      SUBJECTIVE / OVERNIGHT EVENTS:     REVIEW OF SYSTEMS:    CONSTITUTIONAL: No weakness, fevers or chills  EYES/ENT: No visual changes;  No vertigo or throat pain   NECK: No pain or stiffness  RESPIRATORY: No cough, wheezing, hemoptysis; No shortness of breath  CARDIOVASCULAR: No chest pain or palpitations  GASTROINTESTINAL: No abdominal or epigastric pain. No nausea, vomiting, or hematemesis; No diarrhea or constipation. No melena or hematochezia.  GENITOURINARY: No dysuria, frequency or hematuria  NEUROLOGICAL: No numbness or weakness  SKIN: No itching, rashes    MEDICATIONS  (STANDING):  aMIOdarone    Tablet 200 milliGRAM(s) Oral daily  apixaban 2.5 milliGRAM(s) Oral two times a day  chlorhexidine 2% Cloths 1 Application(s) Topical daily  clonazePAM  Tablet 0.5 milliGRAM(s) Oral two times a day  famotidine    Tablet 20 milliGRAM(s) Oral daily  metoprolol succinate ER 25 milliGRAM(s) Oral daily  polyethylene glycol 3350 17 Gram(s) Oral daily  simvastatin 20 milliGRAM(s) Oral at bedtime    MEDICATIONS  (PRN):  acetaminophen     Tablet .. 650 milliGRAM(s) Oral every 6 hours PRN Temp greater or equal to 38C (100.4F), Mild Pain (1 - 3)  melatonin 3 milliGRAM(s) Oral at bedtime PRN Insomnia      CAPILLARY BLOOD GLUCOSE        I&O's Summary    05 Feb 2023 07:01  -  06 Feb 2023 07:00  --------------------------------------------------------  IN: 220 mL / OUT: 0 mL / NET: 220 mL        PHYSICAL EXAM:  Vital Signs Last 24 Hrs  T(C): 36.9 (06 Feb 2023 05:54), Max: 36.9 (06 Feb 2023 05:54)  T(F): 98.4 (06 Feb 2023 05:54), Max: 98.4 (06 Feb 2023 05:54)  HR: 96 (06 Feb 2023 05:54) (76 - 96)  BP: 160/87 (06 Feb 2023 05:54) (160/87 - 166/108)  BP(mean): --  RR: 18 (06 Feb 2023 05:54) (18 - 18)  SpO2: 92% (06 Feb 2023 05:54) (92% - 96%)    Parameters below as of 06 Feb 2023 05:54  Patient On (Oxygen Delivery Method): room air        CONSTITUTIONAL: NAD, well-developed  RESPIRATORY: Normal respiratory effort; lungs are clear to auscultation bilaterally  CARDIOVASCULAR: Regular rate and rhythm, normal S1 and S2, no murmur/rub/gallop; No lower extremity edema; Peripheral pulses are 2+ bilaterally  ABDOMEN: Nontender to palpation, normoactive bowel sounds, no rebound/guarding; No hepatosplenomegaly  MUSCLOSKELETAL: no clubbing or cyanosis of digits; no joint swelling or tenderness to palpation  PSYCH: A+O to person, place, and time; affect appropriate  NEURO: Non-focal, no tremors  SKIN: No rashes    LABS:                        14.7   5.70  )-----------( 211      ( 06 Feb 2023 05:59 )             45.5     02-06    138  |  102  |  34<H>  ----------------------------<  103<H>  4.9   |  26  |  1.53<H>    Ca    9.0      06 Feb 2023 06:00  Phos  3.5     02-06  Mg     2.0     02-06    TPro  6.3  /  Alb  3.4  /  TBili  0.5  /  DBili  x   /  AST  45<H>  /  ALT  53<H>  /  AlkPhos  76  02-06                RADIOLOGY & ADDITIONAL TESTS:  No new imaging or tests    COORDINATION OF CARE:  Care Discussed with Consultants/Other Providers [Y/N]:  Prior or Outpatient Records Reviewed [Y/N]:   PROGRESS NOTE:     Patient is a 88y old  Female who presents with a chief complaint of L foot swelling (06 Feb 2023 00:38)      SUBJECTIVE / OVERNIGHT EVENTS: Overnight, no acute events. This AM, pt is feeling well. Says she still has some trouble breathing when she walks likely 2/2 fluid overload. TTE shows RV dysfunction and possible pulmonary HTN. Was able to ambulate at 94% spo2. Pt would like to go home.     REVIEW OF SYSTEMS:    CONSTITUTIONAL: No weakness, fevers or chills  EYES/ENT: No visual changes;  No vertigo or throat pain   NECK: No pain or stiffness  RESPIRATORY: No cough, wheezing, hemoptysis; +JOHNSON  CARDIOVASCULAR: No chest pain or palpitations  GASTROINTESTINAL: No abdominal or epigastric pain. No nausea, vomiting, or hematemesis; No diarrhea, +constipation. No melena or hematochezia.  GENITOURINARY: No dysuria, frequency or hematuria  NEUROLOGICAL: No numbness or weakness  SKIN: No itching, rashes    MEDICATIONS  (STANDING):  aMIOdarone    Tablet 200 milliGRAM(s) Oral daily  apixaban 2.5 milliGRAM(s) Oral two times a day  chlorhexidine 2% Cloths 1 Application(s) Topical daily  clonazePAM  Tablet 0.5 milliGRAM(s) Oral two times a day  famotidine    Tablet 20 milliGRAM(s) Oral daily  metoprolol succinate ER 25 milliGRAM(s) Oral daily  polyethylene glycol 3350 17 Gram(s) Oral daily  simvastatin 20 milliGRAM(s) Oral at bedtime    MEDICATIONS  (PRN):  acetaminophen     Tablet .. 650 milliGRAM(s) Oral every 6 hours PRN Temp greater or equal to 38C (100.4F), Mild Pain (1 - 3)  melatonin 3 milliGRAM(s) Oral at bedtime PRN Insomnia      CAPILLARY BLOOD GLUCOSE        I&O's Summary    05 Feb 2023 07:01  -  06 Feb 2023 07:00  --------------------------------------------------------  IN: 220 mL / OUT: 0 mL / NET: 220 mL        PHYSICAL EXAM:  Vital Signs Last 24 Hrs  T(C): 36.9 (06 Feb 2023 05:54), Max: 36.9 (06 Feb 2023 05:54)  T(F): 98.4 (06 Feb 2023 05:54), Max: 98.4 (06 Feb 2023 05:54)  HR: 96 (06 Feb 2023 05:54) (76 - 96)  BP: 160/87 (06 Feb 2023 05:54) (160/87 - 166/108)  BP(mean): --  RR: 18 (06 Feb 2023 05:54) (18 - 18)  SpO2: 92% (06 Feb 2023 05:54) (92% - 96%)    Parameters below as of 06 Feb 2023 05:54  Patient On (Oxygen Delivery Method): room air        CONSTITUTIONAL: NAD, well-developed  RESPIRATORY: Normal respiratory effort; lungs are clear to auscultation bilaterally  CARDIOVASCULAR: Regular rate and rhythm, normal S1 and S2, no murmur/rub/gallop; +1 b/l LE; Peripheral pulses are 2+ bilaterally  ABDOMEN: Nontender to palpation, normoactive bowel sounds, no rebound/guarding; No hepatosplenomegaly  MUSCLOSKELETAL: no clubbing or cyanosis of digits; no joint swelling or tenderness to palpation  PSYCH: A+O to person, place, and time; affect appropriate  NEURO: Non-focal, no tremors  SKIN: Continues to have skin darkening over L lower foot     LABS:                        14.7   5.70  )-----------( 211      ( 06 Feb 2023 05:59 )             45.5     02-06    138  |  102  |  34<H>  ----------------------------<  103<H>  4.9   |  26  |  1.53<H>    Ca    9.0      06 Feb 2023 06:00  Phos  3.5     02-06  Mg     2.0     02-06    TPro  6.3  /  Alb  3.4  /  TBili  0.5  /  DBili  x   /  AST  45<H>  /  ALT  53<H>  /  AlkPhos  76  02-06                RADIOLOGY & ADDITIONAL TESTS:  No new imaging or tests    COORDINATION OF CARE:  Care Discussed with Consultants/Other Providers [Y/N]:  Prior or Outpatient Records Reviewed [Y/N]:

## 2023-02-06 NOTE — DISCHARGE NOTE PROVIDER - HOSPITAL COURSE
88 Year old woman w/ PMH hypertension, hyperlipidemia, A.fib on Eliquis and amiodarone came  w/ left leg swelling and pain L>R, sent in by PMD to the ED due to concern of PAD vs DVT, on presentation DVT duplex wnl, BNP>1900, s/p Lasix 40 mg IV x 1, presentation likely 2/2 venous status vs fluid overload 2/2 HF component. Vascular surgery consulted with no intervention recommended. Patient also with IRA (got better but worse again) She had some hypokalemia (K 2.6)  that responded to repletion. TTE performed which revealed ____. Patient's home losartan held iso IRA.  88 Year old woman w/ PMH hypertension, hyperlipidemia, A.fib on Eliquis and amiodarone came  w/ left leg swelling and pain L>R, sent in by PMD to the ED due to concern of PAD vs DVT, on presentation DVT duplex wnl, BNP>1900, s/p Lasix 40 mg IV x 1, presentation likely 2/2 venous status vs fluid overload 2/2 HF component. Vascular surgery consulted with no intervention recommended. Patient also with IRA that was improving upon discharge. Patient's home losartan was held iso IRA. TTE was performed which showed normal left ventricular systolic function, no segmental wall motion abnormalities, right ventricular enlargement with decreased right ventricular systolic function, moderate pulmonary hypertension, bilateral pleural effusions. Will send patient home on lasix 40mg PO QD and hold home losartan as patient is also already on metoprolol and would like to avoid any fluctuations in BP and patient also has IRA at this time, although resolving. Pt has a follow-up appt with Dr. Munoz where medication adjustments can be made. Pt is feeling well, stable, ready for DC with outpatient followup.    88 Year old woman w/ PMH hypertension, hyperlipidemia, A.fib on Eliquis and amiodarone came  w/ left leg swelling and pain L>R, sent in by PMD to the ED due to concern of PAD vs DVT, on presentation DVT duplex wnl, BNP>1900, s/p Lasix 40 mg IV x 1, presentation likely 2/2 venous status vs fluid overload 2/2 HF component. Vascular surgery consulted with no intervention recommended. Patient also with IRA that was improving upon discharge. Patient's home losartan was held iso IRA. TTE was performed which showed normal left ventricular systolic function, no segmental wall motion abnormalities, right ventricular enlargement with decreased right ventricular systolic function, moderate pulmonary hypertension, bilateral pleural effusions. Will send patient home on lasix 40mg PO QD and hold home losartan as patient is also already on metoprolol and would like to avoid any fluctuations in BP and patient also has IRA at this time, although resolving. Pt has a follow-up appt with Dr. Munoz where medication adjustments can be made. Pt is feeling well, stable, ready for DC with outpatient followup.

## 2023-02-06 NOTE — PROGRESS NOTE ADULT - PROBLEM/PLAN-1
Rx Refill Note  Requested Prescriptions     Pending Prescriptions Disp Refills   • apixaban (Eliquis) 5 MG tablet tablet 180 tablet 0     Sig: Take 1 tablet by mouth Every 12 (Twelve) Hours.      Last office visit with prescribing clinician: 12/10/2021     Next office visit with prescribing clinician: 12/16/2022 GEOVANY Pickens MA  02/28/22, 09:14 EST  
DISPLAY PLAN FREE TEXT
DISPLAY PLAN FREE TEXT

## 2023-02-06 NOTE — SWALLOW BEDSIDE ASSESSMENT ADULT - SWALLOW EVAL: DIAGNOSIS
Pt is 89 y/o F admitted for L foot swelling. Now presenting with a grossly functional oropharyngeal swallow sequence. No overt signs of laryngeal penetration/aspiration observed on exam.

## 2023-02-06 NOTE — PLAN
[FreeTextEntry1] : Follow- up\par \par Lower extremity edema\par Advised pt that she should be evaluated in the ED for lower extremity discoloration. Pt reluctant to go, states she doesn't have anyone to take her. Will check labs, order Doppler's Venous and arterial \par Vascular referral \par \par At patient request called daughter Francoise expressed concern about decreased LE circulation/ unablr to palpate pulse  and advised that mother should be evaluated in ED. Daughter states she lives 3 hours away in [place] and has her own appts and unsure when of if she can come to take her today or tomorrow \par \par HTN/HLD/Afib\par Low sodium, low cholesterol diet/ increased physical activity\par cont Losartan 50 mg daily\par cont Metoprolol 25 mg daily\par cont Simvastatin 20 mg daily\par cont Eliquis 2.5 mg twice daily \par cont Amiodarone 200 mg daily \par Follows with Dr. Munoz \par \par Elevated LFT's\par Will check labs\par \par Elevated Creatinine \par Will check labs today \par \par Elevated TSH \par will recheck today \par \par \par

## 2023-02-06 NOTE — PROGRESS NOTE ADULT - PROBLEM SELECTOR PLAN 1
elevated Cr of 1.8 (baseline Cr  of 0.6-0.7)   - UA grossly negative  - avoid NSAIDs, ACE/ARBs, nephrotoxic drugs, and contrast  - daily weight, strict I/O  - consider urine lytes, follow-up FEUrea,  s/p Lasix 40 mg IV, follow-up TTE  - monitor Cr Echo showing RV dysfunction likely 2/2 moderate pulmonary HTN; likely contributing to patient's ADELIA and SOB   - dc'ing with 40mg lasix PO   - hold home losartan while taking lasix PO for IRA and also to avoid hypotension   - will f/u with cardiology outpatient

## 2023-02-06 NOTE — DISCHARGE NOTE PROVIDER - NSDCMRMEDTOKEN_GEN_ALL_CORE_FT
amiodarone 200 mg oral tablet: 1 tab(s) orally once a day  clonazePAM 0.5 mg oral tablet: 1 tab(s) orally 2 times a day  Eliquis 2.5 mg oral tablet: 1 tab(s) orally 2 times a day  famotidine 20 mg oral tablet: 1 tab(s) orally 2 times a day  Metoprolol Succinate ER 25 mg oral tablet, extended release: 1 tab(s) orally once a day  simvastatin 20 mg oral tablet: 1 tab(s) orally once a day (at bedtime)   amiodarone 200 mg oral tablet: 1 tab(s) orally once a day  clonazePAM 0.5 mg oral tablet: 1 tab(s) orally 2 times a day  Eliquis 2.5 mg oral tablet: 1 tab(s) orally 2 times a day  famotidine 20 mg oral tablet: 1 tab(s) orally 2 times a day  Lasix 40 mg oral tablet: 1 tab(s) orally once a day   Metoprolol Succinate ER 25 mg oral tablet, extended release: 1 tab(s) orally once a day  simvastatin 20 mg oral tablet: 1 tab(s) orally once a day (at bedtime)

## 2023-02-06 NOTE — PROGRESS NOTE ADULT - PROBLEM SELECTOR PLAN 7
- on eliquis for Afib   -S+S evaluation for dysphagia per history  PT consult placed - famotidine 20 mg qd home medication

## 2023-02-06 NOTE — DISCHARGE NOTE NURSING/CASE MANAGEMENT/SOCIAL WORK - NSDCFUADDAPPT_GEN_ALL_CORE_FT
APPTS ARE READY TO BE MADE: [X] YES    Best Family or Patient Contact (if needed):    Additional Information about above appointments (if needed):    1: Please follow-up with primary care physician within 1-2 weeks of discharge.   2: Please follow-up with the vascular surgeon within 1 month of discharge.   3: Please follow-up with the cardiologist within 1 month of discharge.     Other comments or requests:

## 2023-02-06 NOTE — SWALLOW BEDSIDE ASSESSMENT ADULT - SWALLOW EVAL: CRITERIA FOR SKILLED INTERVENTION MET
Suspect reported SOB with PO intake in am is related to possible chronic HF./no problems identified which require skilled intervention

## 2023-02-06 NOTE — PROGRESS NOTE ADULT - PROBLEM SELECTOR PLAN 5
- c/w home simvastatin c/w toprolol home med  - hold losartan iso IRA and now with concomitant use of lasix

## 2023-02-06 NOTE — PROGRESS NOTE ADULT - PROBLEM SELECTOR PLAN 2
- per history 1 month of L>R lower extremity swelling, coldness in the lower extremity however palpable pulses, Venous Duplex of b/l lower extremity demonstrating no signs of DVT  - vascular surgery consulted with no intervention, recommending outpatient follow-up  - patient appears euvolemic on exam   - likely chronic edema vs heart failure component, pending ECHO  - venous compression stockings, elevate legs - per history 1 month of L>R lower extremity swelling, coldness in the lower extremity however palpable pulses, Venous Duplex of b/l lower extremity demonstrating no signs of DVT; also RHF and pulmonary HTN likely largely contributing to leg swelling   - vascular surgery consulted with no intervention, recommending outpatient follow-up  - patient appears euvolemic on exam   - likely chronic edema vs heart failure component, pending ECHO  - venous compression stockings, elevate legs

## 2023-02-06 NOTE — PROGRESS NOTE ADULT - PROBLEM SELECTOR PLAN 3
- c/w home eliquis and toprolol xl and amiodarone  - TSH elevated, but normal Ft4 - subclinical hypothyroidism vs age related vs amiodarone related   - outpt f/u with PMD for repeat labs elevated Cr of 1.8 (baseline Cr  of 0.6-0.7); Cr on 2/6 improved to 1.53   - UA grossly negative  - avoid NSAIDs, ACE/ARBs, nephrotoxic drugs, and contrast  - recommend monitoring of Cr as outpatient

## 2023-02-06 NOTE — PROGRESS NOTE ADULT - PROBLEM SELECTOR PLAN 4
c/w toprolol home med  - hold losartan iso IRA - c/w home eliquis and toprolol xl and amiodarone  - TSH elevated, but normal Ft4 - subclinical hypothyroidism vs age related vs amiodarone related   - outpt f/u with PMD for repeat labs

## 2023-02-06 NOTE — DISCHARGE NOTE PROVIDER - NSDCCPCAREPLAN_GEN_ALL_CORE_FT
PRINCIPAL DISCHARGE DIAGNOSIS  Diagnosis: Leg swelling  Assessment and Plan of Treatment: Venous stasis is a condition in which blood flow in the veins is disrupted, leading to a buildup of fluid in the legs and ankles. This can cause swelling, pain, and a range of other symptoms.  Common causes of venous stasis include blood clots, varicose veins, and other medical conditions that affect blood flow. In some cases, prolonged standing or sitting can also contribute to the development of the condition.  To prevent or manage venous stasis, it's important to make lifestyle changes that support good circulation, such as exercising regularly, maintaining a healthy diet, and avoiding prolonged periods of sitting or standing.  Treatment for venous stasis may involve compression stockings, elevating the legs, and other measures to support blood flow. In severe cases, surgical intervention may be necessary to address underlying medical conditions that are contributing to the development of the condition.  It's important to seek regular medical care to monitor symptoms and prevent complications related to venous stasis. With proper treatment and management, it's possible to improve symptoms and maintain good health.      SECONDARY DISCHARGE DIAGNOSES  Diagnosis: IRA (acute kidney injury)  Assessment and Plan of Treatment: Acute kidney injury (IRA) is a sudden and temporary loss of kidney function. This can occur as a result of a range of underlying medical conditions, such as dehydration, sepsis, or a blockage in the urinary tract.  IRA can cause a range of symptoms, including decreased urine output, swelling, and shortness of breath. In severe cases, IRA can lead to life-threatening complications, such as kidney failure and death.  To prevent IRA, it's important to maintain good overall health, including proper hydration and avoiding medications that can harm the kidneys. If you are experiencing symptoms of IRA, it's important to seek prompt medical attention.  Treatment for IRA may involve measures to support kidney function and address the underlying cause of the condition, such as medications, dialysis, or other therapies. In some cases, a kidney transplant may be necessary.  It's important for individuals with IRA to seek regular medical care to monitor their kidney function and prevent complications. With proper management and support, it's possible to recover from IRA and maintain good kidney health.    Diagnosis: Atrial fibrillation  Assessment and Plan of Treatment: Atrial fibrillation (AFib) is a type of heart rhythm disorder that causes the heart to beat irregularly and fast. This can increase the risk of blood clots, stroke, and other health problems.  Common symptoms of AFib include palpitations, shortness of breath, and fatigue. However, many individuals with AFib may not experience any symptoms at all.  AFib is often caused by underlying medical conditions, such as high blood pressure, heart disease, or sleep apnea. It can also be caused by lifestyle factors, such as excessive alcohol consumption, stress, and lack of physical activity.  To prevent AFib, it's important to maintain a healthy lifestyle, including exercising regularly, eating a healthy diet, and avoiding excessive alcohol consumption. If you are experiencing symptoms of AFib, it's important to seek prompt medical attention.  Treatment for AFib may involve medications to regulate heart rate and rhythm, as well as lifestyle changes to support heart health, such as reducing stress and increasing physical activity. In some cases, a procedure known as ablation may be necessary to correct the heart rhythm.  It's important for individuals with AFib to seek regular medical care to monitor their condition and prevent complications. With proper management and support, it's possible to live a healthy, active life with AFib.     PRINCIPAL DISCHARGE DIAGNOSIS  Diagnosis: Leg swelling  Assessment and Plan of Treatment: You came to the hospital because you had leg swelling. This leg swelling is likely secondary to both venous stasis and right ventricular heart dysfunction as a result of pulmonary hypertension. Venous stasis is a condition in which blood flow in the veins is disrupted, leading to a buildup of fluid in the legs and ankles. This can cause swelling, pain, and a range of other symptoms.  Common causes of venous stasis include blood clots, varicose veins, and other medical conditions that affect blood flow. In some cases, prolonged standing or sitting can also contribute to the development of the condition.  To prevent or manage venous stasis, it's important to make lifestyle changes that support good circulation, such as exercising regularly, maintaining a healthy diet, and avoiding prolonged periods of sitting or standing.  Treatment for venous stasis may involve compression stockings, elevating the legs, and other measures to support blood flow. In severe cases, surgical intervention may be necessary to address underlying medical conditions that are contributing to the development of the condition.  Right ventricular heart dysfunction is when the part of your heart that pumps blood to your lungs is weakened. This is likely a result of high pressure in your lungs which could result for a number of reasons, but in many cases occurs randomly. This can cause fluid buildup and swelling in the legs. Please follow up with Dr. Munoz for further management. The lasix you were prescribed on this visit should help with the fluid in the mean time.   Please take one (1) tablet of lasix 40mg by mouth daily. Please STOP taking your losartan medication until your appointment with Dr. Munoz as concomitant use of these medications may make your blood pressure too low. Your appointment is on 2/9 at 1:15pm.      SECONDARY DISCHARGE DIAGNOSES  Diagnosis: Atrial fibrillation  Assessment and Plan of Treatment: Atrial fibrillation (AFib) is a type of heart rhythm disorder that causes the heart to beat irregularly and fast. This can increase the risk of blood clots, stroke, and other health problems.  Common symptoms of AFib include palpitations, shortness of breath, and fatigue. However, many individuals with AFib may not experience any symptoms at all.  AFib is often caused by underlying medical conditions, such as high blood pressure, heart disease, or sleep apnea. It can also be caused by lifestyle factors, such as excessive alcohol consumption, stress, and lack of physical activity.  To prevent AFib, it's important to maintain a healthy lifestyle, including exercising regularly, eating a healthy diet, and avoiding excessive alcohol consumption. If you are experiencing symptoms of AFib, it's important to seek prompt medical attention.  Treatment for AFib may involve medications to regulate heart rate and rhythm, as well as lifestyle changes to support heart health, such as reducing stress and increasing physical activity. In some cases, a procedure known as ablation may be necessary to correct the heart rhythm.  It's important for individuals with AFib to seek regular medical care to monitor their condition and prevent complications. With proper management and support, it's possible to live a healthy, active life with AFib.   Please continue taking your metoprolol, amiodarone, and eliquis daily    Diagnosis: IRA (acute kidney injury)  Assessment and Plan of Treatment: Acute kidney injury (IRA) is a sudden and temporary loss of kidney function. This can occur as a result of a range of underlying medical conditions, such as dehydration, sepsis, or a blockage in the urinary tract.  IRA can cause a range of symptoms, including decreased urine output, swelling, and shortness of breath. In severe cases, IRA can lead to life-threatening complications, such as kidney failure and death.  To prevent IRA, it's important to maintain good overall health, including proper hydration and avoiding medications that can harm the kidneys. If you are experiencing symptoms of IRA, it's important to seek prompt medical attention.  Treatment for IRA may involve measures to support kidney function and address the underlying cause of the condition, such as medications, dialysis, or other therapies. In some cases, a kidney transplant may be necessary.  It's important for individuals with IRA to seek regular medical care to monitor their kidney function and prevent complications. With proper management and support, it's possible to recover from IRA and maintain good kidney health.     PRINCIPAL DISCHARGE DIAGNOSIS  Diagnosis: Leg swelling  Assessment and Plan of Treatment: You came to the hospital because you had leg swelling. This leg swelling is likely secondary to both venous stasis and right ventricular heart dysfunction as a result of pulmonary hypertension. Venous stasis is a condition in which blood flow in the veins is disrupted, leading to a buildup of fluid in the legs and ankles. This can cause swelling, pain, and a range of other symptoms.  Common causes of venous stasis include blood clots, varicose veins, and other medical conditions that affect blood flow. In some cases, prolonged standing or sitting can also contribute to the development of the condition.  To prevent or manage venous stasis, it's important to make lifestyle changes that support good circulation, such as exercising regularly, maintaining a healthy diet, and avoiding prolonged periods of sitting or standing.  Treatment for venous stasis may involve compression stockings, elevating the legs, and other measures to support blood flow. In severe cases, surgical intervention may be necessary to address underlying medical conditions that are contributing to the development of the condition.  Right ventricular heart dysfunction is when the part of your heart that pumps blood to your lungs is weakened. This is likely a result of high pressure in your lungs which could result for a number of reasons, but in many cases occurs randomly. This can cause fluid buildup and swelling in the legs. Please follow up with Dr. Munoz for further management. The lasix you were prescribed on this visit should help with the fluid in the mean time.   Please take one (1) tablet of lasix 40mg by mouth daily. Please STOP taking your losartan medication until your appointment with Dr. Munoz as concomitant use of these medications may make your blood pressure too low. Your appointment is on 2/9 at 1:15pm.  Please seek medical attention if you have palpitations, dizziness, loss of consciousness, weakness, worsening swelling in your legs, shortness of breath, or chest pain.      SECONDARY DISCHARGE DIAGNOSES  Diagnosis: Atrial fibrillation  Assessment and Plan of Treatment: Atrial fibrillation (AFib) is a type of heart rhythm disorder that causes the heart to beat irregularly and fast. This can increase the risk of blood clots, stroke, and other health problems.  Common symptoms of AFib include palpitations, shortness of breath, and fatigue. However, many individuals with AFib may not experience any symptoms at all.  AFib is often caused by underlying medical conditions, such as high blood pressure, heart disease, or sleep apnea. It can also be caused by lifestyle factors, such as excessive alcohol consumption, stress, and lack of physical activity.  To prevent AFib, it's important to maintain a healthy lifestyle, including exercising regularly, eating a healthy diet, and avoiding excessive alcohol consumption. If you are experiencing symptoms of AFib, it's important to seek prompt medical attention.  Treatment for AFib may involve medications to regulate heart rate and rhythm, as well as lifestyle changes to support heart health, such as reducing stress and increasing physical activity. In some cases, a procedure known as ablation may be necessary to correct the heart rhythm.  It's important for individuals with AFib to seek regular medical care to monitor their condition and prevent complications. With proper management and support, it's possible to live a healthy, active life with AFib.   Please continue taking your metoprolol, amiodarone, and eliquis daily    Diagnosis: IRA (acute kidney injury)  Assessment and Plan of Treatment: Acute kidney injury (IRA) is a sudden and temporary loss of kidney function. This can occur as a result of a range of underlying medical conditions, such as dehydration, sepsis, or a blockage in the urinary tract.  IRA can cause a range of symptoms, including decreased urine output, swelling, and shortness of breath. In severe cases, IRA can lead to life-threatening complications, such as kidney failure and death.  To prevent IRA, it's important to maintain good overall health, including proper hydration and avoiding medications that can harm the kidneys. If you are experiencing symptoms of IRA, it's important to seek prompt medical attention.  Treatment for IRA may involve measures to support kidney function and address the underlying cause of the condition, such as medications, dialysis, or other therapies. In some cases, a kidney transplant may be necessary.  It's important for individuals with IRA to seek regular medical care to monitor their kidney function and prevent complications. With proper management and support, it's possible to recover from IRA and maintain good kidney health.     PRINCIPAL DISCHARGE DIAGNOSIS  Diagnosis: Leg swelling  Assessment and Plan of Treatment: You came to the hospital because you had leg swelling. This leg swelling is likely secondary to both venous stasis and right ventricular heart dysfunction as a result of pulmonary hypertension. Venous stasis is a condition in which blood flow in the veins is disrupted, leading to a buildup of fluid in the legs and ankles. This can cause swelling, pain, and a range of other symptoms.  Common causes of venous stasis include blood clots, varicose veins, and other medical conditions that affect blood flow. In some cases, prolonged standing or sitting can also contribute to the development of the condition.  To prevent or manage venous stasis, it's important to make lifestyle changes that support good circulation, such as exercising regularly, maintaining a healthy diet, and avoiding prolonged periods of sitting or standing.  Treatment for venous stasis may involve compression stockings, elevating the legs, and other measures to support blood flow. In severe cases, surgical intervention may be necessary to address underlying medical conditions that are contributing to the development of the condition.  Right ventricular heart dysfunction is when the part of your heart that pumps blood to your lungs is weakened. This is likely a result of high pressure in your lungs which could result for a number of reasons, but in many cases occurs randomly. This can cause fluid buildup and swelling in the legs. Please follow up with Dr. Munoz for further management. The lasix you were prescribed on this visit should help with the fluid in the mean time.   Please take one (1) tablet of lasix 40mg by mouth daily. Please STOP taking your losartan medication until your appointment with Dr. Munoz as concomitant use of these medications may make your blood pressure too low. Your appointment is on 2/9 at 1:15pm.  Please seek medical attention if you have palpitations, dizziness, loss of consciousness, weakness, worsening swelling in your legs, shortness of breath, or chest pain.      SECONDARY DISCHARGE DIAGNOSES  Diagnosis: Atrial fibrillation  Assessment and Plan of Treatment: Atrial fibrillation (AFib) is a type of heart rhythm disorder that causes the heart to beat irregularly and fast. This can increase the risk of blood clots, stroke, and other health problems.  Common symptoms of AFib include palpitations, shortness of breath, and fatigue. However, many individuals with AFib may not experience any symptoms at all.  AFib is often caused by underlying medical conditions, such as high blood pressure, heart disease, or sleep apnea. It can also be caused by lifestyle factors, such as excessive alcohol consumption, stress, and lack of physical activity.  To prevent AFib, it's important to maintain a healthy lifestyle, including exercising regularly, eating a healthy diet, and avoiding excessive alcohol consumption. If you are experiencing symptoms of AFib, it's important to seek prompt medical attention.  Treatment for AFib may involve medications to regulate heart rate and rhythm, as well as lifestyle changes to support heart health, such as reducing stress and increasing physical activity. In some cases, a procedure known as ablation may be necessary to correct the heart rhythm.  It's important for individuals with AFib to seek regular medical care to monitor their condition and prevent complications. With proper management and support, it's possible to live a healthy, active life with AFib.   Please continue taking your metoprolol, amiodarone, and eliquis daily    Diagnosis: IRA (acute kidney injury)  Assessment and Plan of Treatment: Acute kidney injury (IRA) is a sudden and temporary loss of kidney function. This can occur as a result of a range of underlying medical conditions, such as dehydration, sepsis, or a blockage in the urinary tract.  IRA can cause a range of symptoms, including decreased urine output, swelling, and shortness of breath. In severe cases, IRA can lead to life-threatening complications, such as kidney failure and death.  To prevent IRA, it's important to maintain good overall health, including proper hydration and avoiding medications that can harm the kidneys. If you are experiencing symptoms of IRA, it's important to seek prompt medical attention.  Treatment for IRA may involve measures to support kidney function and address the underlying cause of the condition, such as medications, dialysis, or other therapies. In some cases, a kidney transplant may be necessary.  It's important for individuals with IRA to seek regular medical care to monitor their kidney function and prevent complications. With proper management and support, it's possible to recover from IRA and maintain good kidney health.    Diagnosis: Subclinical hypothyroidism  Assessment and Plan of Treatment: While you were in the hospital, we found that you had a low thyroid-stimulating hormone level. You did not have any symptoms of this, but we recommend that you get a repeat thyroid-stimulating hormone and free thyroxine level test in two weeks and follow-up with your PCP. You can get this done at any lab.     PRINCIPAL DISCHARGE DIAGNOSIS  Diagnosis: Leg swelling  Assessment and Plan of Treatment: You came to the hospital because you had leg swelling. This leg swelling is likely secondary to both venous stasis and right ventricular heart dysfunction as a result of pulmonary hypertension. Venous stasis is a condition in which blood flow in the veins is disrupted, leading to a buildup of fluid in the legs and ankles. This can cause swelling, pain, and a range of other symptoms.  Common causes of venous stasis include blood clots, varicose veins, and other medical conditions that affect blood flow. In some cases, prolonged standing or sitting can also contribute to the development of the condition.  To prevent or manage venous stasis, it's important to make lifestyle changes that support good circulation, such as exercising regularly, maintaining a healthy diet, and avoiding prolonged periods of sitting or standing.  Treatment for venous stasis may involve compression stockings, elevating the legs, and other measures to support blood flow. In severe cases, surgical intervention may be necessary to address underlying medical conditions that are contributing to the development of the condition.  Right ventricular heart dysfunction is when the part of your heart that pumps blood to your lungs is weakened. This is likely a result of high pressure in your lungs which could result for a number of reasons, but in many cases occurs randomly. This can cause fluid buildup and swelling in the legs. Please follow up with Dr. Munoz for further management. The lasix you were prescribed on this visit should help with the fluid in the mean time.   Please take one (1) tablet of lasix 40mg by mouth daily. Please STOP taking your losartan medication until your appointment with Dr. Munoz as concomitant use of these medications may make your blood pressure too low. Your appointment is on 2/9 at 1:15pm.  Please seek medical attention if you have palpitations, dizziness, loss of consciousness, weakness, worsening swelling in your legs, shortness of breath, or chest pain.      SECONDARY DISCHARGE DIAGNOSES  Diagnosis: Atrial fibrillation  Assessment and Plan of Treatment: Atrial fibrillation (AFib) is a type of heart rhythm disorder that causes the heart to beat irregularly and fast. This can increase the risk of blood clots, stroke, and other health problems.  Common symptoms of AFib include palpitations, shortness of breath, and fatigue. However, many individuals with AFib may not experience any symptoms at all.  AFib is often caused by underlying medical conditions, such as high blood pressure, heart disease, or sleep apnea. It can also be caused by lifestyle factors, such as excessive alcohol consumption, stress, and lack of physical activity.  To prevent AFib, it's important to maintain a healthy lifestyle, including exercising regularly, eating a healthy diet, and avoiding excessive alcohol consumption. If you are experiencing symptoms of AFib, it's important to seek prompt medical attention.  Treatment for AFib may involve medications to regulate heart rate and rhythm, as well as lifestyle changes to support heart health, such as reducing stress and increasing physical activity. In some cases, a procedure known as ablation may be necessary to correct the heart rhythm.  It's important for individuals with AFib to seek regular medical care to monitor their condition and prevent complications. With proper management and support, it's possible to live a healthy, active life with AFib.   Please continue taking your metoprolol, amiodarone, and Eliquis daily. Please seek medical attention if you experience worsening palpitations, shortness of breath, dizziness, change in mental status, focal weakness or facial droop, or loss of consciousness.    Diagnosis: IRA (acute kidney injury)  Assessment and Plan of Treatment: Acute kidney injury (IRA) is a sudden and temporary loss of kidney function. This can occur as a result of a range of underlying medical conditions, such as dehydration, sepsis, or a blockage in the urinary tract.  IRA can cause a range of symptoms, including decreased urine output, swelling, and shortness of breath. In severe cases, IRA can lead to life-threatening complications, such as kidney failure and death.  To prevent IRA, it's important to maintain good overall health, including proper hydration and avoiding medications that can harm the kidneys. If you are experiencing symptoms of IRA, it's important to seek prompt medical attention.  Treatment for IRA may involve measures to support kidney function and address the underlying cause of the condition, such as medications, dialysis, or other therapies. In some cases, a kidney transplant may be necessary.  It's important for individuals with IRA to seek regular medical care to monitor their kidney function and prevent complications. With proper management and support, it's possible to recover from IRA and maintain good kidney health.  .  Please seek medical attention if you experience decreased urination, blood in your urine, worsening abdominal or flank pain, or changes in mental status.    Diagnosis: Subclinical hypothyroidism  Assessment and Plan of Treatment: While you were in the hospital, we found that you had a low thyroid-stimulating hormone level. You did not have any symptoms of this, but we recommend that you get a repeat thyroid-stimulating hormone (TSH) and free thyroxine level test in two weeks and follow-up with your primary care provider (PCP). You can get this done at any lab. You will likely need to initiate therapy on thyroid medication, which your PCP will help to arrange.  .  Please speak with your doctor if you experience worsening constipation, intolerance to cold, fatigue, hair loss, weight gain, or confusion.

## 2023-02-06 NOTE — PHYSICAL EXAM
[No Acute Distress] : no acute distress [Normal Sclera/Conjunctiva] : normal sclera/conjunctiva [EOMI] : extraocular movements intact [Normal Outer Ear/Nose] : the outer ears and nose were normal in appearance [Normal Oropharynx] : the oropharynx was normal [No JVD] : no jugular venous distention [No Lymphadenopathy] : no lymphadenopathy [Supple] : supple [No Respiratory Distress] : no respiratory distress  [No Accessory Muscle Use] : no accessory muscle use [Clear to Auscultation] : lungs were clear to auscultation bilaterally [Normal Rate] : normal rate  [Regular Rhythm] : with a regular rhythm [Normal S1, S2] : normal S1 and S2 [No Murmur] : no murmur heard [No Carotid Bruits] : no carotid bruits [No Extremity Clubbing/Cyanosis] : no extremity clubbing/cyanosis [Soft] : abdomen soft [Non Tender] : non-tender [Non-distended] : non-distended [No Masses] : no abdominal mass palpated [Normal Bowel Sounds] : normal bowel sounds [Normal Posterior Cervical Nodes] : no posterior cervical lymphadenopathy [Normal Anterior Cervical Nodes] : no anterior cervical lymphadenopathy [No CVA Tenderness] : no CVA  tenderness [No Spinal Tenderness] : no spinal tenderness [Kyphosis] : kyphosis [No Joint Swelling] : no joint swelling [Grossly Normal Strength/Tone] : grossly normal strength/tone [No Rash] : no rash [Coordination Grossly Intact] : coordination grossly intact [No Focal Deficits] : no focal deficits [Normal Gait] : normal gait [Normal Affect] : the affect was normal [Normal Insight/Judgement] : insight and judgment were intact [de-identified] : + LE edema , + spider veins/ + left foot purple discoloration/ + cold to touch / unable to palpate dorsalis pedis  [de-identified] : LLE +purplish blue discoloration

## 2023-02-06 NOTE — DISCHARGE NOTE NURSING/CASE MANAGEMENT/SOCIAL WORK - PATIENT PORTAL LINK FT
You can access the FollowMyHealth Patient Portal offered by [facility] by registering at the following website: [email or web address]. By joining [facility]’s FollowMyHealth portal, you will also be able to view your health information using other applications (apps) compatible with our system.

## 2023-02-09 ENCOUNTER — NON-APPOINTMENT (OUTPATIENT)
Age: 88
End: 2023-02-09

## 2023-02-09 ENCOUNTER — APPOINTMENT (OUTPATIENT)
Dept: CARDIOLOGY | Facility: CLINIC | Age: 88
End: 2023-02-09
Payer: MEDICARE

## 2023-02-09 VITALS
HEART RATE: 102 BPM | SYSTOLIC BLOOD PRESSURE: 137 MMHG | RESPIRATION RATE: 12 BRPM | DIASTOLIC BLOOD PRESSURE: 84 MMHG | BODY MASS INDEX: 21.19 KG/M2 | HEIGHT: 67 IN | OXYGEN SATURATION: 95 % | WEIGHT: 135 LBS

## 2023-02-09 DIAGNOSIS — I51.7 CARDIOMEGALY: ICD-10-CM

## 2023-02-09 PROCEDURE — 99214 OFFICE O/P EST MOD 30 MIN: CPT | Mod: 25

## 2023-02-09 PROCEDURE — 93000 ELECTROCARDIOGRAM COMPLETE: CPT

## 2023-02-09 RX ORDER — METOPROLOL SUCCINATE 50 MG/1
50 TABLET, EXTENDED RELEASE ORAL
Qty: 90 | Refills: 1 | Status: DISCONTINUED | COMMUNITY
Start: 2021-10-27 | End: 2023-02-09

## 2023-02-10 PROBLEM — I51.7 RIGHT VENTRICULAR ENLARGEMENT: Status: ACTIVE | Noted: 2023-02-09

## 2023-02-10 LAB
ANION GAP SERPL CALC-SCNC: 14 MMOL/L
BUN SERPL-MCNC: 38 MG/DL
CALCIUM SERPL-MCNC: 9.6 MG/DL
CHLORIDE SERPL-SCNC: 98 MMOL/L
CO2 SERPL-SCNC: 26 MMOL/L
CREAT SERPL-MCNC: 1.68 MG/DL
EGFR: 29 ML/MIN/1.73M2
GLUCOSE SERPL-MCNC: 132 MG/DL
NT-PROBNP SERPL-MCNC: 2203 PG/ML
POTASSIUM SERPL-SCNC: 3.9 MMOL/L
SODIUM SERPL-SCNC: 138 MMOL/L

## 2023-02-10 NOTE — DISCUSSION/SUMMARY
[FreeTextEntry1] : The patient is an 88-year-old anxious female HTN, HLD, HH ,persistent A-fib with RV dysfunction and leg discoloration\par #1 Afib- c/w eliquis 2.5mg bid, c/w  toprol 25mg,  amiodarone 200mg daily, event monitor all afib, discussed cardioversion and refuses, does not want to stop amiodarone\par #2 HFrEF- RV failure, c/w lasix 40mg and restart losartan at 25mg \par #3 HLD- c/w simvastatin 20mg\par #4 Anxiety- on clonazepam and may safely d/c citalopram 5mg, stress management and discuss with daughter getting help at home. Reassurance and emotional support provided. Friend with her today.\par #5 PAD- she has previously refused f/u care now with worsening discoloration will see Dr. Tinoco\par Daughter lives in [facility] and she otherwise has no one near by.\par  [EKG obtained to assist in diagnosis and management of assessed problem(s)] : EKG obtained to assist in diagnosis and management of assessed problem(s)

## 2023-02-10 NOTE — HISTORY OF PRESENT ILLNESS
[FreeTextEntry1] : Gia is s/p hospitalization for LLE edema. Doppler negative. ECHO with RV dysfunction. WOrried about discoloration of both legs.

## 2023-02-10 NOTE — PHYSICAL EXAM
[Well Developed] : well developed [Well Nourished] : well nourished [No Acute Distress] : no acute distress [Normal Conjunctiva] : normal conjunctiva [Normal Venous Pressure] : normal venous pressure [No Carotid Bruit] : no carotid bruit [Normal S1, S2] : normal S1, S2 [No Murmur] : no murmur [No Rub] : no rub [No Gallop] : no gallop [Clear Lung Fields] : clear lung fields [Good Air Entry] : good air entry [No Respiratory Distress] : no respiratory distress  [Soft] : abdomen soft [Non Tender] : non-tender [No Masses/organomegaly] : no masses/organomegaly [Normal Bowel Sounds] : normal bowel sounds [Normal Gait] : normal gait [No Edema] : no edema [No Clubbing] : no clubbing [No Varicosities] : no varicosities [No Rash] : no rash [No Skin Lesions] : no skin lesions [Moves all extremities] : moves all extremities [No Focal Deficits] : no focal deficits [Normal Speech] : normal speech [Alert and Oriented] : alert and oriented [Normal memory] : normal memory [de-identified] : purplish feet

## 2023-02-22 ENCOUNTER — APPOINTMENT (OUTPATIENT)
Dept: INTERNAL MEDICINE | Facility: CLINIC | Age: 88
End: 2023-02-22
Payer: MEDICARE

## 2023-02-22 VITALS
OXYGEN SATURATION: 93 % | BODY MASS INDEX: 21.19 KG/M2 | RESPIRATION RATE: 12 BRPM | TEMPERATURE: 97.9 F | WEIGHT: 135 LBS | SYSTOLIC BLOOD PRESSURE: 151 MMHG | HEIGHT: 67 IN | HEART RATE: 86 BPM | DIASTOLIC BLOOD PRESSURE: 94 MMHG

## 2023-02-22 DIAGNOSIS — R79.9 ABNORMAL FINDING OF BLOOD CHEMISTRY, UNSPECIFIED: ICD-10-CM

## 2023-02-22 DIAGNOSIS — R79.89 OTHER SPECIFIED ABNORMAL FINDINGS OF BLOOD CHEMISTRY: ICD-10-CM

## 2023-02-22 DIAGNOSIS — N60.02 SOLITARY CYST OF LEFT BREAST: ICD-10-CM

## 2023-02-22 LAB
ALBUMIN SERPL ELPH-MCNC: 4.1 G/DL
ALP BLD-CCNC: 107 U/L
ALT SERPL-CCNC: 77 U/L
ANION GAP SERPL CALC-SCNC: 14 MMOL/L
AST SERPL-CCNC: 53 U/L
BASOPHILS # BLD AUTO: 0.05 K/UL
BASOPHILS NFR BLD AUTO: 0.7 %
BILIRUB SERPL-MCNC: 0.5 MG/DL
BUN SERPL-MCNC: 45 MG/DL
CALCIUM SERPL-MCNC: 9.8 MG/DL
CHLORIDE SERPL-SCNC: 97 MMOL/L
CO2 SERPL-SCNC: 28 MMOL/L
CREAT SERPL-MCNC: 1.73 MG/DL
EGFR: 28 ML/MIN/1.73M2
EOSINOPHIL # BLD AUTO: 0.13 K/UL
EOSINOPHIL NFR BLD AUTO: 1.7 %
GLUCOSE SERPL-MCNC: 101 MG/DL
HAV IGM SER QL: NONREACTIVE
HBV CORE IGM SER QL: NONREACTIVE
HBV SURFACE AG SER QL: NONREACTIVE
HCT VFR BLD CALC: 47 %
HCV AB SER QL: NONREACTIVE
HCV S/CO RATIO: 0.08 S/CO
HGB BLD-MCNC: 15.9 G/DL
IMM GRANULOCYTES NFR BLD AUTO: 0.9 %
LYMPHOCYTES # BLD AUTO: 1.35 K/UL
LYMPHOCYTES NFR BLD AUTO: 18.1 %
MAN DIFF?: NORMAL
MCHC RBC-ENTMCNC: 31.3 PG
MCHC RBC-ENTMCNC: 33.8 GM/DL
MCV RBC AUTO: 92.5 FL
MONOCYTES # BLD AUTO: 0.64 K/UL
MONOCYTES NFR BLD AUTO: 8.6 %
NEUTROPHILS # BLD AUTO: 5.2 K/UL
NEUTROPHILS NFR BLD AUTO: 70 %
NT-PROBNP SERPL-MCNC: 2012 PG/ML
PLATELET # BLD AUTO: 200 K/UL
POTASSIUM SERPL-SCNC: 3.6 MMOL/L
PROT SERPL-MCNC: 7.1 G/DL
RBC # BLD: 5.08 M/UL
RBC # FLD: 14.5 %
SODIUM SERPL-SCNC: 140 MMOL/L
TSH SERPL-ACNC: 15 UIU/ML
WBC # FLD AUTO: 7.44 K/UL

## 2023-02-22 PROCEDURE — 99214 OFFICE O/P EST MOD 30 MIN: CPT

## 2023-02-22 RX ORDER — AMIODARONE HYDROCHLORIDE 200 MG/1
200 TABLET ORAL
Qty: 104 | Refills: 1 | Status: DISCONTINUED | COMMUNITY
Start: 2022-12-14 | End: 2023-02-22

## 2023-02-22 NOTE — PLAN
[FreeTextEntry1] : Follow- up\par \par Lower extremity edema/ hyperpigmentation\par Patient has appointment with vascular Dr. Tinoco on 3/6/23\par \par HTN/HLD/Afib\par Low sodium, low cholesterol diet/ increased physical activity\par pt with elevated LFT/ elevated TSH ? Amio toxicity > spoke with pt cardio Dr. Munoz > \par will increase Metoprolol to  50 mg daily\par STOP Amiodarone 200 mg daily \par cont Simvastatin 20 mg daily\par cont Eliquis 2.5 mg twice daily \par cont Losartan 50 mg daily\par Follows with Dr. Munoz has appointment on 3/6/23\par \par Anxiety\par Follows with  psychiatrist Dr. Owens\par cont clonazepam 0.5 mg twice daily\par \par Elevated TSH / Hypothyroidism \par Start levothyroxine 25 mcgm QD \par will repeat TSH in 1 month \par \par Elevated LFT's\par \par Elevated Creatinine/BUN/ CKD \par referral to nephrologist\par will monitor renal function \par \par Anxiety\par Clonazepam prn \par \par \par patient care d/w pt daughter \par \par \par \par

## 2023-02-22 NOTE — REVIEW OF SYSTEMS
[Dyspnea on Exertion] : dyspnea on exertion [Constipation] : constipation [Suicidal] : not suicidal [Anxiety] : anxiety [Negative] : Neurological

## 2023-02-22 NOTE — PHYSICAL EXAM
[No CVA Tenderness] : no CVA  tenderness [No Spinal Tenderness] : no spinal tenderness [No Rash] : no rash [Normal] : normal gait, coordination grossly intact, no focal deficits and deep tendon reflexes were 2+ and symmetric [Normal Affect] : the affect was normal [Normal Mood] : the mood was normal [Normal Insight/Judgement] : insight and judgment were intact [de-identified] : a [de-identified] : +LE edema, +LE/ feet bluish  discoloration & cold to touch [de-identified] : + [de-identified] : +LLE hyperpigmentation blue discoloration

## 2023-02-22 NOTE — HISTORY OF PRESENT ILLNESS
[de-identified] : LEONIDAS Rausch is a 88 year old female with history of HTN, HLD, anxiety, presents for hospitals follow-up \par \par Pt was admitted to [facility] for evaluation of LLE> had Doppler negative for DVT \par Pt has an appointment with vascular Dr. Tinoco 3/6/23\par Patient has an appointment with cardiology Dr. Munoz 3/6/23\par Pt still has JOHNSON/ + bluish feet discoloration , denies local pain\par She suffers from constipation and states she takes MiraLAX to help.\par She lives alone and relies on neighbors and friends to take her to the appointments\par \par

## 2023-03-06 ENCOUNTER — NON-APPOINTMENT (OUTPATIENT)
Age: 88
End: 2023-03-06

## 2023-03-06 ENCOUNTER — APPOINTMENT (OUTPATIENT)
Dept: CARDIOLOGY | Facility: CLINIC | Age: 88
End: 2023-03-06
Payer: MEDICARE

## 2023-03-06 ENCOUNTER — LABORATORY RESULT (OUTPATIENT)
Age: 88
End: 2023-03-06

## 2023-03-06 VITALS
OXYGEN SATURATION: 93 % | WEIGHT: 130 LBS | SYSTOLIC BLOOD PRESSURE: 134 MMHG | DIASTOLIC BLOOD PRESSURE: 97 MMHG | TEMPERATURE: 96.6 F | BODY MASS INDEX: 20.4 KG/M2 | HEART RATE: 81 BPM | SYSTOLIC BLOOD PRESSURE: 157 MMHG | RESPIRATION RATE: 12 BRPM | DIASTOLIC BLOOD PRESSURE: 82 MMHG | HEIGHT: 67 IN

## 2023-03-06 DIAGNOSIS — I83.90 ASYMPTOMATIC VARICOSE VEINS OF UNSPECIFIED LOWER EXTREMITY: ICD-10-CM

## 2023-03-06 PROCEDURE — 93000 ELECTROCARDIOGRAM COMPLETE: CPT

## 2023-03-06 PROCEDURE — 99214 OFFICE O/P EST MOD 30 MIN: CPT

## 2023-03-06 PROCEDURE — 99214 OFFICE O/P EST MOD 30 MIN: CPT | Mod: 25

## 2023-03-06 NOTE — REASON FOR VISIT
[Follow-Up - Clinic] : a clinic follow-up of [FreeTextEntry1] : 3/6/2023\par Here for followup visit\par Seeing Dr. Munoz later today \par Echo with moderate pulmonary HTN\par Had a venous duplex last month which was negative ofr DVT\par Seen by PCP and cardio last month  for leg discoloration \par Here for vascular eval.\par Seen by [facility] surgery in ER  last month.  Rec compression\par of note BNP has been elevated with prior lab draws\par \par \par \par \par 5/7/2021\par She is her for vascular  evaluation of hand pains.  Numbness.  This started 3-4 months ago.\par When she sleeps its worse.  She is right handed.  She has numbness in the right 3rd and 2ndfingers.\par Her toes are not affected.  Points the a small raised area on the R palm.  Points to arthritis areas of the hand.  \par no ulceration on the feet\par She has no claudication.  \par \par She has a strong palpabe radial pulse bilaterally\par Anormal jad's test bilaterally\par She has bounding right DP and left PT\par \par \par

## 2023-03-06 NOTE — HISTORY OF PRESENT ILLNESS
[FreeTextEntry1] : Gia is now off amiodarone and higher dose toprol. Daughter came today. She logs BP daily 140-160. SOB easily. Belly discomfot at times. Saw Dr. Tinoco today and has appt with Dr. Mccrary.

## 2023-03-06 NOTE — DISCUSSION/SUMMARY
[FreeTextEntry1] : The patient is an 88-year-old anxious female HTN, HLD, HH ,persistent A-fib with RV dysfunction and leg discoloration and elevated creatining. \par #1 Afib- c/w eliquis 2.5mg bid, c/w  toprol 50mg, \par #2 HFrEF- RV failure, c/w lasix 40mg and  losartan at 25mg for now and evaluation with Dr. Mccrary next month, labs today\par #3 HLD- c/w simvastatin 20mg\par #4 Thyroid- started on levothyroxine, check TSH\par #5 Anxiety- on clonazepam, stress management and discuss with daughter getting help at home. \par #6 PAD- discoloration secondary to varicose veins, f/u Dr. Tinoco\par Daughter lives in [facility] and she otherwise has no one near by.\par  [EKG obtained to assist in diagnosis and management of assessed problem(s)] : EKG obtained to assist in diagnosis and management of assessed problem(s)

## 2023-03-06 NOTE — REASON FOR VISIT
[Symptom and Test Evaluation] : symptom and test evaluation [Arrhythmia/ECG Abnorrmalities] : arrhythmia/ECG abnormalities [Family Member] : family member

## 2023-03-06 NOTE — ASSESSMENT
[FreeTextEntry1] : Assessment\par 1.  Bilateral hand pains R>L\par - she has bounding radial artery pulse\par - normal allens test\par - no subclavian bruits\par - no carotid bruits\par - strong brachial pulses\par 2.  Normal pedal pulses\par \par Plan\par 1.   Venous duplex was negative for DVT (besides, she is on eliquis for afib, so doubt thrombotic event)\par 2.  Strong pedals on exam, no signs of arterial insufficiency \par 3  with elevation and warming the color of the L leg returned to normal\par 4.  this is likely due to the varicose vein causing some venous congestion of the left leg\par 5.  Compression, elevation and reassurance given\par no additional testing needed

## 2023-03-06 NOTE — REVIEW OF SYSTEMS
[SOB] : shortness of breath [Dyspnea on exertion] : dyspnea during exertion [Negative] : Heme/Lymph [Weight Loss (___ Lbs)] : [unfilled] ~Ulb weight loss [Chest Discomfort] : no chest discomfort [Lower Ext Edema] : no extremity edema [Palpitations] : no palpitations

## 2023-03-06 NOTE — PHYSICAL EXAM
[General Appearance - Well Developed] : well developed [Normal Appearance] : normal appearance [Well Groomed] : well groomed [General Appearance - Well Nourished] : well nourished [No Deformities] : no deformities [General Appearance - In No Acute Distress] : no acute distress [Normal Conjunctiva] : the conjunctiva exhibited no abnormalities [Eyelids - No Xanthelasma] : the eyelids demonstrated no xanthelasmas [Normal Oral Mucosa] : normal oral mucosa [No Oral Pallor] : no oral pallor [No Oral Cyanosis] : no oral cyanosis [Normal Jugular Venous A Waves Present] : normal jugular venous A waves present [Normal Jugular Venous V Waves Present] : normal jugular venous V waves present [No Jugular Venous Ernandez A Waves] : no jugular venous ernandez A waves [Respiration, Rhythm And Depth] : normal respiratory rhythm and effort [Exaggerated Use Of Accessory Muscles For Inspiration] : no accessory muscle use [Auscultation Breath Sounds / Voice Sounds] : lungs were clear to auscultation bilaterally [Heart Rate And Rhythm] : heart rate and rhythm were normal [Heart Sounds] : normal S1 and S2 [Murmurs] : no murmurs present [Abdomen Soft] : soft [Abdomen Tenderness] : non-tender [Abdomen Mass (___ Cm)] : no abdominal mass palpated [Abnormal Walk] : normal gait [Gait - Sufficient For Exercise Testing] : the gait was sufficient for exercise testing [Nail Clubbing] : no clubbing of the fingernails [Cyanosis, Localized] : no localized cyanosis [Petechial Hemorrhages (___cm)] : no petechial hemorrhages [Skin Color & Pigmentation] : normal skin color and pigmentation [] : no rash [No Venous Stasis] : no venous stasis [Skin Lesions] : no skin lesions [No Skin Ulcers] : no skin ulcer [No Xanthoma] : no  xanthoma was observed [Oriented To Time, Place, And Person] : oriented to person, place, and time [Affect] : the affect was normal [Mood] : the mood was normal [No Anxiety] : not feeling anxious [FreeTextEntry1] : strong pedal pulses.  normal allens test bilaterally. strong radial pulse.  no subclavian bruits.  no carotid bruits

## 2023-03-06 NOTE — PHYSICAL EXAM
[Well Developed] : well developed [Well Nourished] : well nourished [No Acute Distress] : no acute distress [Normal Venous Pressure] : normal venous pressure [Normal Conjunctiva] : normal conjunctiva [No Carotid Bruit] : no carotid bruit [Normal S1, S2] : normal S1, S2 [No Rub] : no rub [No Murmur] : no murmur [No Gallop] : no gallop [Clear Lung Fields] : clear lung fields [Good Air Entry] : good air entry [No Respiratory Distress] : no respiratory distress  [Soft] : abdomen soft [Non Tender] : non-tender [No Masses/organomegaly] : no masses/organomegaly [Normal Bowel Sounds] : normal bowel sounds [Normal Gait] : normal gait [No Edema] : no edema [No Clubbing] : no clubbing [No Varicosities] : no varicosities [No Rash] : no rash [No Skin Lesions] : no skin lesions [Moves all extremities] : moves all extremities [No Focal Deficits] : no focal deficits [Normal Speech] : normal speech [Alert and Oriented] : alert and oriented [Normal memory] : normal memory [de-identified] : purplish feet

## 2023-03-07 LAB
ANION GAP SERPL CALC-SCNC: 14 MMOL/L
BUN SERPL-MCNC: 31 MG/DL
CALCIUM SERPL-MCNC: 9.4 MG/DL
CHLORIDE SERPL-SCNC: 96 MMOL/L
CO2 SERPL-SCNC: 30 MMOL/L
CREAT SERPL-MCNC: 1.37 MG/DL
EGFR: 37 ML/MIN/1.73M2
GLUCOSE SERPL-MCNC: 95 MG/DL
NT-PROBNP SERPL-MCNC: 2710 PG/ML
POTASSIUM SERPL-SCNC: 3.8 MMOL/L
SODIUM SERPL-SCNC: 140 MMOL/L
TSH SERPL-ACNC: 13.1 UIU/ML

## 2023-04-03 ENCOUNTER — LABORATORY RESULT (OUTPATIENT)
Age: 88
End: 2023-04-03

## 2023-04-04 ENCOUNTER — APPOINTMENT (OUTPATIENT)
Dept: NEPHROLOGY | Facility: CLINIC | Age: 88
End: 2023-04-04
Payer: MEDICARE

## 2023-04-04 VITALS
OXYGEN SATURATION: 95 % | TEMPERATURE: 97.2 F | HEIGHT: 67 IN | HEART RATE: 102 BPM | SYSTOLIC BLOOD PRESSURE: 144 MMHG | WEIGHT: 131.8 LBS | BODY MASS INDEX: 20.69 KG/M2 | DIASTOLIC BLOOD PRESSURE: 86 MMHG

## 2023-04-04 VITALS
SYSTOLIC BLOOD PRESSURE: 144 MMHG | OXYGEN SATURATION: 96 % | DIASTOLIC BLOOD PRESSURE: 86 MMHG | HEIGHT: 67 IN | HEART RATE: 98 BPM | WEIGHT: 131 LBS | BODY MASS INDEX: 20.56 KG/M2 | RESPIRATION RATE: 15 BRPM

## 2023-04-04 LAB
ANION GAP SERPL CALC-SCNC: 25 MMOL/L
BUN SERPL-MCNC: 32 MG/DL
CALCIUM SERPL-MCNC: 9.9 MG/DL
CHLORIDE SERPL-SCNC: 95 MMOL/L
CO2 SERPL-SCNC: 21 MMOL/L
CREAT SERPL-MCNC: 1.32 MG/DL
EGFR: 39 ML/MIN/1.73M2
GLUCOSE SERPL-MCNC: 105 MG/DL
NT-PROBNP SERPL-MCNC: 2171 PG/ML
POTASSIUM SERPL-SCNC: 4.2 MMOL/L
SODIUM SERPL-SCNC: 140 MMOL/L

## 2023-04-04 PROCEDURE — 99203 OFFICE O/P NEW LOW 30 MIN: CPT

## 2023-04-05 LAB — TSH SERPL-ACNC: 8.77 UIU/ML

## 2023-04-19 ENCOUNTER — APPOINTMENT (OUTPATIENT)
Dept: ENDOCRINOLOGY | Facility: CLINIC | Age: 88
End: 2023-04-19

## 2023-04-24 NOTE — PHYSICAL EXAM
A (SYSTEM DELIVERY EVOLUT FX 23-29 MM) delivery catheter was removed. [Well Nourished] : well nourished [Well Developed] : well developed [Well-Appearing] : well-appearing [Normal Sclera/Conjunctiva] : normal sclera/conjunctiva [PERRL] : pupils equal round and reactive to light [EOMI] : extraocular movements intact [Normal Outer Ear/Nose] : the outer ears and nose were normal in appearance [Normal Oropharynx] : the oropharynx was normal [No JVD] : no jugular venous distention [No Lymphadenopathy] : no lymphadenopathy [Supple] : supple [Thyroid Normal, No Nodules] : the thyroid was normal and there were no nodules present [No Respiratory Distress] : no respiratory distress  [No Accessory Muscle Use] : no accessory muscle use [Clear to Auscultation] : lungs were clear to auscultation bilaterally [Normal Rate] : normal rate  [Regular Rhythm] : with a regular rhythm [Normal S1, S2] : normal S1 and S2 [No Murmur] : no murmur heard [No Carotid Bruits] : no carotid bruits [No Abdominal Bruit] : a ~M bruit was not heard ~T in the abdomen [No Varicosities] : no varicosities [Pedal Pulses Present] : the pedal pulses are present [No Edema] : there was no peripheral edema [No Palpable Aorta] : no palpable aorta [No Extremity Clubbing/Cyanosis] : no extremity clubbing/cyanosis [Soft] : abdomen soft [Non Tender] : non-tender [Non-distended] : non-distended [No Masses] : no abdominal mass palpated [No HSM] : no HSM [Normal Bowel Sounds] : normal bowel sounds [Normal Posterior Cervical Nodes] : no posterior cervical lymphadenopathy [Normal Anterior Cervical Nodes] : no anterior cervical lymphadenopathy [No CVA Tenderness] : no CVA  tenderness [No Spinal Tenderness] : no spinal tenderness [No Joint Swelling] : no joint swelling [Grossly Normal Strength/Tone] : grossly normal strength/tone [No Rash] : no rash [Coordination Grossly Intact] : coordination grossly intact [No Focal Deficits] : no focal deficits [Normal Gait] : normal gait [Normal Affect] : the affect was normal [Normal Insight/Judgement] : insight and judgment were intact [FreeTextEntry1] : DONE BY HER GYN , SEE NOTE. [de-identified] : PARONYCHIA L MIDDLE FINGER

## 2023-04-27 ENCOUNTER — APPOINTMENT (OUTPATIENT)
Dept: NEPHROLOGY | Facility: CLINIC | Age: 88
End: 2023-04-27
Payer: MEDICARE

## 2023-04-27 VITALS
BODY MASS INDEX: 20.13 KG/M2 | DIASTOLIC BLOOD PRESSURE: 96 MMHG | SYSTOLIC BLOOD PRESSURE: 168 MMHG | TEMPERATURE: 95.7 F | HEART RATE: 90 BPM | OXYGEN SATURATION: 96 % | HEIGHT: 66.5 IN | WEIGHT: 126.76 LBS

## 2023-04-27 VITALS — DIASTOLIC BLOOD PRESSURE: 76 MMHG | SYSTOLIC BLOOD PRESSURE: 146 MMHG

## 2023-04-27 DIAGNOSIS — N18.32 CHRONIC KIDNEY DISEASE, STAGE 3B: ICD-10-CM

## 2023-04-27 PROCEDURE — 99213 OFFICE O/P EST LOW 20 MIN: CPT

## 2023-04-27 NOTE — ASSESSMENT
[FreeTextEntry1] : 1. 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  Mostly right side chronic systolic dysfunction secondary to pulmonary hypertension. Cause of pulmonary hypertension unclear. Dilated left atrium but only mild mitral regurgitation w/ normal LV function.  \par 2. Edema and pleural effusion. At present on 40 mg of Furosemide every day except for 80 mg twice a week.  I have started her on Spironolactone 25 mg and asked her to take 40 mg Furosemide every morning and weigh herself every morning and keep a record of her daily weight. \par 3. Atrial fibrillation: on Eliquis.\par 4. Dyspnea on exertion.  May benefit from pulmonary consultation for evaluation and treatment of pulmonary hypertension (vasodilators?).\par 5. Chronic anxiety w/ insomnia related to restless leg syndrome. To take Clonazepam 1 mg at night and 0.5 mg IF NEEDED in the morning. \par Will call the patient in one week to assess if diuretic regimen needs to be titrated.

## 2023-04-27 NOTE — REVIEW OF SYSTEMS
[Constipation] : constipation [As Noted in HPI] : as noted in HPI [Anxiety] : anxiety [Depression] : depression [Negative] : Endocrine [Fever] : no fever [Chills] : no chills

## 2023-04-27 NOTE — HISTORY OF PRESENT ILLNESS
[FreeTextEntry1] : Initial visit on April 4.  The patient has been taking 80 mg of Furosemide and 25 mg of Spironolactone as discussed.  Although she has noted only a few pounds of weight loss, she states that he legs are much less swollen and that she can to up the stairs in her house w/ minimal dyspnea.  She remains depressed. The two major issues are chronic constipation (take MiraLAX prn) and insomnia w/ arthralgias.

## 2023-04-27 NOTE — REASON FOR VISIT
[Consultation] : a consultation visit [Family Member] : family member [FreeTextEntry1] : Referred for evaluation of elevated creatinine.

## 2023-04-27 NOTE — ASSESSMENT
[FreeTextEntry1] : We addressed the two initial complaints:\par 1. Chronic constipation: the patient will start Senokot 2 tabs at night every night. If she feels that stool is present in the rectum but she cannot move her bowel, she will use Glycerin suppositories.  If she does not move her bowel for 2 days she will use Miralax.\par 2. Arthralgias and insomnia: Tylenol PM \par Then we addressed her chronic issues.\par 3. Hypertensive Heart disease: Pleural effusion and edema less and exertional dyspnea better. Continue present dose of Furosemide and Spironolactone. \par 4. Stage 3b CKD will check labs today including 5. TSH.\par 6. Atrial Fibrillation: rate controlled.\par Will call the patient with results when available. \par Nest visit will be a telephone visit. \par I have discussed the above with the patient while the patient's daughter was on a speaker phone as requested by the patient.

## 2023-04-27 NOTE — PHYSICAL EXAM
[General Appearance - Alert] : alert [Sclera] : the sclera and conjunctiva were normal [Hearing Threshold Finger Rub Not [place]] : hearing was normal [Jugular Venous Distention Increased] : there was no jugular-venous distention [Irregularly Irregular] : the rhythm was irregularly irregular [Systolic grade ___/6] : A grade [unfilled]/6 systolic murmur was heard. [Pitting Edema] : pitting edema present [___ +] : bilateral [unfilled]+ pitting edema to the knees [Abdomen Tenderness] : non-tender [Urinary Bladder Findings] : the bladder was normal on palpation [No Spinal Tenderness] : no spinal tenderness [Abnormal Walk] : normal gait [] : no rash [Oriented To Time, Place, And Person] : oriented to person, place, and time [FreeTextEntry1] : Bilateral pleural effusion right up to mid lung field [No Focal Deficits] : no focal deficits

## 2023-04-27 NOTE — CONSULT LETTER
[Dear  ___] : Dear  [unfilled], [Consult Letter:] : I had the pleasure of evaluating your patient, [unfilled]. [Please see my note below.] : Please see my note below. [Consult Closing:] : Thank you very much for allowing me to participate in the care of this patient.  If you have any questions, please do not hesitate to contact me. [Sincerely,] : Sincerely, [FreeTextEntry2] : Dr. Sidra Munoz [FreeTextEntry3] : Baron Mccrary MD

## 2023-04-27 NOTE — PHYSICAL EXAM
[General Appearance - Alert] : alert [Sclera] : the sclera and conjunctiva were normal [Hearing Threshold Finger Rub Not [place]] : hearing was normal [Jugular Venous Distention Increased] : there was no jugular-venous distention [Irregularly Irregular] : the rhythm was irregularly irregular [Systolic grade ___/6] : A grade [unfilled]/6 systolic murmur was heard. [Edema] : there was no peripheral edema [Abdomen Tenderness] : non-tender [Urinary Bladder Findings] : the bladder was normal on palpation [No Spinal Tenderness] : no spinal tenderness [Abnormal Walk] : normal gait [] : no rash [No Focal Deficits] : no focal deficits [Oriented To Time, Place, And Person] : oriented to person, place, and time [FreeTextEntry1] : Basilar dullness much less. Only on the right side.

## 2023-04-27 NOTE — REVIEW OF SYSTEMS
[Feeling Poorly] : feeling poorly [SOB on Exertion] : shortness of breath during exertion [Constipation] : constipation [Arthralgias] : arthralgias [Depression] : depression [Negative] : Neurological

## 2023-04-27 NOTE — HISTORY OF PRESENT ILLNESS
[FreeTextEntry1] : Chart reviewed, daughter and patient are good historian, recent admission to [facility] in February reviewed also.\par The patient emigrated from [place] with her  at the age of 25. Her  became sick and eventually passed away.  Needed to start to work and learn the language. Long standing history of anxiety. One daughter who has no children and lost her .  The daughter lives in [facility] and at time comes to accompany her mother to doctor's visit.  Otherwise a "younger" friend helps her with transportation.   The patient was in her usual state of health (anxiety and essential hypertension) until December when she developed atrial fibrillation.  Was on Amiodarone but developed liver and kidney toxicity beside hypothyroidism.  She is now off this medication. She was admitted to [facility] in February for edema and bluish discoloration of the left extremity. Doppler was negative for DVT.  ECHO showed normal LV function and thickness, mild mitral regurgitation, moderately enlarged left atrium, high pulmonary artery pressure w/ RV systolic dysfunction, moderate severe TR.  Chest Xray showed  bilateral pleural effusions right larger than left. The patient endorses significant exertional dyspnea and limitation in the activity of daily living. She lives   alone.  Early April the BNP was over 2100, the BUN and creatinine were 32 and 1.32 respectively, the serum K was 4.2.  Her current medications at home were reviewed and reconciled. Noted is the high TSH and the recent increase in the dose of Synthroid to 50 mcg in the morning on an empty stomach.   The purpose of today's visit is to address the renal insufficiency (eGFR of 39) in the setting of the patient cardiopulmonary syndrome.

## 2023-04-28 LAB
BASOPHILS # BLD AUTO: 0.06 K/UL
BASOPHILS NFR BLD AUTO: 0.7 %
CHOLEST SERPL-MCNC: 208 MG/DL
EOSINOPHIL # BLD AUTO: 0.07 K/UL
EOSINOPHIL NFR BLD AUTO: 0.8 %
ESTIMATED AVERAGE GLUCOSE: 126 MG/DL
HBA1C MFR BLD HPLC: 6 %
HCT VFR BLD CALC: 52.2 %
HDLC SERPL-MCNC: 85 MG/DL
HGB BLD-MCNC: 17.4 G/DL
IMM GRANULOCYTES NFR BLD AUTO: 0.5 %
LDLC SERPL CALC-MCNC: 97 MG/DL
LYMPHOCYTES # BLD AUTO: 1.4 K/UL
LYMPHOCYTES NFR BLD AUTO: 16.4 %
MAN DIFF?: NORMAL
MCHC RBC-ENTMCNC: 31.4 PG
MCHC RBC-ENTMCNC: 33.3 GM/DL
MCV RBC AUTO: 94.2 FL
MONOCYTES # BLD AUTO: 0.67 K/UL
MONOCYTES NFR BLD AUTO: 7.8 %
NEUTROPHILS # BLD AUTO: 6.3 K/UL
NEUTROPHILS NFR BLD AUTO: 73.8 %
NONHDLC SERPL-MCNC: 123 MG/DL
NT-PROBNP SERPL-MCNC: 1427 PG/ML
PLATELET # BLD AUTO: 213 K/UL
RBC # BLD: 5.54 M/UL
RBC # FLD: 14.9 %
TRIGL SERPL-MCNC: 131 MG/DL
TSH SERPL-ACNC: 7.48 UIU/ML
WBC # FLD AUTO: 8.54 K/UL

## 2023-05-03 ENCOUNTER — NON-APPOINTMENT (OUTPATIENT)
Age: 88
End: 2023-05-03

## 2023-05-03 LAB
ALBUMIN SERPL ELPH-MCNC: 4.6 G/DL
ALP BLD-CCNC: 109 U/L
ALT SERPL-CCNC: 30 U/L
ANION GAP SERPL CALC-SCNC: 16 MMOL/L
AST SERPL-CCNC: 37 U/L
BILIRUB SERPL-MCNC: 0.9 MG/DL
BUN SERPL-MCNC: 45 MG/DL
CALCIUM SERPL-MCNC: 10 MG/DL
CHLORIDE SERPL-SCNC: 89 MMOL/L
CO2 SERPL-SCNC: 26 MMOL/L
CREAT SERPL-MCNC: 1.38 MG/DL
EGFR: 37 ML/MIN/1.73M2
GLUCOSE SERPL-MCNC: 84 MG/DL
POTASSIUM SERPL-SCNC: 4.9 MMOL/L
PROT SERPL-MCNC: 7.5 G/DL
SODIUM SERPL-SCNC: 130 MMOL/L

## 2023-05-08 ENCOUNTER — NON-APPOINTMENT (OUTPATIENT)
Age: 88
End: 2023-05-08

## 2023-05-08 ENCOUNTER — APPOINTMENT (OUTPATIENT)
Dept: CARDIOLOGY | Facility: CLINIC | Age: 88
End: 2023-05-08
Payer: MEDICARE

## 2023-05-08 VITALS
DIASTOLIC BLOOD PRESSURE: 78 MMHG | TEMPERATURE: 97.2 F | SYSTOLIC BLOOD PRESSURE: 159 MMHG | HEART RATE: 87 BPM | BODY MASS INDEX: 20.99 KG/M2 | WEIGHT: 132 LBS | OXYGEN SATURATION: 97 % | RESPIRATION RATE: 14 BRPM

## 2023-05-08 DIAGNOSIS — I34.0 NONRHEUMATIC MITRAL (VALVE) INSUFFICIENCY: ICD-10-CM

## 2023-05-08 PROCEDURE — 93000 ELECTROCARDIOGRAM COMPLETE: CPT

## 2023-05-08 PROCEDURE — 99213 OFFICE O/P EST LOW 20 MIN: CPT | Mod: 25

## 2023-05-08 NOTE — PHYSICAL EXAM
[Well Developed] : well developed [Well Nourished] : well nourished [No Acute Distress] : no acute distress [Normal Conjunctiva] : normal conjunctiva [Normal Venous Pressure] : normal venous pressure [No Carotid Bruit] : no carotid bruit [Normal S1, S2] : normal S1, S2 [No Murmur] : no murmur [No Rub] : no rub [No Gallop] : no gallop [Clear Lung Fields] : clear lung fields [Good Air Entry] : good air entry [No Respiratory Distress] : no respiratory distress  [Soft] : abdomen soft [No Masses/organomegaly] : no masses/organomegaly [Non Tender] : non-tender [Normal Bowel Sounds] : normal bowel sounds [Normal Gait] : normal gait [No Edema] : no edema [No Clubbing] : no clubbing [No Rash] : no rash [No Varicosities] : no varicosities [No Skin Lesions] : no skin lesions [Moves all extremities] : moves all extremities [No Focal Deficits] : no focal deficits [Normal Speech] : normal speech [Normal memory] : normal memory [Alert and Oriented] : alert and oriented [de-identified] : purplish feet

## 2023-05-08 NOTE — REVIEW OF SYSTEMS
[Weight Loss (___ Lbs)] : [unfilled] ~Ulb weight loss [SOB] : shortness of breath [Dyspnea on exertion] : dyspnea during exertion [Negative] : Heme/Lymph [Chest Discomfort] : no chest discomfort [Lower Ext Edema] : no extremity edema [Palpitations] : no palpitations

## 2023-05-08 NOTE — HISTORY OF PRESENT ILLNESS
[FreeTextEntry1] : Gia is feeling better and can go up stairs better. Still SOB on exertion. Lasix increased to 80mg bid every day. BP better since 4/15 according to home log. Daughter wants to bring her to [place] for a week.

## 2023-05-08 NOTE — DISCUSSION/SUMMARY
[FreeTextEntry1] : The patient is an 88-year-old anxious female HTN, HLD, HH ,persistent A-fib with RV dysfunction, CKD who is improving\par #1 Afib- c/w eliquis 2.5mg bid, c/w  toprol 50mg, \par #2 HFrEF- RV failure, c/w lasix 40mg bid, losartan, and spironolactone 25mg for now and f/u Dr. Mccrary\par #3 HLD- c/w simvastatin 20mg\par #4 Thyroid- c/w levothyroxine, check TSH\par #5 Anxiety- on clonazepam, stress management and discuss with daughter getting help at home. \par #6 PAD- discoloration secondary to varicose veins, compression stockings, f/u Dr. Tinoco\par Daughter lives in [facility] and no contraindications to travelling there for the weekend.\par  [EKG obtained to assist in diagnosis and management of assessed problem(s)] : EKG obtained to assist in diagnosis and management of assessed problem(s)

## 2023-05-09 ENCOUNTER — APPOINTMENT (OUTPATIENT)
Dept: INTERNAL MEDICINE | Facility: CLINIC | Age: 88
End: 2023-05-09
Payer: MEDICARE

## 2023-05-09 VITALS
HEART RATE: 84 BPM | OXYGEN SATURATION: 97 % | SYSTOLIC BLOOD PRESSURE: 121 MMHG | RESPIRATION RATE: 14 BRPM | BODY MASS INDEX: 20.99 KG/M2 | TEMPERATURE: 97.3 F | DIASTOLIC BLOOD PRESSURE: 73 MMHG | WEIGHT: 132 LBS

## 2023-05-09 PROCEDURE — 99214 OFFICE O/P EST MOD 30 MIN: CPT

## 2023-05-09 RX ORDER — FUROSEMIDE 80 MG/1
80 TABLET ORAL
Qty: 24 | Refills: 3 | Status: DISCONTINUED | COMMUNITY
Start: 2023-03-07 | End: 2023-05-09

## 2023-05-10 NOTE — REVIEW OF SYSTEMS
[Dyspnea on Exertion] : dyspnea on exertion [Negative] : Heme/Lymph [FreeTextEntry9] : muscle cramps  [de-identified] : interrupted sleep pattern

## 2023-05-10 NOTE — PHYSICAL EXAM
[No Acute Distress] : no acute distress [Normal Sclera/Conjunctiva] : normal sclera/conjunctiva [EOMI] : extraocular movements intact [Normal Outer Ear/Nose] : the outer ears and nose were normal in appearance [Normal Oropharynx] : the oropharynx was normal [No JVD] : no jugular venous distention [No Lymphadenopathy] : no lymphadenopathy [Supple] : supple [No Respiratory Distress] : no respiratory distress  [No Accessory Muscle Use] : no accessory muscle use [Clear to Auscultation] : lungs were clear to auscultation bilaterally [Normal Rate] : normal rate  [Regular Rhythm] : with a regular rhythm [Normal S1, S2] : normal S1 and S2 [No Murmur] : no murmur heard [No Carotid Bruits] : no carotid bruits [Pedal Pulses Present] : the pedal pulses are present [No Extremity Clubbing/Cyanosis] : no extremity clubbing/cyanosis [Soft] : abdomen soft [Non Tender] : non-tender [Non-distended] : non-distended [No Masses] : no abdominal mass palpated [No HSM] : no HSM [Normal Bowel Sounds] : normal bowel sounds [No CVA Tenderness] : no CVA  tenderness [No Spinal Tenderness] : no spinal tenderness [Kyphosis] : kyphosis [No Joint Swelling] : no joint swelling [Grossly Normal Strength/Tone] : grossly normal strength/tone [No Rash] : no rash [Coordination Grossly Intact] : coordination grossly intact [No Focal Deficits] : no focal deficits [Normal Gait] : normal gait [Normal Affect] : the affect was normal [Normal Insight/Judgement] : insight and judgment were intact [No Edema] : there was no peripheral edema [de-identified] :  + spider/Varicose veins,

## 2023-05-10 NOTE — PLAN
[FreeTextEntry1] : Follow- up\par \par Muscle cramps \par Magnesium 400 mg daily \par Increase Po fluid intake \par \par Hypothyroidism \par increase Levothyroxine to 75 mcg daily \par Repeat TSH in 1 month \par \par  Elevated Creatinine \par Following with Nephrology - 5/16/2023 \par \par \par HTN/HLD/Afib \par Follows with Dr. Munoz \par cont Losartan 25 mg daily \par cont Metoprolol 50 mg daily \par cont Lasix 40 mg twice daily \par cont Eliquis 2.5 mg twice daily \par cont Simvastatin 20 mg daily \par cont Spironolactone 25 mg daily \par \par Pt declined bone density \par need to repeat BMP in 1-2 weeks due to Hypo Na \par \par \par

## 2023-05-10 NOTE — HISTORY OF PRESENT ILLNESS
[de-identified] : Patient is a 88 year old female accompanied by daughter with history of HTN, HLD, anxiety, presents for follow-up \par \par She reports that she was experiencing constipation and was seen by GI Dr. Mccrary and was told to take Senna and suppository. She states that she has been moving her bowels regularly however, now she is suffering with gas pain. She has stopped the senna and suppositories. \par Also she reports for the last couple of weeks she has been having interrupted sleep patterns. She reports awakening with leg cramping and tingling sensation. She reports she is on water pill and she tries to stay hydrated \par Denies any CP, SOB, HA, Dizziness, N/V

## 2023-06-14 LAB — TSH SERPL-ACNC: 3.32 UIU/ML

## 2023-07-21 ENCOUNTER — RX RENEWAL (OUTPATIENT)
Age: 88
End: 2023-07-21

## 2023-07-21 RX ORDER — APIXABAN 2.5 MG/1
2.5 TABLET, FILM COATED ORAL
Qty: 180 | Refills: 3 | Status: ACTIVE | COMMUNITY
Start: 2022-12-05 | End: 1900-01-01

## 2023-07-28 ENCOUNTER — RX RENEWAL (OUTPATIENT)
Age: 88
End: 2023-07-28

## 2023-08-03 ENCOUNTER — APPOINTMENT (OUTPATIENT)
Dept: INTERNAL MEDICINE | Facility: CLINIC | Age: 88
End: 2023-08-03
Payer: MEDICARE

## 2023-08-03 ENCOUNTER — NON-APPOINTMENT (OUTPATIENT)
Age: 88
End: 2023-08-03

## 2023-08-03 ENCOUNTER — APPOINTMENT (OUTPATIENT)
Dept: CARDIOLOGY | Facility: CLINIC | Age: 88
End: 2023-08-03
Payer: MEDICARE

## 2023-08-03 VITALS
HEART RATE: 90 BPM | SYSTOLIC BLOOD PRESSURE: 132 MMHG | WEIGHT: 131 LBS | RESPIRATION RATE: 12 BRPM | SYSTOLIC BLOOD PRESSURE: 122 MMHG | DIASTOLIC BLOOD PRESSURE: 78 MMHG | DIASTOLIC BLOOD PRESSURE: 78 MMHG | OXYGEN SATURATION: 97 % | BODY MASS INDEX: 20.8 KG/M2 | HEIGHT: 66.5 IN

## 2023-08-03 DIAGNOSIS — L98.9 DISORDER OF THE SKIN AND SUBCUTANEOUS TISSUE, UNSPECIFIED: ICD-10-CM

## 2023-08-03 DIAGNOSIS — K59.09 OTHER CONSTIPATION: ICD-10-CM

## 2023-08-03 PROCEDURE — 93000 ELECTROCARDIOGRAM COMPLETE: CPT

## 2023-08-03 PROCEDURE — 99214 OFFICE O/P EST MOD 30 MIN: CPT | Mod: 25

## 2023-08-03 PROCEDURE — 99214 OFFICE O/P EST MOD 30 MIN: CPT

## 2023-08-03 NOTE — PHYSICAL EXAM
[No Acute Distress] : no acute distress [Well Nourished] : well nourished [Well Developed] : well developed [Well-Appearing] : well-appearing [Normal Sclera/Conjunctiva] : normal sclera/conjunctiva [PERRL] : pupils equal round and reactive to light [EOMI] : extraocular movements intact [Normal Outer Ear/Nose] : the outer ears and nose were normal in appearance [Normal Oropharynx] : the oropharynx was normal [Normal TMs] : both tympanic membranes were normal [No JVD] : no jugular venous distention [No Lymphadenopathy] : no lymphadenopathy [Supple] : supple [No Respiratory Distress] : no respiratory distress  [No Accessory Muscle Use] : no accessory muscle use [Clear to Auscultation] : lungs were clear to auscultation bilaterally [Normal Rate] : normal rate  [Regular Rhythm] : with a regular rhythm [Normal S1, S2] : normal S1 and S2 [No Murmur] : no murmur heard [No Carotid Bruits] : no carotid bruits [Pedal Pulses Present] : the pedal pulses are present [No Edema] : there was no peripheral edema [Soft] : abdomen soft [Non Tender] : non-tender [Non-distended] : non-distended [Normal Bowel Sounds] : normal bowel sounds [Normal Posterior Cervical Nodes] : no posterior cervical lymphadenopathy [Normal Anterior Cervical Nodes] : no anterior cervical lymphadenopathy [No CVA Tenderness] : no CVA  tenderness [No Spinal Tenderness] : no spinal tenderness [No Joint Swelling] : no joint swelling [Grossly Normal Strength/Tone] : grossly normal strength/tone [No Rash] : no rash [Coordination Grossly Intact] : coordination grossly intact [No Focal Deficits] : no focal deficits [Normal Gait] : normal gait [Deep Tendon Reflexes (DTR)] : deep tendon reflexes were 2+ and symmetric [Normal Affect] : the affect was normal [Normal Insight/Judgement] : insight and judgment were intact [de-identified] : + few erythematous skin lesion mid back and lower extremities

## 2023-08-03 NOTE — HISTORY OF PRESENT ILLNESS
[de-identified] : Gia Gillespie is an 89-year-old female accompanied by daughter with history of HTN, HLD, anxiety, who presents for a follow-up visit.  Pt. states she had covid-19 with mild symptoms in 05/10/2023, pt. was vaccinated with Jason and Jason and two Pfizer booster at the time. Pt. states she began taking red spots on her legs that began over a week ago. Also, c/o intermittent  pain in the upper left side of her back that began 2-3 weeks ago. Pt. is unaware of any movement in particular that may have caused the pain. Denies taking and topical or analgesic medications to alleviate the pain. Pt. states this morning by mistake she took Eliquis  2.5 mg twice. C/o abdominal pain, states she isn't able to move her bowels every day, is not sure if that's why she has abdominal pain, denies N, V, D, blood in stool  Denies CP, N/V/D, lightheadedness, or dizziness.

## 2023-08-03 NOTE — REVIEW OF SYSTEMS
[Abdominal Pain] : no abdominal pain [Nausea] : no nausea [Constipation] : constipation [Vomiting] : no vomiting [Back Pain] : back pain [Negative] : Heme/Lymph

## 2023-08-03 NOTE — PLAN
[FreeTextEntry1] : see plan   Hypothyroidism   Levothyroxine to 75 mcg daily  Repeat TSH today    Elevated Creatinine  Following with Nephrology - 5/16/2023    HTN/HLD/Afib  Follows with Dr. Munoz  cont Losartan 25 mg daily  cont Metoprolol 50 mg daily  cont Lasix 40 mg twice daily  cont Eliquis 2.5 mg twice daily  cont Simvastatin 20 mg daily  cont Spironolactone 25 mg daily   Constipation/ Abd bloating  diet d/w pt Miralax or Colace prn GI referral

## 2023-08-03 NOTE — HEALTH RISK ASSESSMENT
[0] : 1) Little interest or pleasure doing things: Not at all (0) [1] : 2) Feeling down, depressed, or hopeless for several days (1) [PHQ-2 Negative - No further assessment needed] : PHQ-2 Negative - No further assessment needed [Never] : Never [[id number]] : 1

## 2023-08-04 LAB — NT-PROBNP SERPL-MCNC: 3720 PG/ML

## 2023-08-05 NOTE — DISCUSSION/SUMMARY
[FreeTextEntry1] : The patient is an 89-year-old anxious female HTN, HLD, HH ,persistent A-fib with RV dysfunction, CKD who is in better spirits. #1 Afib- c/w eliquis 2.5mg bid, c/w  toprol 50mg,  #2 HFrEF- RV failure, c/w lasix 40mg bid, losartan, and spironolactone 25mg  and f/u Dr. Mccrary, Labs today #3 HLD- c/w simvastatin 20mg #4 Thyroid- c/w levothyroxine, check TSH #5 Anxiety- on clonazepam, stress management and discuss with daughter getting help at home.  #6 PAD- discoloration secondary to varicose veins, compression stockings, f/u Dr. Tinoco Daughter lives in [facility].  [EKG obtained to assist in diagnosis and management of assessed problem(s)] : EKG obtained to assist in diagnosis and management of assessed problem(s)

## 2023-08-05 NOTE — HISTORY OF PRESENT ILLNESS
[FreeTextEntry1] : Gia continues to be independent. Daughter here from [facility]. Unclear if complaint with all meds every day.

## 2023-08-05 NOTE — PHYSICAL EXAM
[Well Developed] : well developed [Well Nourished] : well nourished [No Acute Distress] : no acute distress [Normal Conjunctiva] : normal conjunctiva [Normal Venous Pressure] : normal venous pressure [No Carotid Bruit] : no carotid bruit [Normal S1, S2] : normal S1, S2 [No Murmur] : no murmur [No Rub] : no rub [No Gallop] : no gallop [Clear Lung Fields] : clear lung fields [Good Air Entry] : good air entry [No Respiratory Distress] : no respiratory distress  [Soft] : abdomen soft [Non Tender] : non-tender [No Masses/organomegaly] : no masses/organomegaly [Normal Bowel Sounds] : normal bowel sounds [Normal Gait] : normal gait [No Edema] : no edema [No Clubbing] : no clubbing [No Varicosities] : no varicosities [No Rash] : no rash [No Skin Lesions] : no skin lesions [Moves all extremities] : moves all extremities [No Focal Deficits] : no focal deficits [Normal Speech] : normal speech [Alert and Oriented] : alert and oriented [Normal memory] : normal memory [de-identified] : purplish feet

## 2023-08-05 NOTE — REVIEW OF SYSTEMS
Along with  we spoke with pt's daughter.  She would like to discuss it more with family and the financial dept before scheduling appt.    Eri   [Weight Loss (___ Lbs)] : [unfilled] ~Ulb weight loss [SOB] : shortness of breath [Dyspnea on exertion] : dyspnea during exertion [Negative] : Heme/Lymph [Chest Discomfort] : no chest discomfort [Lower Ext Edema] : no extremity edema [Palpitations] : no palpitations

## 2023-08-07 LAB
ALBUMIN SERPL ELPH-MCNC: 4.5 G/DL
ALP BLD-CCNC: 97 U/L
ALT SERPL-CCNC: 32 U/L
ANION GAP SERPL CALC-SCNC: 15 MMOL/L
APPEARANCE: CLEAR
AST SERPL-CCNC: 39 U/L
BILIRUB SERPL-MCNC: 1 MG/DL
BILIRUBIN URINE: NEGATIVE
BLOOD URINE: NEGATIVE
BUN SERPL-MCNC: 39 MG/DL
CALCIUM SERPL-MCNC: 10 MG/DL
CHLORIDE SERPL-SCNC: 89 MMOL/L
CO2 SERPL-SCNC: 25 MMOL/L
COLOR: YELLOW
CREAT SERPL-MCNC: 1.53 MG/DL
EGFR: 32 ML/MIN/1.73M2
ESTIMATED AVERAGE GLUCOSE: 126 MG/DL
GLUCOSE QUALITATIVE U: NEGATIVE MG/DL
GLUCOSE SERPL-MCNC: 88 MG/DL
HBA1C MFR BLD HPLC: 6 %
KETONES URINE: NEGATIVE MG/DL
LEUKOCYTE ESTERASE URINE: NEGATIVE
NITRITE URINE: NEGATIVE
PH URINE: 6.5
POTASSIUM SERPL-SCNC: 5.1 MMOL/L
PROT SERPL-MCNC: 7.4 G/DL
PROTEIN URINE: NEGATIVE MG/DL
SODIUM SERPL-SCNC: 129 MMOL/L
SPECIFIC GRAVITY URINE: 1.01
TSH SERPL-ACNC: 0.99 UIU/ML
UROBILINOGEN URINE: 0.2 MG/DL

## 2023-08-11 ENCOUNTER — LABORATORY RESULT (OUTPATIENT)
Age: 88
End: 2023-08-11

## 2023-08-17 ENCOUNTER — LABORATORY RESULT (OUTPATIENT)
Age: 88
End: 2023-08-17

## 2023-08-31 ENCOUNTER — LABORATORY RESULT (OUTPATIENT)
Age: 88
End: 2023-08-31

## 2023-09-12 ENCOUNTER — APPOINTMENT (OUTPATIENT)
Dept: NEPHROLOGY | Facility: CLINIC | Age: 88
End: 2023-09-12
Payer: MEDICARE

## 2023-09-12 VITALS
WEIGHT: 122 LBS | BODY MASS INDEX: 23.95 KG/M2 | SYSTOLIC BLOOD PRESSURE: 138 MMHG | HEIGHT: 60 IN | OXYGEN SATURATION: 95 % | DIASTOLIC BLOOD PRESSURE: 82 MMHG | HEART RATE: 113 BPM | TEMPERATURE: 97.1 F

## 2023-09-12 PROCEDURE — 99213 OFFICE O/P EST LOW 20 MIN: CPT

## 2023-09-13 RX ORDER — METOPROLOL SUCCINATE 50 MG/1
50 TABLET, EXTENDED RELEASE ORAL DAILY
Qty: 90 | Refills: 3 | Status: DISCONTINUED | COMMUNITY
Start: 2022-01-13 | End: 2023-09-13

## 2023-10-19 ENCOUNTER — APPOINTMENT (OUTPATIENT)
Dept: INTERNAL MEDICINE | Facility: CLINIC | Age: 88
End: 2023-10-19
Payer: MEDICARE

## 2023-10-19 ENCOUNTER — APPOINTMENT (OUTPATIENT)
Dept: CARDIOLOGY | Facility: CLINIC | Age: 88
End: 2023-10-19
Payer: MEDICARE

## 2023-10-19 ENCOUNTER — NON-APPOINTMENT (OUTPATIENT)
Age: 88
End: 2023-10-19

## 2023-10-19 VITALS
OXYGEN SATURATION: 97 % | TEMPERATURE: 96.9 F | BODY MASS INDEX: 27.29 KG/M2 | SYSTOLIC BLOOD PRESSURE: 150 MMHG | HEIGHT: 60 IN | HEART RATE: 80 BPM | RESPIRATION RATE: 12 BRPM | DIASTOLIC BLOOD PRESSURE: 84 MMHG | WEIGHT: 139 LBS

## 2023-10-19 DIAGNOSIS — E03.9 HYPOTHYROIDISM, UNSPECIFIED: ICD-10-CM

## 2023-10-19 DIAGNOSIS — I11.9 HYPERTENSIVE HEART DISEASE W/OUT HEART FAILURE: ICD-10-CM

## 2023-10-19 DIAGNOSIS — E78.5 HYPERLIPIDEMIA, UNSPECIFIED: ICD-10-CM

## 2023-10-19 DIAGNOSIS — R30.0 DYSURIA: ICD-10-CM

## 2023-10-19 DIAGNOSIS — Z00.00 ENCOUNTER FOR GENERAL ADULT MEDICAL EXAMINATION W/OUT ABNORMAL FINDINGS: ICD-10-CM

## 2023-10-19 DIAGNOSIS — R06.09 OTHER FORMS OF DYSPNEA: ICD-10-CM

## 2023-10-19 PROCEDURE — 93000 ELECTROCARDIOGRAM COMPLETE: CPT

## 2023-10-19 PROCEDURE — 99213 OFFICE O/P EST LOW 20 MIN: CPT | Mod: 25

## 2023-10-19 PROCEDURE — XXXXX: CPT | Mod: 1L

## 2023-10-19 PROCEDURE — 99397 PER PM REEVAL EST PAT 65+ YR: CPT | Mod: 1L

## 2023-10-19 PROCEDURE — 99213 OFFICE O/P EST LOW 20 MIN: CPT | Mod: 1L

## 2023-10-19 RX ORDER — SPIRONOLACTONE 25 MG/1
25 TABLET ORAL
Qty: 60 | Refills: 3 | Status: DISCONTINUED | COMMUNITY
Start: 2023-04-04 | End: 2023-10-19

## 2023-10-19 RX ORDER — FAMOTIDINE 20 MG/1
20 TABLET, FILM COATED ORAL
Qty: 90 | Refills: 1 | Status: DISCONTINUED | COMMUNITY
Start: 2019-10-15 | End: 2023-10-19

## 2023-10-22 LAB — NT-PROBNP SERPL-MCNC: 6427 PG/ML

## 2023-10-24 ENCOUNTER — LABORATORY RESULT (OUTPATIENT)
Age: 88
End: 2023-10-24

## 2023-10-25 LAB
25(OH)D3 SERPL-MCNC: 41.1 NG/ML
ALBUMIN SERPL ELPH-MCNC: 4.3 G/DL
ALP BLD-CCNC: 106 U/L
ALT SERPL-CCNC: 56 U/L
ANION GAP SERPL CALC-SCNC: 18 MMOL/L
APPEARANCE: ABNORMAL
AST SERPL-CCNC: 78 U/L
BACTERIA: ABNORMAL /HPF
BASOPHILS # BLD AUTO: 0.05 K/UL
BASOPHILS NFR BLD AUTO: 0.6 %
BILIRUB SERPL-MCNC: 1 MG/DL
BILIRUBIN URINE: NEGATIVE
BLOOD URINE: ABNORMAL
BUN SERPL-MCNC: 34 MG/DL
CALCIUM SERPL-MCNC: 10 MG/DL
CAST: 24 /LPF
CHLORIDE SERPL-SCNC: 96 MMOL/L
CHOLEST SERPL-MCNC: 163 MG/DL
CO2 SERPL-SCNC: 27 MMOL/L
COLOR: NORMAL
CREAT SERPL-MCNC: 1.37 MG/DL
EGFR: 37 ML/MIN/1.73M2
EOSINOPHIL # BLD AUTO: 0.06 K/UL
EOSINOPHIL NFR BLD AUTO: 0.7 %
EPITHELIAL CELLS: 8 /HPF
GLUCOSE QUALITATIVE U: NEGATIVE MG/DL
GLUCOSE SERPL-MCNC: 123 MG/DL
HCT VFR BLD CALC: 52.5 %
HDLC SERPL-MCNC: 73 MG/DL
HGB BLD-MCNC: 16.6 G/DL
IMM GRANULOCYTES NFR BLD AUTO: 0.5 %
KETONES URINE: NEGATIVE MG/DL
LDLC SERPL CALC-MCNC: 73 MG/DL
LEUKOCYTE ESTERASE URINE: ABNORMAL
LYMPHOCYTES # BLD AUTO: 1.42 K/UL
LYMPHOCYTES NFR BLD AUTO: 16.9 %
MAN DIFF?: NORMAL
MCHC RBC-ENTMCNC: 31.6 GM/DL
MCHC RBC-ENTMCNC: 31.6 PG
MCV RBC AUTO: 99.8 FL
MICROSCOPIC-UA: NORMAL
MONOCYTES # BLD AUTO: 0.66 K/UL
MONOCYTES NFR BLD AUTO: 7.8 %
NEUTROPHILS # BLD AUTO: 6.18 K/UL
NEUTROPHILS NFR BLD AUTO: 73.5 %
NITRITE URINE: NEGATIVE
NONHDLC SERPL-MCNC: 90 MG/DL
PH URINE: 5.5
PLATELET # BLD AUTO: 215 K/UL
POTASSIUM SERPL-SCNC: 4.4 MMOL/L
PROT SERPL-MCNC: 7.1 G/DL
PROTEIN URINE: 100 MG/DL
RBC # BLD: 5.26 M/UL
RBC # FLD: 13.8 %
RED BLOOD CELLS URINE: 238 /HPF
REVIEW: NORMAL
SODIUM SERPL-SCNC: 141 MMOL/L
SPECIFIC GRAVITY URINE: 1.01
TRIGL SERPL-MCNC: 97 MG/DL
TSH SERPL-ACNC: 4.12 UIU/ML
UROBILINOGEN URINE: 1 MG/DL
VIT B12 SERPL-MCNC: 1149 PG/ML
WBC # FLD AUTO: 8.41 K/UL
WBC CLUMPS: PRESENT
WHITE BLOOD CELLS URINE: >998 /HPF

## 2023-11-07 ENCOUNTER — APPOINTMENT (OUTPATIENT)
Dept: NEPHROLOGY | Facility: CLINIC | Age: 88
End: 2023-11-07
Payer: MEDICARE

## 2023-11-07 VITALS
WEIGHT: 139.77 LBS | DIASTOLIC BLOOD PRESSURE: 79 MMHG | OXYGEN SATURATION: 95 % | HEART RATE: 82 BPM | BODY MASS INDEX: 24.77 KG/M2 | TEMPERATURE: 97.2 F | HEIGHT: 63 IN | SYSTOLIC BLOOD PRESSURE: 122 MMHG

## 2023-11-07 DIAGNOSIS — F41.9 ANXIETY DISORDER, UNSPECIFIED: ICD-10-CM

## 2023-11-07 PROCEDURE — 99213 OFFICE O/P EST LOW 20 MIN: CPT

## 2023-11-07 RX ORDER — BUMETANIDE 1 MG/1
1 TABLET ORAL DAILY
Qty: 90 | Refills: 3 | Status: ACTIVE | COMMUNITY
Start: 2023-11-07 | End: 1900-01-01

## 2023-11-08 PROBLEM — F41.9 ANXIETY: Status: ACTIVE | Noted: 2022-02-16

## 2023-11-10 ENCOUNTER — NON-APPOINTMENT (OUTPATIENT)
Age: 88
End: 2023-11-10

## 2023-11-12 ENCOUNTER — NON-APPOINTMENT (OUTPATIENT)
Age: 88
End: 2023-11-12

## 2023-11-17 ENCOUNTER — LABORATORY RESULT (OUTPATIENT)
Age: 88
End: 2023-11-17

## 2023-11-19 ENCOUNTER — LABORATORY RESULT (OUTPATIENT)
Age: 88
End: 2023-11-19

## 2023-11-22 ENCOUNTER — APPOINTMENT (OUTPATIENT)
Dept: NEPHROLOGY | Facility: CLINIC | Age: 88
End: 2023-11-22
Payer: MEDICARE

## 2023-11-22 VITALS
SYSTOLIC BLOOD PRESSURE: 137 MMHG | HEART RATE: 105 BPM | HEIGHT: 63 IN | TEMPERATURE: 97.4 F | DIASTOLIC BLOOD PRESSURE: 91 MMHG | OXYGEN SATURATION: 93 %

## 2023-11-22 PROCEDURE — 99213 OFFICE O/P EST LOW 20 MIN: CPT

## 2023-11-22 RX ORDER — FUROSEMIDE 40 MG/1
40 TABLET ORAL
Qty: 180 | Refills: 1 | Status: DISCONTINUED | COMMUNITY
Start: 2023-02-09 | End: 2023-11-22

## 2023-11-22 RX ORDER — ZOSTER VACCINE RECOMBINANT, ADJUVANTED 50 MCG/0.5
50 KIT INTRAMUSCULAR
Qty: 1 | Refills: 1 | Status: DISCONTINUED | COMMUNITY
Start: 2021-12-07 | End: 2023-11-22

## 2023-11-22 RX ORDER — SULFAMETHOXAZOLE AND TRIMETHOPRIM 800; 160 MG/1; MG/1
800-160 TABLET ORAL TWICE DAILY
Qty: 10 | Refills: 0 | Status: DISCONTINUED | COMMUNITY
Start: 2023-10-19 | End: 2023-11-22

## 2023-11-22 RX ORDER — ZOSTER VACCINE RECOMBINANT, ADJUVANTED 50 MCG/0.5
50 KIT INTRAMUSCULAR
Qty: 1 | Refills: 1 | Status: DISCONTINUED | COMMUNITY
Start: 2022-08-17 | End: 2023-11-22

## 2023-11-22 RX ORDER — POTASSIUM CHLORIDE 750 MG/1
10 CAPSULE, EXTENDED RELEASE ORAL TWICE DAILY
Qty: 60 | Refills: 3 | Status: DISCONTINUED | COMMUNITY
Start: 2023-11-22 | End: 2023-11-22

## 2023-11-27 ENCOUNTER — LABORATORY RESULT (OUTPATIENT)
Age: 88
End: 2023-11-27

## 2023-11-29 ENCOUNTER — NON-APPOINTMENT (OUTPATIENT)
Age: 88
End: 2023-11-29

## 2023-11-30 DIAGNOSIS — E87.6 HYPOKALEMIA: ICD-10-CM

## 2023-11-30 RX ORDER — POTASSIUM CHLORIDE 20 MEQ/15ML
20 MEQ/15ML SOLUTION ORAL DAILY
Qty: 1 | Refills: 0 | Status: ACTIVE | COMMUNITY
Start: 2023-11-30 | End: 1900-01-01

## 2023-12-04 DIAGNOSIS — R74.8 ABNORMAL LEVELS OF OTHER SERUM ENZYMES: ICD-10-CM

## 2023-12-05 ENCOUNTER — LABORATORY RESULT (OUTPATIENT)
Age: 88
End: 2023-12-05

## 2023-12-15 ENCOUNTER — LABORATORY RESULT (OUTPATIENT)
Age: 88
End: 2023-12-15

## 2023-12-19 ENCOUNTER — APPOINTMENT (OUTPATIENT)
Dept: NEPHROLOGY | Facility: CLINIC | Age: 88
End: 2023-12-19

## 2023-12-22 ENCOUNTER — NON-APPOINTMENT (OUTPATIENT)
Age: 88
End: 2023-12-22

## 2024-01-23 ENCOUNTER — APPOINTMENT (OUTPATIENT)
Dept: NEPHROLOGY | Facility: CLINIC | Age: 89
End: 2024-01-23
Payer: MEDICARE

## 2024-01-23 VITALS
HEIGHT: 63 IN | OXYGEN SATURATION: 98 % | DIASTOLIC BLOOD PRESSURE: 75 MMHG | WEIGHT: 116 LBS | HEART RATE: 97 BPM | BODY MASS INDEX: 20.55 KG/M2 | RESPIRATION RATE: 15 BRPM | SYSTOLIC BLOOD PRESSURE: 104 MMHG

## 2024-01-23 PROCEDURE — 99214 OFFICE O/P EST MOD 30 MIN: CPT

## 2024-01-23 NOTE — ASSESSMENT
[FreeTextEntry1] : 1. PAD w/ evidence of threatening ischemia of the left foot. Discussed the urgency of the situation. We will email Dr Tinoco (vascular) and Dr. Munoz (cardiology) to appraise them of the situation. If Dr. Tinoco cannot see the patient urgently, will refer to Dr. Powell. The patient and her daughter (over the phone) were informed of the urgency of the situation, the risk of amputation, and the risk of intervention to save the foot and associated complications.  2. Atrial fibrillation on Eliquis. 3. CKD IV. 4. Hypothyroidism. 5 .Hypertension: BP low. PLAN: labs today. Will call patient and daughter w/ results in AM. Will email Bekah Tinoco and Tammy.

## 2024-01-23 NOTE — HISTORY OF PRESENT ILLNESS
[FreeTextEntry1] : Since the last visit, she has developed resting pain in the left foot w/ cyanosis mostly at night.  She was evaluated yesterday by a RN the arm/ankle index was 1.15 on the right and only 0.2 on the left.  She is asking to be referred back to Dr. Tinoco for evaluation.  Mrs. Waggoner has her usual multiple complaints: depressed, has constipation w/ abdominal discomfort, dizziness, feeling week.  She receives frequent telephone calls from her daughter who lives in [place].  Unfortunately the daughter has medical issues and has decided that she cannot have her mother live w/ her. The patient depends on a good neighbor for transportation to doctor offices and help w/ food shopping. She is able to perform her ADL alone but slowly.   Her weight has been stable around 115 to 116. PND is no longer present, minimal or no edema.  Here for evaluation of acute limb threatening ischemia of the left foot.

## 2024-01-23 NOTE — PHYSICAL EXAM
[General Appearance - Alert] : alert [Sclera] : the sclera and conjunctiva were normal [Hearing Threshold Finger Rub Not [place]] : hearing was normal [Jugular Venous Distention Increased] : there was no jugular-venous distention [Auscultation Breath Sounds / Voice Sounds] : lungs were clear to auscultation bilaterally [Irregularly Irregular] : the rhythm was irregularly irregular [Arterial Pulses Femoral] : femoral pulses were normal without bruits [Edema] : there was no peripheral edema [FreeTextEntry1] : Very decreased left pedal pulse, with cold foot and cyanosis. [Abdomen Tenderness] : non-tender [] : no hepato-splenomegaly [Urinary Bladder Findings] : the bladder was normal on palpation [No CVA Tenderness] : no ~M costovertebral angle tenderness [Shuffling] : shuffling [No Focal Deficits] : no focal deficits [Oriented To Time, Place, And Person] : oriented to person, place, and time

## 2024-01-23 NOTE — REASON FOR VISIT
[Follow-Up] : a follow-up visit [FreeTextEntry1] : Seen in follow up for multiple comorbidities including CKD, atrial fibrillation and PAD

## 2024-01-24 LAB
ALBUMIN SERPL ELPH-MCNC: 4.1 G/DL
ALP BLD-CCNC: 83 U/L
ALT SERPL-CCNC: 22 U/L
ANION GAP SERPL CALC-SCNC: 19 MMOL/L
AST SERPL-CCNC: 31 U/L
BILIRUB SERPL-MCNC: 0.6 MG/DL
BUN SERPL-MCNC: 80 MG/DL
CALCIUM SERPL-MCNC: 9.7 MG/DL
CHLORIDE SERPL-SCNC: 93 MMOL/L
CO2 SERPL-SCNC: 28 MMOL/L
CREAT SERPL-MCNC: 2.27 MG/DL
EGFR: 20 ML/MIN/1.73M2
GLUCOSE SERPL-MCNC: 75 MG/DL
HCT VFR BLD CALC: 51.2 %
HGB BLD-MCNC: 16.4 G/DL
MCHC RBC-ENTMCNC: 32 GM/DL
MCHC RBC-ENTMCNC: 32 PG
MCV RBC AUTO: 100 FL
NT-PROBNP SERPL-MCNC: 2836 PG/ML
PLATELET # BLD AUTO: 176 K/UL
POTASSIUM SERPL-SCNC: 4.1 MMOL/L
PROT SERPL-MCNC: 7.4 G/DL
RBC # BLD: 5.12 M/UL
RBC # FLD: 15.5 %
SODIUM SERPL-SCNC: 139 MMOL/L
T3 SERPL-MCNC: 85 NG/DL
T4 SERPL-MCNC: 8.6 UG/DL
TSH SERPL-ACNC: 0.91 UIU/ML
WBC # FLD AUTO: 6.1 K/UL

## 2024-01-26 ENCOUNTER — OUTPATIENT (OUTPATIENT)
Dept: OUTPATIENT SERVICES | Facility: HOSPITAL | Age: 89
LOS: 1 days | End: 2024-01-26
Payer: MEDICARE

## 2024-01-26 ENCOUNTER — RESULT REVIEW (OUTPATIENT)
Age: 89
End: 2024-01-26

## 2024-01-26 ENCOUNTER — APPOINTMENT (OUTPATIENT)
Dept: CARDIOLOGY | Facility: CLINIC | Age: 89
End: 2024-01-26
Payer: MEDICARE

## 2024-01-26 VITALS
OXYGEN SATURATION: 96 % | HEART RATE: 107 BPM | WEIGHT: 116 LBS | HEIGHT: 63 IN | SYSTOLIC BLOOD PRESSURE: 108 MMHG | DIASTOLIC BLOOD PRESSURE: 69 MMHG | BODY MASS INDEX: 20.55 KG/M2

## 2024-01-26 DIAGNOSIS — Z90.49 ACQUIRED ABSENCE OF OTHER SPECIFIED PARTS OF DIGESTIVE TRACT: Chronic | ICD-10-CM

## 2024-01-26 DIAGNOSIS — Z87.19 PERSONAL HISTORY OF OTHER DISEASES OF THE DIGESTIVE SYSTEM: ICD-10-CM

## 2024-01-26 DIAGNOSIS — Z82.49 FAMILY HISTORY OF ISCHEMIC HEART DISEASE AND OTHER DISEASES OF THE CIRCULATORY SYSTEM: ICD-10-CM

## 2024-01-26 DIAGNOSIS — I73.9 PERIPHERAL VASCULAR DISEASE, UNSPECIFIED: ICD-10-CM

## 2024-01-26 PROCEDURE — 93925 LOWER EXTREMITY STUDY: CPT

## 2024-01-26 PROCEDURE — 93923 UPR/LXTR ART STDY 3+ LVLS: CPT | Mod: 26

## 2024-01-26 PROCEDURE — 99214 OFFICE O/P EST MOD 30 MIN: CPT

## 2024-01-26 PROCEDURE — 93925 LOWER EXTREMITY STUDY: CPT | Mod: 26

## 2024-01-26 PROCEDURE — 93923 UPR/LXTR ART STDY 3+ LVLS: CPT

## 2024-01-26 NOTE — PHYSICAL EXAM
[Heart Sounds] : normal S1 and S2 [Heart Rate And Rhythm] : heart rate and rhythm were normal [Respiration, Rhythm And Depth] : normal respiratory rhythm and effort [] : no respiratory distress [Auscultation Breath Sounds / Voice Sounds] : lungs were clear to auscultation bilaterally [Bowel Sounds] : normal bowel sounds [Abdomen Soft] : soft [Abdomen Tenderness] : non-tender [Abnormal Walk] : normal gait [Oriented To Time, Place, And Person] : oriented to person, place, and time [FreeTextEntry1] : see above

## 2024-01-26 NOTE — ASSESSMENT
[FreeTextEntry1] : Assessment: 1. Acrocyanosis on exam  2. History of PAD   Plan 1.  Arterial duplex and JUAN FRANCISCO done today  2.  JUAN FRANCISCO is pulsatile and arterial duplex does not show any significant proximal disease, only distal stenosis/moderate 3.  No role for arterial angio   4.  Return in  2 months to re-evaluate

## 2024-01-26 NOTE — REASON FOR VISIT
[FreeTextEntry1] : 1/26/24  88 y/o F with PMHX HTN, HLD, CKD, Hypothyroidism, history of PAD, HFrEF, Afib, who presents for evaluation of blue purple discoloration that resolves with elevation, noticed by Dr. Mccrary during the last visit. Tiny skin tear L 3rd digit after cutting her nails last night. On Eliquis 2.5 mg BID.

## 2024-01-26 NOTE — REVIEW OF SYSTEMS
[Negative] : Heme/Lymph [FreeTextEntry5] : see HPI [FreeTextEntry9] : see HPI [de-identified] : see HPI

## 2024-01-29 ENCOUNTER — NON-APPOINTMENT (OUTPATIENT)
Age: 89
End: 2024-01-29

## 2024-02-07 ENCOUNTER — APPOINTMENT (OUTPATIENT)
Dept: NEPHROLOGY | Facility: CLINIC | Age: 89
End: 2024-02-07
Payer: MEDICARE

## 2024-02-07 VITALS
WEIGHT: 116 LBS | BODY MASS INDEX: 20.55 KG/M2 | HEART RATE: 98 BPM | SYSTOLIC BLOOD PRESSURE: 102 MMHG | DIASTOLIC BLOOD PRESSURE: 64 MMHG | OXYGEN SATURATION: 97 % | HEIGHT: 63 IN | RESPIRATION RATE: 16 BRPM

## 2024-02-07 DIAGNOSIS — F41.8 OTHER SPECIFIED ANXIETY DISORDERS: ICD-10-CM

## 2024-02-07 PROCEDURE — 99213 OFFICE O/P EST LOW 20 MIN: CPT

## 2024-02-07 RX ORDER — ZOSTER VACCINE RECOMBINANT, ADJUVANTED 50 MCG/0.5
50 KIT INTRAMUSCULAR
Qty: 1 | Refills: 1 | Status: DISCONTINUED | COMMUNITY
Start: 2020-12-01 | End: 2024-02-07

## 2024-02-07 NOTE — PHYSICAL EXAM
[General Appearance - Alert] : alert [Sclera] : the sclera and conjunctiva were normal [Hearing Threshold Finger Rub Not [place]] : hearing was normal [Jugular Venous Distention Increased] : there was no jugular-venous distention [Auscultation Breath Sounds / Voice Sounds] : lungs were clear to auscultation bilaterally [Irregularly Irregular] : the rhythm was irregularly irregular [Systolic grade ___/6] : A grade [unfilled]/6 systolic murmur was heard. [Pitting Edema] : pitting edema present [___ +] : bilateral [unfilled]+ pitting edema to the ankles [Abdomen Tenderness] : non-tender [Urinary Bladder Findings] : the bladder was normal on palpation [No CVA Tenderness] : no ~M costovertebral angle tenderness [Shuffling] : shuffling [] : no rash [No Focal Deficits] : no focal deficits [FreeTextEntry1] : Depressed

## 2024-02-07 NOTE — REASON FOR VISIT
[Follow-Up] : a follow-up visit [FreeTextEntry1] : Follow up for CKD IV, atrial fibrillation and peripheral vascular disease.

## 2024-02-07 NOTE — ASSESSMENT
[FreeTextEntry1] : 1. CKD IV. Recent labs show higher serum creatinine.  The patient has nocturnal dyspnea.  We discuss that she should stop the Bumex unless her weight goes above 118 pounds.  Will do labs today and call the patient in AM w/ the results.  2. Atrial fibrillation: the patient has a follow up w/ Dr. Munoz early March. Continue Eliquis. 3. Peripheral artery disease. Acrocyanosis of the left foot. No impending gangrene. Follow up w/ Vascular in March. 4. Depression w/ anxiety.  Not suicidal.

## 2024-02-07 NOTE — HISTORY OF PRESENT ILLNESS
[FreeTextEntry1] : Mrs. Branham is here for follow up.  Since last visit she has been monitoring her weight and her BP dailys.  Her current weight is 116 and her home BP is 110/60 but at times is in the low 90 systolic. The patient's anxiety/depression is at times worse. She has been experiencing nightmares.  She also has had some constipation but she has bowel movements at least 4 times per week.

## 2024-02-08 LAB
ALBUMIN SERPL ELPH-MCNC: 4.3 G/DL
ALP BLD-CCNC: 77 U/L
ALT SERPL-CCNC: 21 U/L
ANION GAP SERPL CALC-SCNC: 18 MMOL/L
AST SERPL-CCNC: 32 U/L
BILIRUB SERPL-MCNC: 0.8 MG/DL
BUN SERPL-MCNC: 72 MG/DL
CALCIUM SERPL-MCNC: 9.5 MG/DL
CHLORIDE SERPL-SCNC: 96 MMOL/L
CO2 SERPL-SCNC: 28 MMOL/L
CREAT SERPL-MCNC: 1.91 MG/DL
EGFR: 25 ML/MIN/1.73M2
GLUCOSE SERPL-MCNC: 82 MG/DL
HCT VFR BLD CALC: 50.3 %
HGB BLD-MCNC: 16.2 G/DL
MCHC RBC-ENTMCNC: 31 PG
MCHC RBC-ENTMCNC: 32.2 GM/DL
MCV RBC AUTO: 96.4 FL
PLATELET # BLD AUTO: 181 K/UL
POTASSIUM SERPL-SCNC: 3.7 MMOL/L
PROT SERPL-MCNC: 7.7 G/DL
RBC # BLD: 5.22 M/UL
RBC # FLD: 16.4 %
SODIUM SERPL-SCNC: 142 MMOL/L
TSH SERPL-ACNC: 0.99 UIU/ML
WBC # FLD AUTO: 7.29 K/UL

## 2024-03-06 ENCOUNTER — APPOINTMENT (OUTPATIENT)
Dept: NEPHROLOGY | Facility: CLINIC | Age: 89
End: 2024-03-06
Payer: MEDICARE

## 2024-03-06 ENCOUNTER — LABORATORY RESULT (OUTPATIENT)
Age: 89
End: 2024-03-06

## 2024-03-06 VITALS
DIASTOLIC BLOOD PRESSURE: 66 MMHG | WEIGHT: 120 LBS | BODY MASS INDEX: 21.26 KG/M2 | SYSTOLIC BLOOD PRESSURE: 118 MMHG | HEIGHT: 63 IN | HEART RATE: 88 BPM | RESPIRATION RATE: 16 BRPM

## 2024-03-06 DIAGNOSIS — I73.9 PERIPHERAL VASCULAR DISEASE, UNSPECIFIED: ICD-10-CM

## 2024-03-06 DIAGNOSIS — R79.89 OTHER SPECIFIED ABNORMAL FINDINGS OF BLOOD CHEMISTRY: ICD-10-CM

## 2024-03-06 PROCEDURE — 99213 OFFICE O/P EST LOW 20 MIN: CPT

## 2024-03-06 NOTE — ASSESSMENT
[FreeTextEntry1] : 1. CKD IV. Will check labs today. No uremic symptoms. 2. Atrial fibrillation, rate controlled on Eliquis. 3. Hypertension: controlled. 4. Peripheral arterial disease. No impending gangrene. Follow up w/ vascular. Will call the patient in AM with the results of the labs. Return in 2 months.

## 2024-03-06 NOTE — HISTORY OF PRESENT ILLNESS
[FreeTextEntry1] : The patient presents today w/ the usual issues: depression, daughter lives far away, she depends on neighbor help for shopping.  She is still experiencing constipation but is able to move her bowels at least 4 days per week.  She feels her balance is an issue but has not fallen or fainted.  Her left foot is painful at night.  She was diagnosed w/ acrocyanosis and has a follow up w/ Dr. Tinoco on March 18.  Her shortness of breath is only when she does more than her usual activity of daily living. No chest pain.  Her for follow up.

## 2024-03-06 NOTE — PHYSICAL EXAM
[General Appearance - Alert] : alert [Sclera] : the sclera and conjunctiva were normal [Hearing Threshold Finger Rub Not [place]] : hearing was normal [Jugular Venous Distention Increased] : there was no jugular-venous distention [Auscultation Breath Sounds / Voice Sounds] : lungs were clear to auscultation bilaterally [Irregularly Irregular] : the rhythm was irregularly irregular [Systolic grade ___/6] : A grade [unfilled]/6 systolic murmur was heard. [Pitting Edema] : pitting edema present [___ +] : bilateral [unfilled]+ pitting edema to the ankles [Abdomen Tenderness] : non-tender [Urinary Bladder Findings] : the bladder was normal on palpation [No CVA Tenderness] : no ~M costovertebral angle tenderness [Shuffling] : shuffling [] : no rash [No Focal Deficits] : no focal deficits [FreeTextEntry1] : Depressed

## 2024-03-06 NOTE — REASON FOR VISIT
[Follow-Up] : a follow-up visit [FreeTextEntry1] : Follow up for CKD, atrial fibrillation and peripheral vascular disease.

## 2024-03-07 RX ORDER — METOLAZONE 5 MG/1
5 TABLET ORAL DAILY
Qty: 90 | Refills: 3 | Status: ACTIVE | COMMUNITY
Start: 2023-11-09 | End: 1900-01-01

## 2024-03-18 ENCOUNTER — APPOINTMENT (OUTPATIENT)
Dept: CARDIOLOGY | Facility: CLINIC | Age: 89
End: 2024-03-18
Payer: MEDICARE

## 2024-03-18 VITALS
OXYGEN SATURATION: 98 % | RESPIRATION RATE: 14 BRPM | HEIGHT: 63 IN | TEMPERATURE: 96.4 F | DIASTOLIC BLOOD PRESSURE: 75 MMHG | HEART RATE: 90 BPM | SYSTOLIC BLOOD PRESSURE: 113 MMHG

## 2024-03-18 DIAGNOSIS — R25.2 CRAMP AND SPASM: ICD-10-CM

## 2024-03-18 PROCEDURE — G2211 COMPLEX E/M VISIT ADD ON: CPT

## 2024-03-18 PROCEDURE — 99214 OFFICE O/P EST MOD 30 MIN: CPT

## 2024-03-18 NOTE — HISTORY OF PRESENT ILLNESS
[FreeTextEntry1] : Gia is very anxious and gets SOB with minimal exertion. Overall not feeling well. Friend here with her and daughter on phone. We reviewed all medications and made corrections. She was invited for [place] and wants to know if safe to go.  No

## 2024-03-18 NOTE — REVIEW OF SYSTEMS
[Negative] : Psychiatric [FreeTextEntry5] : see HPI [FreeTextEntry9] : see HPI [de-identified] : see HPI

## 2024-03-18 NOTE — ASSESSMENT
[FreeTextEntry1] : Assessment: 1. Acrocyanosis on exam  2. History of PAD   Plan 1.   We will repeat arterial duplex to assure no stenosis was missed 2.  She has a resting blue/purple discoloration that resolves with elevation and warming 3.  No wounds 4.  Daily foot checks 5.  Followup in 3 months  6.  Keep core body termpature warm, wool socks

## 2024-03-18 NOTE — PHYSICAL EXAM
[] : no respiratory distress [Respiration, Rhythm And Depth] : normal respiratory rhythm and effort [Auscultation Breath Sounds / Voice Sounds] : lungs were clear to auscultation bilaterally [Heart Rate And Rhythm] : heart rate and rhythm were normal [Bowel Sounds] : normal bowel sounds [Heart Sounds] : normal S1 and S2 [Abdomen Tenderness] : non-tender [Abnormal Walk] : normal gait [Abdomen Soft] : soft [Oriented To Time, Place, And Person] : oriented to person, place, and time [FreeTextEntry1] : see above

## 2024-03-18 NOTE — REASON FOR VISIT
[FreeTextEntry1] : 3/18  Since last visit patient reports doing ok  she has some itching and discomfort at times she is wearing compression She has mild, intermitted symptoms at night.   1/26/24  89 y/o F with PMHX HTN, HLD, CKD, Hypothyroidism, history of PAD, HFrEF, Afib, who presents for evaluation of blue purple discoloration that resolves with elevation, noticed by Dr. Mccrary during the last visit. Tiny skin tear L 3rd digit after cutting her nails last night. On Eliquis 2.5 mg BID.

## 2024-03-19 ENCOUNTER — APPOINTMENT (OUTPATIENT)
Dept: INTERNAL MEDICINE | Facility: CLINIC | Age: 89
End: 2024-03-19
Payer: MEDICARE

## 2024-03-19 ENCOUNTER — NON-APPOINTMENT (OUTPATIENT)
Age: 89
End: 2024-03-19

## 2024-03-19 VITALS
OXYGEN SATURATION: 97 % | BODY MASS INDEX: 20.73 KG/M2 | DIASTOLIC BLOOD PRESSURE: 74 MMHG | RESPIRATION RATE: 14 BRPM | HEIGHT: 63 IN | SYSTOLIC BLOOD PRESSURE: 117 MMHG | HEART RATE: 44 BPM | WEIGHT: 117 LBS

## 2024-03-19 DIAGNOSIS — K59.00 CONSTIPATION, UNSPECIFIED: ICD-10-CM

## 2024-03-19 DIAGNOSIS — Z13.820 ENCOUNTER FOR SCREENING FOR OSTEOPOROSIS: ICD-10-CM

## 2024-03-19 DIAGNOSIS — R09.82 POSTNASAL DRIP: ICD-10-CM

## 2024-03-19 PROCEDURE — 99214 OFFICE O/P EST MOD 30 MIN: CPT

## 2024-03-19 RX ORDER — DOCUSATE SODIUM 100 MG/1
100 CAPSULE ORAL TWICE DAILY
Qty: 60 | Refills: 3 | Status: ACTIVE | COMMUNITY
Start: 2024-03-19 | End: 1900-01-01

## 2024-03-19 RX ORDER — MOMETASONE 50 UG/1
50 SPRAY, METERED NASAL
Qty: 1 | Refills: 0 | Status: ACTIVE | COMMUNITY
Start: 2024-03-19 | End: 1900-01-01

## 2024-03-19 NOTE — PHYSICAL EXAM
[No Acute Distress] : no acute distress [Normal Sclera/Conjunctiva] : normal sclera/conjunctiva [PERRL] : pupils equal round and reactive to light [EOMI] : extraocular movements intact [Normal Outer Ear/Nose] : the outer ears and nose were normal in appearance [No JVD] : no jugular venous distention [Normal Oropharynx] : the oropharynx was normal [No Lymphadenopathy] : no lymphadenopathy [Supple] : supple [No Respiratory Distress] : no respiratory distress  [No Accessory Muscle Use] : no accessory muscle use [Clear to Auscultation] : lungs were clear to auscultation bilaterally [No Murmur] : no murmur heard [Normal Posterior Cervical Nodes] : no posterior cervical lymphadenopathy [Normal Anterior Cervical Nodes] : no anterior cervical lymphadenopathy [No CVA Tenderness] : no CVA  tenderness [No Spinal Tenderness] : no spinal tenderness [No Joint Swelling] : no joint swelling [Grossly Normal Strength/Tone] : grossly normal strength/tone [Coordination Grossly Intact] : coordination grossly intact [No Focal Deficits] : no focal deficits [Normal Gait] : normal gait [Deep Tendon Reflexes (DTR)] : deep tendon reflexes were 2+ and symmetric [Normal Affect] : the affect was normal [Normal Insight/Judgement] : insight and judgment were intact [Normal TMs] : both tympanic membranes were normal [No Edema] : there was no peripheral edema [de-identified] : +varicose veins/ + purplish feet discoloration which resolves this leg elevation  [de-identified] : + postnasal drip [de-identified] : +below Rt ear scaly skin lesion

## 2024-03-19 NOTE — PLAN
[FreeTextEntry1] : See plan    Constipation start colace 100 mg 1-2 tab QHS  Post Nasal Drip  start mometasone furoate 50 mcg/act nasal  Cyst on Lt breast US of b/l breast  PAD folowing with vascular Arterial doppler pending   Hypothyroidism  increase Levothyroxine to 75 mcg daily  Repeat TSH in 1 month    Elevated Creatinine  Following with Nephrology - 5/16/2023   HTN/HLD/Afib  Follows with Dr. Munoz  cont Losartan 25 mg daily  cont Metoprolol 25 mg daily  cont Lasix 40 mg twice daily  cont Eliquis 2.5 mg twice daily  cont Simvastatin 20 mg daily  cont Spironolactone 25 mg daily   EKG " A.fib @111

## 2024-03-19 NOTE — HISTORY OF PRESENT ILLNESS
[de-identified] : Ms. LEONIDAS PEREA is an 89-year-old female, accompanied by her daughter, with hx. of acute systolic congestive heart failure, anxiety, Afib, GERD, HTN, CKD, stage IV, HLD, hypothyroidism, and PAD, who presents for an annual physical exam.  Pt c/o rhinorrhea and a post-nasal drip. She denies cough, sore throat. She also states that she has a new skin lesion on the RT side of her neck. Has not followed with a dermatologist, nor would she like to.  Denies any F/C, N/V or abdominal pain Patient was seen by vascular doctor Dr. Koehler yesterday for purplish food discoloration.  Discoloration resolves with leg elevation.  She is awaiting arterial Doppler. Pt also states that she has chronic  constipation. The times she does have a bowel movement, her stool is described to be thin, long, and hard. She reports that she has tried enema, prunes, Smooth Move tea without any improvement in her sxs. Denies rectal pain, melena, blood in stool, diarrhea.

## 2024-03-19 NOTE — REVIEW OF SYSTEMS
[Postnasal Drip] : postnasal drip [Negative] : Heme/Lymph [Nausea] : no nausea [Abdominal Pain] : no abdominal pain [Constipation] : constipation [Vomiting] : no vomiting [Heartburn] : no heartburn [FreeTextEntry4] : rhinorrhea

## 2024-03-20 ENCOUNTER — NON-APPOINTMENT (OUTPATIENT)
Age: 89
End: 2024-03-20

## 2024-03-20 ENCOUNTER — APPOINTMENT (OUTPATIENT)
Dept: CARDIOLOGY | Facility: CLINIC | Age: 89
End: 2024-03-20
Payer: MEDICARE

## 2024-03-20 ENCOUNTER — RX RENEWAL (OUTPATIENT)
Age: 89
End: 2024-03-20

## 2024-03-20 VITALS
TEMPERATURE: 97.5 F | RESPIRATION RATE: 14 BRPM | OXYGEN SATURATION: 97 % | DIASTOLIC BLOOD PRESSURE: 75 MMHG | SYSTOLIC BLOOD PRESSURE: 121 MMHG | BODY MASS INDEX: 20.73 KG/M2 | WEIGHT: 117 LBS

## 2024-03-20 VITALS — HEART RATE: 90 BPM

## 2024-03-20 DIAGNOSIS — I50.21 ACUTE SYSTOLIC (CONGESTIVE) HEART FAILURE: ICD-10-CM

## 2024-03-20 DIAGNOSIS — R07.89 OTHER CHEST PAIN: ICD-10-CM

## 2024-03-20 PROCEDURE — 99214 OFFICE O/P EST MOD 30 MIN: CPT

## 2024-03-20 PROCEDURE — G2211 COMPLEX E/M VISIT ADD ON: CPT

## 2024-03-20 RX ORDER — METOPROLOL SUCCINATE 25 MG/1
25 TABLET, EXTENDED RELEASE ORAL DAILY
Qty: 60 | Refills: 2 | Status: ACTIVE | COMMUNITY
Start: 2023-09-13 | End: 1900-01-01

## 2024-03-20 NOTE — HISTORY OF PRESENT ILLNESS
[FreeTextEntry1] : Gia is now on bumex 1mg daily. If weight increase then call Dr. Mccrary. On liquid potassium at lunch time. Labs ok. Here with daughter who lives in [facility]. Headaches in the AM. Otherwise no symptoms. Weight daily. No SOB. Discoloration of LE and hands treatment wool socks and gloves.

## 2024-03-20 NOTE — DISCUSSION/SUMMARY
[FreeTextEntry1] : The patient is an 89-year-old anxious female HTN, HLD, HH, persistent A-fib with RV dysfunction, CKD who is SOB #1 Afib- c/w eliquis 2.5mg bid, c/w  toprol 25mg,  #2 HFrEF- RV failure, off losartan 25mg, c/w bumex 1mg, metolazone 5mg daily and spironolactone 25mg, f/u Dr. Mccrary, Labs today #3 HLD- c/w simvastatin 10mg #4 Thyroid- c/w levothyroxine #5 Anxiety- on clonazepam, stress management and discuss with daughter getting help at home.  #6 PAD- discoloration secondary to varicose veins, wool socks at night, f/u Dr. Tinoco Daughter lives in [facility].

## 2024-03-20 NOTE — PHYSICAL EXAM
[No Acute Distress] : no acute distress [Well Developed] : well developed [Well Nourished] : well nourished [Normal Venous Pressure] : normal venous pressure [Normal Conjunctiva] : normal conjunctiva [Normal S1, S2] : normal S1, S2 [No Carotid Bruit] : no carotid bruit [No Murmur] : no murmur [No Gallop] : no gallop [No Rub] : no rub [Clear Lung Fields] : clear lung fields [Good Air Entry] : good air entry [No Respiratory Distress] : no respiratory distress  [Soft] : abdomen soft [Non Tender] : non-tender [Normal Bowel Sounds] : normal bowel sounds [No Masses/organomegaly] : no masses/organomegaly [Normal Gait] : normal gait [No Clubbing] : no clubbing [No Varicosities] : no varicosities [No Rash] : no rash [No Skin Lesions] : no skin lesions [Moves all extremities] : moves all extremities [No Focal Deficits] : no focal deficits [Alert and Oriented] : alert and oriented [Normal Speech] : normal speech [Normal memory] : normal memory [de-identified] : purplish feet, edema

## 2024-03-20 NOTE — HISTORY OF PRESENT ILLNESS
[FreeTextEntry1] : Gia is now on bumex 1mg daily. If weight increase then call Dr. Mccrary. On liquid potassium at lunch time. Labs ok. Here with daughter who lives in [facility]. Headaches in the AM. Otherwise no symptoms. Weight daily. No SOB. Discoloration of LE and hands treatment wool socks and gloves.

## 2024-03-20 NOTE — DISCUSSION/SUMMARY
[FreeTextEntry1] : The patient is an 89-year-old anxious female HTN, HLD, HH, persistent A-fib with RV dysfunction, CKD who is SOB #1 Afib- c/w eliquis 2.5mg bid, c/w  toprol 25mg,  #2 HFrEF- RV failure, off losartan 25mg, c/w bumex 1mg, metolazone 5mg daily and spironolactone 25mg, f/u Dr. Mccrary, Labs today #3 HLD- c/w simvastatin 10mg #4 Thyroid- c/w levothyroxine #5 Anxiety- on clonazepam, stress management and discuss with daughter getting help at home.  #6 PAD- discoloration secondary to varicose veins, wool socks at night, f/u Dr. Tinoco Daughter lives in [facility].

## 2024-03-20 NOTE — PHYSICAL EXAM
[Well Developed] : well developed [No Acute Distress] : no acute distress [Well Nourished] : well nourished [Normal Conjunctiva] : normal conjunctiva [Normal Venous Pressure] : normal venous pressure [No Carotid Bruit] : no carotid bruit [No Murmur] : no murmur [Normal S1, S2] : normal S1, S2 [No Rub] : no rub [No Gallop] : no gallop [Clear Lung Fields] : clear lung fields [Good Air Entry] : good air entry [No Respiratory Distress] : no respiratory distress  [Non Tender] : non-tender [Soft] : abdomen soft [No Masses/organomegaly] : no masses/organomegaly [Normal Bowel Sounds] : normal bowel sounds [Normal Gait] : normal gait [No Clubbing] : no clubbing [No Varicosities] : no varicosities [No Rash] : no rash [No Skin Lesions] : no skin lesions [Moves all extremities] : moves all extremities [Alert and Oriented] : alert and oriented [No Focal Deficits] : no focal deficits [Normal Speech] : normal speech [Normal memory] : normal memory [de-identified] : purplish feet, edema

## 2024-03-20 NOTE — REVIEW OF SYSTEMS
[SOB] : shortness of breath [Weight Loss (___ Lbs)] : [unfilled] ~Ulb weight loss [Dyspnea on exertion] : dyspnea during exertion [Negative] : Heme/Lymph [Chest Discomfort] : no chest discomfort [Lower Ext Edema] : no extremity edema [Palpitations] : no palpitations

## 2024-03-26 NOTE — PHYSICAL EXAM
[No Acute Distress] : no acute distress [Well Nourished] : well nourished [Well Developed] : well developed [Well-Appearing] : well-appearing [Normal Sclera/Conjunctiva] : normal sclera/conjunctiva [PERRL] : pupils equal round and reactive to light [EOMI] : extraocular movements intact [Normal Outer Ear/Nose] : the outer ears and nose were normal in appearance [Normal Oropharynx] : the oropharynx was normal [Normal TMs] : both tympanic membranes were normal [No JVD] : no jugular venous distention [No Lymphadenopathy] : no lymphadenopathy [Supple] : supple [No Respiratory Distress] : no respiratory distress  [No Accessory Muscle Use] : no accessory muscle use [Clear to Auscultation] : lungs were clear to auscultation bilaterally [Normal Rate] : normal rate  [Regular Rhythm] : with a regular rhythm [No Murmur] : no murmur heard [Normal S1, S2] : normal S1 and S2 [No Carotid Bruits] : no carotid bruits [No Edema] : there was no peripheral edema [Pedal Pulses Present] : the pedal pulses are present [Non Tender] : non-tender [Soft] : abdomen soft [Non-distended] : non-distended [Normal Bowel Sounds] : normal bowel sounds [Normal Posterior Cervical Nodes] : no posterior cervical lymphadenopathy [Normal Anterior Cervical Nodes] : no anterior cervical lymphadenopathy [No CVA Tenderness] : no CVA  tenderness [No Spinal Tenderness] : no spinal tenderness [No Joint Swelling] : no joint swelling [Grossly Normal Strength/Tone] : grossly normal strength/tone [No Rash] : no rash [Coordination Grossly Intact] : coordination grossly intact [No Focal Deficits] : no focal deficits [Normal Gait] : normal gait [Normal Affect] : the affect was normal [Deep Tendon Reflexes (DTR)] : deep tendon reflexes were 2+ and symmetric [Normal Insight/Judgement] : insight and judgment were intact

## 2024-03-28 PROBLEM — E03.9 HYPOTHYROIDISM: Status: ACTIVE | Noted: 2023-02-22

## 2024-03-28 NOTE — PLAN
[FreeTextEntry1] : see plan   Hypothyroidism   Levothyroxine to 75 mcg daily  Repeat TSH today   HTN/HLD/Afib  Follows with Dr. Munoz  cont Losartan 25 mg daily  cont Metoprolol 50 mg daily  cont Lasix 40 mg twice daily  cont Eliquis 2.5 mg twice daily  cont Simvastatin 20 mg daily  cont Spironolactone 25 mg daily   Low Vit D cont Vit D 50 mcg daily  Bloodwork ordered Pt. instructed to f/u in one week for lab results.

## 2024-03-28 NOTE — HEALTH RISK ASSESSMENT
[de-identified] : Walks up and down the block a couple times a week depending on how she feels. [de-identified] : Very rarely. Less than once a month.  [de-identified] : Maintains a healthy diet.  [[id number]] : 1 [Reports changes in dental health] : Reports no changes in dental health [Guns at Home] : no guns at home [Travel to Developing Areas] : does not  travel to developing areas [TB Exposure] : is not being exposed to tuberculosis [MammogramDate] : 04/2023 [BoneDensityDate] : 04/2022 [BoneDensityComments] : osteopenia  [ColonoscopyDate] : many years ago [FreeTextEntry6] : daughter [FreeTextEntry8] : daughter [de-identified] : Decrease hearing, hasn't been to an ENT [de-identified] : Bilateral cataract follows with ophthalmologist. Wears reading glasses.   [de-identified] : Follow with dentist.  [de-identified] : Would like to discuss.

## 2024-03-28 NOTE — HISTORY OF PRESENT ILLNESS
[de-identified] : Ms. LEONIDAS PEREA is an 89-year-old female, accompanied by her daughter, with hx. of acute systolic congestive heart failure, anxiety, Afib, GERD, HTN, CKD, stage IV, HLD, hypothyroidism, and PAD, who presents for an annual physical exam.  Pt.'s daughter states she is having intermittent JOHNSON.  Has been following with her cardiologist.  Denies any F/C, N/V, leg swelling. Denies palpitations, headaches, and dizziness. Also c/o dysuria , denies fever, chills, blood in urine

## 2024-03-28 NOTE — REVIEW OF SYSTEMS
[Shortness Of Breath] : shortness of breath [Cough] : cough [Dyspnea on Exertion] : dyspnea on exertion [Dysuria] : dysuria [Negative] : Heme/Lymph [Wheezing] : no wheezing [Incontinence] : no incontinence [Hematuria] : no hematuria [Frequency] : frequency

## 2024-04-10 ENCOUNTER — RX RENEWAL (OUTPATIENT)
Age: 89
End: 2024-04-10

## 2024-04-10 RX ORDER — SPIRONOLACTONE 25 MG/1
25 TABLET ORAL
Qty: 30 | Refills: 3 | Status: ACTIVE | COMMUNITY
Start: 2023-11-22 | End: 1900-01-01

## 2024-04-10 NOTE — SWALLOW BEDSIDE ASSESSMENT ADULT - SWALLOW EVAL: VELAR ELEVATION
Per Lex, patient will need home health nurses arranged and then we will contact patient. Do you guys arrange home health nurses?    intact

## 2024-04-30 ENCOUNTER — APPOINTMENT (OUTPATIENT)
Dept: NEPHROLOGY | Facility: CLINIC | Age: 89
End: 2024-04-30
Payer: MEDICARE

## 2024-04-30 VITALS
SYSTOLIC BLOOD PRESSURE: 128 MMHG | HEIGHT: 63 IN | BODY MASS INDEX: 21.09 KG/M2 | WEIGHT: 119 LBS | HEART RATE: 90 BPM | DIASTOLIC BLOOD PRESSURE: 80 MMHG | RESPIRATION RATE: 16 BRPM | OXYGEN SATURATION: 96 %

## 2024-04-30 DIAGNOSIS — F51.05 ANXIETY DISORDER, UNSPECIFIED: ICD-10-CM

## 2024-04-30 DIAGNOSIS — I73.9 PERIPHERAL VASCULAR DISEASE, UNSPECIFIED: ICD-10-CM

## 2024-04-30 DIAGNOSIS — I10 ESSENTIAL (PRIMARY) HYPERTENSION: ICD-10-CM

## 2024-04-30 DIAGNOSIS — F41.9 ANXIETY DISORDER, UNSPECIFIED: ICD-10-CM

## 2024-04-30 DIAGNOSIS — I48.91 UNSPECIFIED ATRIAL FIBRILLATION: ICD-10-CM

## 2024-04-30 DIAGNOSIS — K59.09 OTHER CONSTIPATION: ICD-10-CM

## 2024-04-30 DIAGNOSIS — N18.4 CHRONIC KIDNEY DISEASE, STAGE 4 (SEVERE): ICD-10-CM

## 2024-04-30 PROCEDURE — 99213 OFFICE O/P EST LOW 20 MIN: CPT

## 2024-04-30 NOTE — PHYSICAL EXAM
[General Appearance - Alert] : alert [Sclera] : the sclera and conjunctiva were normal [Hearing Threshold Finger Rub Not [place]] : hearing was normal [Jugular Venous Distention Increased] : there was no jugular-venous distention [Auscultation Breath Sounds / Voice Sounds] : lungs were clear to auscultation bilaterally [Irregularly Irregular] : the rhythm was irregularly irregular [Systolic grade ___/6] : A grade [unfilled]/6 systolic murmur was heard. [Pitting Edema] : pitting edema present [___ +] : bilateral [unfilled]+ pitting edema to the ankles [Abdomen Tenderness] : non-tender [Urinary Bladder Findings] : the bladder was normal on palpation [No CVA Tenderness] : no ~M costovertebral angle tenderness [Shuffling] : shuffling [] : no rash [No Focal Deficits] : no focal deficits [FreeTextEntry1] : Depressed

## 2024-04-30 NOTE — ASSESSMENT
[FreeTextEntry1] : We addressed first her two presenting complaints. 1. Insomnia: the patient states that she goes to bed at 9:30 and cannot fall asleep as her mind keeps her awake w/ anxiety issues.  She says she is not sleeping at all during the night.  I discussed w/ her to take the Clonazepam at night not in the morning. She will do so. 2. Chronic constipation. The patient has relief w/ Miralax and/or Senokot but she is afraid of taking these medications regularly. Is discussed with her to use these agents if she does not have a bowel movement on day.  Other issue. 3. PAD: acrocyanosis unchanged. No limb threatening. 4. Atrial fibrillation on Eliquis. 5. Hypertension well controlled. 6. CKD IV stable.  Return in 3 months.  approximated

## 2024-04-30 NOTE — HISTORY OF PRESENT ILLNESS
[FreeTextEntry1] : Last seen in February of 2024. Clinciall unchanged: depressed, does not know why she is still alive, has insomnia, frequent headaches.  She will be 89 soon and was invited to spend two week at her daughter house in [place].  Has had a mammogram (normal) and a bone density (low). Saw vascular a month ago: acrocyanosis unchanged. Labs were done in early March and were stable. Major issues today are constipation and insomnia.

## 2024-05-01 ENCOUNTER — RX RENEWAL (OUTPATIENT)
Age: 89
End: 2024-05-01

## 2024-06-10 ENCOUNTER — APPOINTMENT (OUTPATIENT)
Dept: CARDIOLOGY | Facility: CLINIC | Age: 89
End: 2024-06-10
Payer: MEDICARE

## 2024-06-10 VITALS
HEART RATE: 89 BPM | HEIGHT: 63 IN | DIASTOLIC BLOOD PRESSURE: 86 MMHG | OXYGEN SATURATION: 96 % | RESPIRATION RATE: 16 BRPM | TEMPERATURE: 97 F | BODY MASS INDEX: 20.91 KG/M2 | SYSTOLIC BLOOD PRESSURE: 119 MMHG | WEIGHT: 118 LBS

## 2024-06-10 DIAGNOSIS — R14.0 ABDOMINAL DISTENSION (GASEOUS): ICD-10-CM

## 2024-06-10 DIAGNOSIS — R60.0 LOCALIZED EDEMA: ICD-10-CM

## 2024-06-10 PROCEDURE — 93978 VASCULAR STUDY: CPT

## 2024-06-10 PROCEDURE — 99214 OFFICE O/P EST MOD 30 MIN: CPT

## 2024-06-10 PROCEDURE — G2211 COMPLEX E/M VISIT ADD ON: CPT

## 2024-06-10 NOTE — REASON FOR VISIT
[Follow-Up - Clinic] : a clinic follow-up of [FreeTextEntry2] : Acrocyanosis / History of PAD [FreeTextEntry1] : 6/10/2024 Celebrated 89- year old birthday here with friend on exam, while laying down she has PALPABLE pedal pulses There is resting blue discoloration of the left foot wearing wool socks Abdominal aortic ultrasound shows no AAA, some athero noted.     3/18  Since last visit patient reports doing ok  she has some itching and discomfort at times she is wearing compression She has mild, intermitted symptoms at night.   1/26/24  88 y/o F with PMHX HTN, HLD, CKD, Hypothyroidism, history of PAD, HFrEF, Afib, who presents for evaluation of blue purple discoloration that resolves with elevation, noticed by Dr. Mccrary during the last visit. Tiny skin tear L 3rd digit after cutting her nails last night. On Eliquis 2.5 mg BID.

## 2024-06-10 NOTE — ASSESSMENT
[FreeTextEntry1] : Assessment: 1. Acrocyanosis on exam  2. History of PAD   Plan 1.    No additional vascular testing 2.  She has a resting blue/purple discoloration that resolves with elevation and warming 3.  No wounds 4 Keep core body termpature warm, wool socks  5.  Return in 1 year.

## 2024-06-10 NOTE — PHYSICAL EXAM
[] : no respiratory distress [Respiration, Rhythm And Depth] : normal respiratory rhythm and effort [Auscultation Breath Sounds / Voice Sounds] : lungs were clear to auscultation bilaterally [Heart Rate And Rhythm] : heart rate and rhythm were normal [Heart Sounds] : normal S1 and S2 [Bowel Sounds] : normal bowel sounds [Abdomen Soft] : soft [Abdomen Tenderness] : non-tender [Abnormal Walk] : normal gait [Oriented To Time, Place, And Person] : oriented to person, place, and time [FreeTextEntry1] : see above

## 2024-06-10 NOTE — REVIEW OF SYSTEMS
[Negative] : Heme/Lymph [FreeTextEntry5] : see HPI [FreeTextEntry9] : see HPI [de-identified] : see HPI

## 2024-08-06 ENCOUNTER — RX RENEWAL (OUTPATIENT)
Age: 89
End: 2024-08-06

## 2024-08-15 ENCOUNTER — APPOINTMENT (OUTPATIENT)
Dept: INTERNAL MEDICINE | Facility: CLINIC | Age: 89
End: 2024-08-15
Payer: MEDICARE

## 2024-08-15 ENCOUNTER — NON-APPOINTMENT (OUTPATIENT)
Age: 89
End: 2024-08-15

## 2024-08-15 ENCOUNTER — APPOINTMENT (OUTPATIENT)
Dept: CARDIOLOGY | Facility: CLINIC | Age: 89
End: 2024-08-15
Payer: MEDICARE

## 2024-08-15 VITALS
BODY MASS INDEX: 20.91 KG/M2 | HEART RATE: 74 BPM | OXYGEN SATURATION: 98 % | HEIGHT: 63 IN | DIASTOLIC BLOOD PRESSURE: 72 MMHG | SYSTOLIC BLOOD PRESSURE: 111 MMHG | WEIGHT: 118 LBS | RESPIRATION RATE: 12 BRPM

## 2024-08-15 DIAGNOSIS — E78.5 HYPERLIPIDEMIA, UNSPECIFIED: ICD-10-CM

## 2024-08-15 DIAGNOSIS — N18.4 CHRONIC KIDNEY DISEASE, STAGE 4 (SEVERE): ICD-10-CM

## 2024-08-15 DIAGNOSIS — F41.8 OTHER SPECIFIED ANXIETY DISORDERS: ICD-10-CM

## 2024-08-15 DIAGNOSIS — I10 ESSENTIAL (PRIMARY) HYPERTENSION: ICD-10-CM

## 2024-08-15 DIAGNOSIS — I48.91 UNSPECIFIED ATRIAL FIBRILLATION: ICD-10-CM

## 2024-08-15 DIAGNOSIS — I73.9 PERIPHERAL VASCULAR DISEASE, UNSPECIFIED: ICD-10-CM

## 2024-08-15 DIAGNOSIS — E03.9 HYPOTHYROIDISM, UNSPECIFIED: ICD-10-CM

## 2024-08-15 PROCEDURE — 99214 OFFICE O/P EST MOD 30 MIN: CPT

## 2024-08-15 PROCEDURE — G2211 COMPLEX E/M VISIT ADD ON: CPT

## 2024-08-15 PROCEDURE — 93000 ELECTROCARDIOGRAM COMPLETE: CPT

## 2024-08-15 NOTE — PHYSICAL EXAM

## 2024-08-15 NOTE — PHYSICAL EXAM

## 2024-08-15 NOTE — REASON FOR VISIT
[Cardiac Failure] : cardiac failure [Arrhythmia/ECG Abnorrmalities] : arrhythmia/ECG abnormalities [Family Member] : family member

## 2024-08-15 NOTE — DISCUSSION/SUMMARY
[FreeTextEntry1] : The patient is a 89-year-old anxious female HTN, HLD, HH, persistent A-fib with RV dysfunction, CKD who is symptomatic hypotension. #1 A-fib- c/w eliquis 2.5mg bid, c/w  toprol 25mg,  #2 HFrEF- RV failure, off losartan 25mg, c/w bumex 1mg, metolazone 5mg daily and hold spironolactone 25mg, f/u Dr. Mccrary, Labs today #3 HLD- c/w simvastatin 10mg #4 Thyroid- c/w levothyroxine #5 Anxiety- on clonazepam, stress management and discuss with daughter getting help at home.  #6 PAD- discoloration secondary to varicose veins, wool socks at night, f/u Dr. Tinoco Daughter lives in [facility].  [EKG obtained to assist in diagnosis and management of assessed problem(s)] : EKG obtained to assist in diagnosis and management of assessed problem(s)

## 2024-08-15 NOTE — HISTORY OF PRESENT ILLNESS
[FreeTextEntry1] : Gia does not feel well and gets lightheaded. Neck pain. Low BP readings all under 90 reviewed.

## 2024-08-15 NOTE — DISCUSSION/SUMMARY
[FreeTextEntry1] : The patient is a 89-year-old anxious female HTN, HLD, HH, persistent A-fib with RV dysfunction, CKD who is symptomatic hypotension. #1 A-fib- c/w eliquis 2.5mg bid, c/w  toprol 25mg,  #2 HFrEF- RV failure, off losartan 25mg, c/w bumex 1mg, metolazone 5mg daily and hold spironolactone 25mg, f/u Dr. Mccrary, Labs today #3 HLD- c/w simvastatin 10mg #4 Thyroid- c/w levothyroxine #5 Anxiety- on clonazepam, stress management and discuss with daughter getting help at home.  #6 PAD- discoloration secondary to varicose veins, wool socks at night, f/u Dr. Tinoco Daughter lives in [facility].  [EKG obtained to assist in diagnosis and management of assessed problem(s)] : EKG obtained to assist in diagnosis and management of assessed problem(s)

## 2024-08-16 LAB
ALBUMIN SERPL ELPH-MCNC: 4.1 G/DL
ALP BLD-CCNC: 68 U/L
ALT SERPL-CCNC: 19 U/L
ANION GAP SERPL CALC-SCNC: 19 MMOL/L
ANION GAP SERPL CALC-SCNC: 20 MMOL/L
AST SERPL-CCNC: 27 U/L
BASOPHILS # BLD AUTO: 0.05 K/UL
BASOPHILS NFR BLD AUTO: 0.8 %
BILIRUB SERPL-MCNC: 0.6 MG/DL
BUN SERPL-MCNC: 78 MG/DL
BUN SERPL-MCNC: 81 MG/DL
CALCIUM SERPL-MCNC: 9.5 MG/DL
CALCIUM SERPL-MCNC: 9.5 MG/DL
CHLORIDE SERPL-SCNC: 89 MMOL/L
CHLORIDE SERPL-SCNC: 90 MMOL/L
CHOLEST SERPL-MCNC: 169 MG/DL
CO2 SERPL-SCNC: 27 MMOL/L
CO2 SERPL-SCNC: 28 MMOL/L
CREAT SERPL-MCNC: 2.33 MG/DL
CREAT SERPL-MCNC: 2.34 MG/DL
EGFR: 19 ML/MIN/1.73M2
EGFR: 19 ML/MIN/1.73M2
EOSINOPHIL # BLD AUTO: 0.1 K/UL
EOSINOPHIL NFR BLD AUTO: 1.6 %
ESTIMATED AVERAGE GLUCOSE: 128 MG/DL
GLUCOSE SERPL-MCNC: 77 MG/DL
GLUCOSE SERPL-MCNC: 79 MG/DL
HBA1C MFR BLD HPLC: 6.1 %
HCT VFR BLD CALC: 46.1 %
HDLC SERPL-MCNC: 60 MG/DL
HGB BLD-MCNC: 14.6 G/DL
IMM GRANULOCYTES NFR BLD AUTO: 0.5 %
LDLC SERPL CALC-MCNC: 75 MG/DL
LYMPHOCYTES # BLD AUTO: 1.49 K/UL
LYMPHOCYTES NFR BLD AUTO: 23.6 %
MAN DIFF?: NORMAL
MCHC RBC-ENTMCNC: 31.7 GM/DL
MCHC RBC-ENTMCNC: 32.3 PG
MCV RBC AUTO: 102 FL
MONOCYTES # BLD AUTO: 0.69 K/UL
MONOCYTES NFR BLD AUTO: 10.9 %
NEUTROPHILS # BLD AUTO: 3.95 K/UL
NEUTROPHILS NFR BLD AUTO: 62.6 %
NONHDLC SERPL-MCNC: 109 MG/DL
NT-PROBNP SERPL-MCNC: 3774 PG/ML
PLATELET # BLD AUTO: 202 K/UL
POTASSIUM SERPL-SCNC: 3.4 MMOL/L
POTASSIUM SERPL-SCNC: 3.7 MMOL/L
PROT SERPL-MCNC: 7.2 G/DL
RBC # BLD: 4.52 M/UL
RBC # FLD: 13.9 %
SODIUM SERPL-SCNC: 136 MMOL/L
SODIUM SERPL-SCNC: 137 MMOL/L
TRIGL SERPL-MCNC: 210 MG/DL
TSH SERPL-ACNC: 0.43 UIU/ML
WBC # FLD AUTO: 6.31 K/UL

## 2024-08-18 NOTE — PLAN
[FreeTextEntry1] : Follow up  General weakness we'll check CBC with diff today  Constipation cont Colace, increase to 2 tabs qhs  Afib, HTN (BP is low today), CHF cont Eliquis 2.5mg BID cont metoprolol 25mg daily following with cardiology Dr. Munoz  b/vivienne LE edema, PAD cont bumetanide 1mg 2 tabs daily cont metolazone 5mg daily following with vascular Dr. Tinoco  HLD cont simvastatin 10mg qhs low cholesterol/low fat diet We'll check Lipid panel and LFTs today.  Hypothyroidism cont levothyroxine 75mcg daily We'll check TSH/T4 today.  CKD IV -stable following with nephrology Dr. Mccrary -follow up lj't later this month  Anxiety, insomnia cont Clonazepam 1mg 1.5 tab qhs  Blood work ordered. Follow up in one week for lab results  then follow up in 3 months

## 2024-08-18 NOTE — REVIEW OF SYSTEMS
[Itching] : Itching [Negative] : Heme/Lymph [FreeTextEntry7] : difficulties with BM [Abdominal Pain] : no abdominal pain [Nausea] : no nausea [Constipation] : constipation [Vomiting] : no vomiting [Heartburn] : no heartburn [Headache] : no headache [FreeTextEntry2] : general unwellness [de-identified] : lightheadedness

## 2024-08-18 NOTE — HISTORY OF PRESENT ILLNESS
[de-identified] : Ms. LEONIDAS PEREA is a 89 year old female with Hx of CHF, anxiety/insomnia, Afib, GERD, HTN, CKD (stage IV), HLD, hypothyroidism, hypokalemia, and PAD, who presents for a follow up visit. Accompanied by family member.   Pt is not feeling well. Was seen by cardiology Dr. Munoz earlier today, pt has low BP readings under 90 in office with pt c/o lightheadedness, she was therefore instructed by Dr. Munoz to stop spironolactone 15mg. Pt does monitor her BP at home. C/o difficulty moving bowels and abdominal bloating. Takes laxatives prn and had edema yesterday with minimal improvement. Colace does not relieve Sx. C/o itchy skin. Denies any SOB, CP, abdominal pain, N/V/D, or headache. Reports compliance with taking her meds daily.

## 2024-08-18 NOTE — END OF VISIT
[FreeTextEntry3] : Documented by Opal Foley acting as a scribe for Dr. Katz. 08/15/2024   All medical record entries made by the scribe were at my, Dr. Katz, direction and personally dictated by me on 08/15/2024. I have reviewed the chart and agree that the record accurately reflects my personal performance of the history, physical exam, assessment and plan. I have also personally directed, reviewed, and agreed with the chart.

## 2024-08-18 NOTE — PHYSICAL EXAM
[No Acute Distress] : no acute distress [Normal Sclera/Conjunctiva] : normal sclera/conjunctiva [Normal Outer Ear/Nose] : the outer ears and nose were normal in appearance [No JVD] : no jugular venous distention [No Lymphadenopathy] : no lymphadenopathy [Supple] : supple [Thyroid Normal, No Nodules] : the thyroid was normal and there were no nodules present [No Respiratory Distress] : no respiratory distress  [No Accessory Muscle Use] : no accessory muscle use [Clear to Auscultation] : lungs were clear to auscultation bilaterally [Normal Rate] : normal rate  [Regular Rhythm] : with a regular rhythm [Normal S1, S2] : normal S1 and S2 [No Murmur] : no murmur heard [No Carotid Bruits] : no carotid bruits [Pedal Pulses Present] : the pedal pulses are present [No Extremity Clubbing/Cyanosis] : no extremity clubbing/cyanosis [Soft] : abdomen soft [Non Tender] : non-tender [Non-distended] : non-distended [No Masses] : no abdominal mass palpated [No HSM] : no HSM [Normal Bowel Sounds] : normal bowel sounds [Normal Posterior Cervical Nodes] : no posterior cervical lymphadenopathy [Normal Anterior Cervical Nodes] : no anterior cervical lymphadenopathy [No CVA Tenderness] : no CVA  tenderness [No Spinal Tenderness] : no spinal tenderness [No Joint Swelling] : no joint swelling [Grossly Normal Strength/Tone] : grossly normal strength/tone [No Rash] : no rash [Coordination Grossly Intact] : coordination grossly intact [No Focal Deficits] : no focal deficits [Normal Gait] : normal gait [Deep Tendon Reflexes (DTR)] : deep tendon reflexes were 2+ and symmetric [Normal Affect] : the affect was normal [Normal Insight/Judgement] : insight and judgment were intact [de-identified] : + mild b/l LE edema, + varicose veins [de-identified] : + scab by right ear

## 2024-08-18 NOTE — REVIEW OF SYSTEMS
[Itching] : Itching [Negative] : Heme/Lymph [FreeTextEntry7] : difficulties with BM [Abdominal Pain] : no abdominal pain [Nausea] : no nausea [Constipation] : constipation [Vomiting] : no vomiting [Heartburn] : no heartburn [Headache] : no headache [FreeTextEntry2] : general unwellness [de-identified] : lightheadedness

## 2024-08-18 NOTE — PHYSICAL EXAM
[No Acute Distress] : no acute distress [Normal Sclera/Conjunctiva] : normal sclera/conjunctiva [Normal Outer Ear/Nose] : the outer ears and nose were normal in appearance [No JVD] : no jugular venous distention [No Lymphadenopathy] : no lymphadenopathy [Supple] : supple [Thyroid Normal, No Nodules] : the thyroid was normal and there were no nodules present [No Respiratory Distress] : no respiratory distress  [No Accessory Muscle Use] : no accessory muscle use [Clear to Auscultation] : lungs were clear to auscultation bilaterally [Normal Rate] : normal rate  [Regular Rhythm] : with a regular rhythm [Normal S1, S2] : normal S1 and S2 [No Murmur] : no murmur heard [No Carotid Bruits] : no carotid bruits [Pedal Pulses Present] : the pedal pulses are present [No Extremity Clubbing/Cyanosis] : no extremity clubbing/cyanosis [Soft] : abdomen soft [Non Tender] : non-tender [Non-distended] : non-distended [No Masses] : no abdominal mass palpated [No HSM] : no HSM [Normal Bowel Sounds] : normal bowel sounds [Normal Posterior Cervical Nodes] : no posterior cervical lymphadenopathy [Normal Anterior Cervical Nodes] : no anterior cervical lymphadenopathy [No CVA Tenderness] : no CVA  tenderness [No Spinal Tenderness] : no spinal tenderness [No Joint Swelling] : no joint swelling [Grossly Normal Strength/Tone] : grossly normal strength/tone [No Rash] : no rash [Coordination Grossly Intact] : coordination grossly intact [No Focal Deficits] : no focal deficits [Normal Gait] : normal gait [Deep Tendon Reflexes (DTR)] : deep tendon reflexes were 2+ and symmetric [Normal Affect] : the affect was normal [Normal Insight/Judgement] : insight and judgment were intact [de-identified] : + mild b/l LE edema, + varicose veins [de-identified] : + scab by right ear

## 2024-08-18 NOTE — HISTORY OF PRESENT ILLNESS
[de-identified] : Ms. LEONIDAS PEREA is a 89 year old female with Hx of CHF, anxiety/insomnia, Afib, GERD, HTN, CKD (stage IV), HLD, hypothyroidism, hypokalemia, and PAD, who presents for a follow up visit. Accompanied by family member.   Pt is not feeling well. Was seen by cardiology Dr. Munoz earlier today, pt has low BP readings under 90 in office with pt c/o lightheadedness, she was therefore instructed by Dr. Munoz to stop spironolactone 15mg. Pt does monitor her BP at home. C/o difficulty moving bowels and abdominal bloating. Takes laxatives prn and had edema yesterday with minimal improvement. Colace does not relieve Sx. C/o itchy skin. Denies any SOB, CP, abdominal pain, N/V/D, or headache. Reports compliance with taking her meds daily.

## 2024-08-18 NOTE — REVIEW OF SYSTEMS
[Itching] : Itching [Negative] : Heme/Lymph [FreeTextEntry7] : difficulties with BM [Abdominal Pain] : no abdominal pain [Nausea] : no nausea [Constipation] : constipation [Vomiting] : no vomiting [Heartburn] : no heartburn [Headache] : no headache [FreeTextEntry2] : general unwellness [de-identified] : lightheadedness

## 2024-08-18 NOTE — PHYSICAL EXAM
[No Acute Distress] : no acute distress [Normal Sclera/Conjunctiva] : normal sclera/conjunctiva [Normal Outer Ear/Nose] : the outer ears and nose were normal in appearance [No JVD] : no jugular venous distention [No Lymphadenopathy] : no lymphadenopathy [Supple] : supple [Thyroid Normal, No Nodules] : the thyroid was normal and there were no nodules present [No Respiratory Distress] : no respiratory distress  [No Accessory Muscle Use] : no accessory muscle use [Clear to Auscultation] : lungs were clear to auscultation bilaterally [Normal Rate] : normal rate  [Regular Rhythm] : with a regular rhythm [Normal S1, S2] : normal S1 and S2 [No Murmur] : no murmur heard [No Carotid Bruits] : no carotid bruits [Pedal Pulses Present] : the pedal pulses are present [No Extremity Clubbing/Cyanosis] : no extremity clubbing/cyanosis [Soft] : abdomen soft [Non Tender] : non-tender [Non-distended] : non-distended [No Masses] : no abdominal mass palpated [No HSM] : no HSM [Normal Bowel Sounds] : normal bowel sounds [Normal Posterior Cervical Nodes] : no posterior cervical lymphadenopathy [Normal Anterior Cervical Nodes] : no anterior cervical lymphadenopathy [No CVA Tenderness] : no CVA  tenderness [No Spinal Tenderness] : no spinal tenderness [No Joint Swelling] : no joint swelling [Grossly Normal Strength/Tone] : grossly normal strength/tone [No Rash] : no rash [Coordination Grossly Intact] : coordination grossly intact [No Focal Deficits] : no focal deficits [Normal Gait] : normal gait [Deep Tendon Reflexes (DTR)] : deep tendon reflexes were 2+ and symmetric [Normal Affect] : the affect was normal [Normal Insight/Judgement] : insight and judgment were intact [de-identified] : + mild b/l LE edema, + varicose veins [de-identified] : + scab by right ear

## 2024-08-18 NOTE — HISTORY OF PRESENT ILLNESS
[de-identified] : Ms. LEONIDAS PEREA is a 89 year old female with Hx of CHF, anxiety/insomnia, Afib, GERD, HTN, CKD (stage IV), HLD, hypothyroidism, hypokalemia, and PAD, who presents for a follow up visit. Accompanied by family member.   Pt is not feeling well. Was seen by cardiology Dr. Munoz earlier today, pt has low BP readings under 90 in office with pt c/o lightheadedness, she was therefore instructed by Dr. Munoz to stop spironolactone 15mg. Pt does monitor her BP at home. C/o difficulty moving bowels and abdominal bloating. Takes laxatives prn and had edema yesterday with minimal improvement. Colace does not relieve Sx. C/o itchy skin. Denies any SOB, CP, abdominal pain, N/V/D, or headache. Reports compliance with taking her meds daily.

## 2024-08-23 ENCOUNTER — APPOINTMENT (OUTPATIENT)
Dept: NEPHROLOGY | Facility: CLINIC | Age: 89
End: 2024-08-23

## 2024-08-27 ENCOUNTER — APPOINTMENT (OUTPATIENT)
Dept: NEPHROLOGY | Facility: CLINIC | Age: 89
End: 2024-08-27
Payer: MEDICARE

## 2024-08-27 VITALS
OXYGEN SATURATION: 98 % | DIASTOLIC BLOOD PRESSURE: 72 MMHG | HEART RATE: 76 BPM | SYSTOLIC BLOOD PRESSURE: 108 MMHG | RESPIRATION RATE: 16 BRPM | WEIGHT: 117 LBS | HEIGHT: 63 IN | BODY MASS INDEX: 20.73 KG/M2

## 2024-08-27 DIAGNOSIS — N18.4 CHRONIC KIDNEY DISEASE, STAGE 4 (SEVERE): ICD-10-CM

## 2024-08-27 DIAGNOSIS — R60.0 LOCALIZED EDEMA: ICD-10-CM

## 2024-08-27 DIAGNOSIS — I48.91 UNSPECIFIED ATRIAL FIBRILLATION: ICD-10-CM

## 2024-08-27 DIAGNOSIS — R42 DIZZINESS AND GIDDINESS: ICD-10-CM

## 2024-08-27 DIAGNOSIS — K59.09 OTHER CONSTIPATION: ICD-10-CM

## 2024-08-27 DIAGNOSIS — I10 ESSENTIAL (PRIMARY) HYPERTENSION: ICD-10-CM

## 2024-08-27 PROCEDURE — 99213 OFFICE O/P EST LOW 20 MIN: CPT

## 2024-08-27 NOTE — HISTORY OF PRESENT ILLNESS
[FreeTextEntry1] : The patient presents today w/ multiple complaints.  Fatigued, dizzy when she stands up quickly, constipated, postprandial fullness.  She was seen by her cardiologist, her BP was on the low side and Spironolactone was discontinued. Metolazone 5 mg and Bumetanide 1 mg per day were continued. Labs done on August 15 showed a creatinine of 2.33, eGFR of 19 and serum K of 3.4.  Here for evaluation of her symptoms.

## 2024-08-27 NOTE — ASSESSMENT
[FreeTextEntry1] : 1. Orthostatic dizziness, fatigue. Likely the result of orthostatic hypotension and hypokalemia. Spironolactone was reinstituted and Metolazone was decreased to three days per week.  Bumetanide was discontinued. 2. CKD IV Stable. 3. Atrial fibrillation: continue Eliquis. 4. Constipation: olive oil and Senokot.  5 Hypertension: see 1.  6. Leg edema: has resolved.  Follow up telephone visit in one month.

## 2024-08-27 NOTE — PHYSICAL EXAM
[General Appearance - Alert] : alert [Sclera] : the sclera and conjunctiva were normal [Hearing Threshold Finger Rub Not [place]] : hearing was normal [Jugular Venous Distention Increased] : there was no jugular-venous distention [Auscultation Breath Sounds / Voice Sounds] : lungs were clear to auscultation bilaterally [Irregularly Irregular] : the rhythm was irregularly irregular [Systolic grade ___/6] : A grade [unfilled]/6 systolic murmur was heard. [Pitting Edema] : pitting edema present [___ +] : bilateral [unfilled]+ pitting edema to the ankles [Abdomen Tenderness] : non-tender [Urinary Bladder Findings] : the bladder was normal on palpation [No CVA Tenderness] : no ~M costovertebral angle tenderness [Shuffling] : shuffling [] : no rash [No Focal Deficits] : no focal deficits [FreeTextEntry1] : Depressed

## 2024-08-30 ENCOUNTER — LABORATORY RESULT (OUTPATIENT)
Age: 89
End: 2024-08-30

## 2024-09-17 ENCOUNTER — RX RENEWAL (OUTPATIENT)
Age: 89
End: 2024-09-17

## 2024-10-18 ENCOUNTER — RX RENEWAL (OUTPATIENT)
Age: 89
End: 2024-10-18

## 2024-10-24 ENCOUNTER — NON-APPOINTMENT (OUTPATIENT)
Age: 89
End: 2024-10-24

## 2024-10-29 DIAGNOSIS — N18.4 CHRONIC KIDNEY DISEASE, STAGE 4 (SEVERE): ICD-10-CM

## 2024-10-29 DIAGNOSIS — K59.00 CONSTIPATION, UNSPECIFIED: ICD-10-CM

## 2024-10-31 ENCOUNTER — RX RENEWAL (OUTPATIENT)
Age: 89
End: 2024-10-31

## 2024-10-31 ENCOUNTER — LABORATORY RESULT (OUTPATIENT)
Age: 89
End: 2024-10-31

## 2024-11-01 ENCOUNTER — LABORATORY RESULT (OUTPATIENT)
Age: 89
End: 2024-11-01

## 2024-11-02 ENCOUNTER — NON-APPOINTMENT (OUTPATIENT)
Age: 89
End: 2024-11-02

## 2024-11-14 ENCOUNTER — APPOINTMENT (OUTPATIENT)
Dept: INTERNAL MEDICINE | Facility: CLINIC | Age: 89
End: 2024-11-14

## 2024-11-14 ENCOUNTER — LABORATORY RESULT (OUTPATIENT)
Age: 89
End: 2024-11-14

## 2024-11-14 ENCOUNTER — NON-APPOINTMENT (OUTPATIENT)
Age: 89
End: 2024-11-14

## 2024-11-14 ENCOUNTER — APPOINTMENT (OUTPATIENT)
Dept: CARDIOLOGY | Facility: CLINIC | Age: 89
End: 2024-11-14
Payer: MEDICARE

## 2024-11-14 VITALS
OXYGEN SATURATION: 97 % | DIASTOLIC BLOOD PRESSURE: 71 MMHG | HEIGHT: 63 IN | SYSTOLIC BLOOD PRESSURE: 110 MMHG | RESPIRATION RATE: 19 BRPM | BODY MASS INDEX: 20.55 KG/M2 | WEIGHT: 116 LBS | HEART RATE: 79 BPM

## 2024-11-14 DIAGNOSIS — Z00.00 ENCOUNTER FOR GENERAL ADULT MEDICAL EXAMINATION W/OUT ABNORMAL FINDINGS: ICD-10-CM

## 2024-11-14 DIAGNOSIS — I48.91 UNSPECIFIED ATRIAL FIBRILLATION: ICD-10-CM

## 2024-11-14 DIAGNOSIS — N18.4 CHRONIC KIDNEY DISEASE, STAGE 4 (SEVERE): ICD-10-CM

## 2024-11-14 DIAGNOSIS — I10 ESSENTIAL (PRIMARY) HYPERTENSION: ICD-10-CM

## 2024-11-14 DIAGNOSIS — I73.9 PERIPHERAL VASCULAR DISEASE, UNSPECIFIED: ICD-10-CM

## 2024-11-14 DIAGNOSIS — R51.9 HEADACHE, UNSPECIFIED: ICD-10-CM

## 2024-11-14 PROCEDURE — 99214 OFFICE O/P EST MOD 30 MIN: CPT

## 2024-11-14 PROCEDURE — G0439: CPT

## 2024-11-14 PROCEDURE — G2211 COMPLEX E/M VISIT ADD ON: CPT

## 2024-11-14 PROCEDURE — 93000 ELECTROCARDIOGRAM COMPLETE: CPT

## 2024-11-14 PROCEDURE — 99213 OFFICE O/P EST LOW 20 MIN: CPT | Mod: 25

## 2024-11-14 PROCEDURE — G0444 DEPRESSION SCREEN ANNUAL: CPT | Mod: 59

## 2024-11-15 LAB — NT-PROBNP SERPL-MCNC: 4395 PG/ML

## 2024-11-19 LAB
25(OH)D3 SERPL-MCNC: 50.8 NG/ML
ESTIMATED AVERAGE GLUCOSE: 126 MG/DL
HBA1C MFR BLD HPLC: 6 %
TSH SERPL-ACNC: 0.85 UIU/ML
VIT B12 SERPL-MCNC: 573 PG/ML

## 2024-11-21 LAB
APPEARANCE: CLEAR
BILIRUBIN URINE: NEGATIVE
BLOOD URINE: NEGATIVE
COLOR: YELLOW
GLUCOSE QUALITATIVE U: NEGATIVE MG/DL
KETONES URINE: NEGATIVE MG/DL
LEUKOCYTE ESTERASE URINE: ABNORMAL
NITRITE URINE: NEGATIVE
PH URINE: 6
PROTEIN URINE: NEGATIVE MG/DL
SPECIFIC GRAVITY URINE: 1.02
UROBILINOGEN URINE: 0.2 MG/DL

## 2024-12-02 ENCOUNTER — RX RENEWAL (OUTPATIENT)
Age: 88
End: 2024-12-02

## 2024-12-20 ENCOUNTER — APPOINTMENT (OUTPATIENT)
Dept: INTERNAL MEDICINE | Facility: CLINIC | Age: 88
End: 2024-12-20
Payer: MEDICARE

## 2024-12-20 ENCOUNTER — RESULT CHARGE (OUTPATIENT)
Age: 88
End: 2024-12-20

## 2024-12-20 ENCOUNTER — APPOINTMENT (OUTPATIENT)
Dept: OTOLARYNGOLOGY | Facility: CLINIC | Age: 88
End: 2024-12-20
Payer: MEDICARE

## 2024-12-20 VITALS
OXYGEN SATURATION: 99 % | DIASTOLIC BLOOD PRESSURE: 87 MMHG | SYSTOLIC BLOOD PRESSURE: 130 MMHG | HEIGHT: 63 IN | WEIGHT: 116 LBS | HEART RATE: 98 BPM | TEMPERATURE: 98.2 F | BODY MASS INDEX: 20.55 KG/M2

## 2024-12-20 DIAGNOSIS — E78.5 HYPERLIPIDEMIA, UNSPECIFIED: ICD-10-CM

## 2024-12-20 DIAGNOSIS — Z87.898 PERSONAL HISTORY OF OTHER SPECIFIED CONDITIONS: ICD-10-CM

## 2024-12-20 DIAGNOSIS — R04.0 EPISTAXIS: ICD-10-CM

## 2024-12-20 DIAGNOSIS — I48.91 UNSPECIFIED ATRIAL FIBRILLATION: ICD-10-CM

## 2024-12-20 PROCEDURE — 31238 NSL/SINS NDSC SRG NSL HEMRRG: CPT | Mod: RT

## 2024-12-20 PROCEDURE — 99204 OFFICE O/P NEW MOD 45 MIN: CPT | Mod: 25

## 2024-12-20 PROCEDURE — 99214 OFFICE O/P EST MOD 30 MIN: CPT

## 2024-12-27 LAB
INR PPP: 1.1 RATIO
POCT-PROTHROMBIN TIME: 13.6 SECS

## 2025-01-07 ENCOUNTER — APPOINTMENT (OUTPATIENT)
Dept: OTOLARYNGOLOGY | Facility: CLINIC | Age: 89
End: 2025-01-07
Payer: MEDICARE

## 2025-01-07 VITALS — BODY MASS INDEX: 20.55 KG/M2 | HEIGHT: 63 IN | WEIGHT: 116 LBS

## 2025-01-07 DIAGNOSIS — J34.89 OTHER SPECIFIED DISORDERS OF NOSE AND NASAL SINUSES: ICD-10-CM

## 2025-01-07 DIAGNOSIS — R04.0 EPISTAXIS: ICD-10-CM

## 2025-01-07 PROCEDURE — 99213 OFFICE O/P EST LOW 20 MIN: CPT

## 2025-02-07 ENCOUNTER — RX RENEWAL (OUTPATIENT)
Age: 89
End: 2025-02-07

## 2025-03-19 ENCOUNTER — APPOINTMENT (OUTPATIENT)
Dept: CARDIOLOGY | Facility: CLINIC | Age: 89
End: 2025-03-19
Payer: MEDICARE

## 2025-03-19 ENCOUNTER — NON-APPOINTMENT (OUTPATIENT)
Age: 89
End: 2025-03-19

## 2025-03-19 ENCOUNTER — APPOINTMENT (OUTPATIENT)
Dept: INTERNAL MEDICINE | Facility: CLINIC | Age: 89
End: 2025-03-19
Payer: MEDICARE

## 2025-03-19 VITALS
HEART RATE: 101 BPM | SYSTOLIC BLOOD PRESSURE: 141 MMHG | DIASTOLIC BLOOD PRESSURE: 93 MMHG | HEIGHT: 63 IN | BODY MASS INDEX: 21.79 KG/M2 | RESPIRATION RATE: 12 BRPM | OXYGEN SATURATION: 98 % | WEIGHT: 123 LBS

## 2025-03-19 DIAGNOSIS — Z12.39 ENCOUNTER FOR OTHER SCREENING FOR MALIGNANT NEOPLASM OF BREAST: ICD-10-CM

## 2025-03-19 DIAGNOSIS — R23.8 OTHER SKIN CHANGES: ICD-10-CM

## 2025-03-19 DIAGNOSIS — F41.9 ANXIETY DISORDER, UNSPECIFIED: ICD-10-CM

## 2025-03-19 DIAGNOSIS — I73.9 PERIPHERAL VASCULAR DISEASE, UNSPECIFIED: ICD-10-CM

## 2025-03-19 DIAGNOSIS — E03.9 HYPOTHYROIDISM, UNSPECIFIED: ICD-10-CM

## 2025-03-19 LAB
ANION GAP SERPL CALC-SCNC: 16 MMOL/L
BUN SERPL-MCNC: 60 MG/DL
CALCIUM SERPL-MCNC: 9.8 MG/DL
CHLORIDE SERPL-SCNC: 103 MMOL/L
CO2 SERPL-SCNC: 26 MMOL/L
CREAT SERPL-MCNC: 1.97 MG/DL
EGFRCR SERPLBLD CKD-EPI 2021: 24 ML/MIN/1.73M2
GLUCOSE SERPL-MCNC: 106 MG/DL
NT-PROBNP SERPL-MCNC: 3738 PG/ML
POTASSIUM SERPL-SCNC: 4.2 MMOL/L
SODIUM SERPL-SCNC: 146 MMOL/L

## 2025-03-19 PROCEDURE — G2211 COMPLEX E/M VISIT ADD ON: CPT

## 2025-03-19 PROCEDURE — 99214 OFFICE O/P EST MOD 30 MIN: CPT

## 2025-03-19 PROCEDURE — 93000 ELECTROCARDIOGRAM COMPLETE: CPT

## 2025-03-19 RX ORDER — DOCUSATE SODIUM 100 MG/1
100 CAPSULE ORAL
Qty: 30 | Refills: 1 | Status: ACTIVE | COMMUNITY
Start: 2025-03-19 | End: 1900-01-01

## 2025-03-19 RX ORDER — KETOCONAZOLE 20 MG/ML
2 SHAMPOO TOPICAL
Qty: 1 | Refills: 1 | Status: ACTIVE | COMMUNITY
Start: 2025-03-19 | End: 1900-01-01

## 2025-03-20 LAB
ESTIMATED AVERAGE GLUCOSE: 137 MG/DL
HBA1C MFR BLD HPLC: 6.4 %

## 2025-03-25 ENCOUNTER — APPOINTMENT (OUTPATIENT)
Dept: NEPHROLOGY | Facility: CLINIC | Age: 89
End: 2025-03-25
Payer: MEDICARE

## 2025-03-25 VITALS
HEART RATE: 88 BPM | HEIGHT: 63 IN | OXYGEN SATURATION: 97 % | BODY MASS INDEX: 21.62 KG/M2 | RESPIRATION RATE: 16 BRPM | WEIGHT: 122 LBS

## 2025-03-25 DIAGNOSIS — N18.4 CHRONIC KIDNEY DISEASE, STAGE 4 (SEVERE): ICD-10-CM

## 2025-03-25 DIAGNOSIS — I10 ESSENTIAL (PRIMARY) HYPERTENSION: ICD-10-CM

## 2025-03-25 DIAGNOSIS — I48.91 UNSPECIFIED ATRIAL FIBRILLATION: ICD-10-CM

## 2025-03-25 DIAGNOSIS — K59.00 CONSTIPATION, UNSPECIFIED: ICD-10-CM

## 2025-03-25 PROCEDURE — 99213 OFFICE O/P EST LOW 20 MIN: CPT

## 2025-05-01 ENCOUNTER — RX RENEWAL (OUTPATIENT)
Age: 89
End: 2025-05-01

## 2025-05-02 ENCOUNTER — RX RENEWAL (OUTPATIENT)
Age: 89
End: 2025-05-02

## 2025-06-16 ENCOUNTER — APPOINTMENT (OUTPATIENT)
Dept: CARDIOLOGY | Facility: CLINIC | Age: 89
End: 2025-06-16
Payer: MEDICARE

## 2025-06-16 VITALS
SYSTOLIC BLOOD PRESSURE: 113 MMHG | HEIGHT: 63 IN | BODY MASS INDEX: 21.62 KG/M2 | WEIGHT: 122 LBS | DIASTOLIC BLOOD PRESSURE: 75 MMHG | RESPIRATION RATE: 18 BRPM | OXYGEN SATURATION: 100 % | HEART RATE: 98 BPM

## 2025-06-16 PROCEDURE — G2211 COMPLEX E/M VISIT ADD ON: CPT

## 2025-06-16 PROCEDURE — 99213 OFFICE O/P EST LOW 20 MIN: CPT

## 2025-06-25 ENCOUNTER — APPOINTMENT (OUTPATIENT)
Dept: INTERNAL MEDICINE | Facility: CLINIC | Age: 89
End: 2025-06-25
Payer: MEDICARE

## 2025-06-25 ENCOUNTER — NON-APPOINTMENT (OUTPATIENT)
Age: 89
End: 2025-06-25

## 2025-06-25 ENCOUNTER — APPOINTMENT (OUTPATIENT)
Dept: CARDIOLOGY | Facility: CLINIC | Age: 89
End: 2025-06-25
Payer: MEDICARE

## 2025-06-25 VITALS
BODY MASS INDEX: 21.62 KG/M2 | OXYGEN SATURATION: 98 % | WEIGHT: 122 LBS | HEART RATE: 110 BPM | RESPIRATION RATE: 19 BRPM | DIASTOLIC BLOOD PRESSURE: 78 MMHG | SYSTOLIC BLOOD PRESSURE: 117 MMHG | TEMPERATURE: 97.2 F | HEIGHT: 63 IN

## 2025-06-25 PROBLEM — L98.9 SKIN LESION OF FACE: Status: ACTIVE | Noted: 2025-06-25

## 2025-06-25 PROBLEM — M25.531 RIGHT WRIST PAIN: Status: ACTIVE | Noted: 2025-06-25

## 2025-06-25 LAB
ALBUMIN SERPL ELPH-MCNC: 4.4 G/DL
ALP BLD-CCNC: 65 U/L
ALT SERPL-CCNC: 16 U/L
ANION GAP SERPL CALC-SCNC: 20 MMOL/L
AST SERPL-CCNC: 27 U/L
BASOPHILS # BLD AUTO: 0.04 K/UL
BASOPHILS NFR BLD AUTO: 0.6 %
BILIRUB SERPL-MCNC: 0.8 MG/DL
BUN SERPL-MCNC: 80 MG/DL
CALCIUM SERPL-MCNC: 10 MG/DL
CHLORIDE SERPL-SCNC: 97 MMOL/L
CHOLEST SERPL-MCNC: 216 MG/DL
CO2 SERPL-SCNC: 25 MMOL/L
CREAT SERPL-MCNC: 2.12 MG/DL
EGFRCR SERPLBLD CKD-EPI 2021: 22 ML/MIN/1.73M2
EOSINOPHIL # BLD AUTO: 0.08 K/UL
EOSINOPHIL NFR BLD AUTO: 1.2 %
ESTIMATED AVERAGE GLUCOSE: 128 MG/DL
GLUCOSE SERPL-MCNC: 113 MG/DL
HBA1C MFR BLD HPLC: 6.1 %
HCT VFR BLD CALC: 46.9 %
HDLC SERPL-MCNC: 64 MG/DL
HGB BLD-MCNC: 15.1 G/DL
IMM GRANULOCYTES NFR BLD AUTO: 0.4 %
LDLC SERPL-MCNC: 129 MG/DL
LYMPHOCYTES # BLD AUTO: 1.2 K/UL
LYMPHOCYTES NFR BLD AUTO: 17.3 %
MAN DIFF?: NORMAL
MCHC RBC-ENTMCNC: 31.9 PG
MCHC RBC-ENTMCNC: 32.2 G/DL
MCV RBC AUTO: 99.2 FL
MONOCYTES # BLD AUTO: 0.53 K/UL
MONOCYTES NFR BLD AUTO: 7.6 %
NEUTROPHILS # BLD AUTO: 5.05 K/UL
NEUTROPHILS NFR BLD AUTO: 72.9 %
NONHDLC SERPL-MCNC: 152 MG/DL
PLATELET # BLD AUTO: 196 K/UL
POTASSIUM SERPL-SCNC: 3.5 MMOL/L
PROT SERPL-MCNC: 7.3 G/DL
RBC # BLD: 4.73 M/UL
RBC # FLD: 13.7 %
SODIUM SERPL-SCNC: 142 MMOL/L
TRIGL SERPL-MCNC: 127 MG/DL
TSH SERPL-ACNC: 0.28 UIU/ML
WBC # FLD AUTO: 6.93 K/UL

## 2025-06-25 PROCEDURE — G2211 COMPLEX E/M VISIT ADD ON: CPT

## 2025-06-25 PROCEDURE — 99214 OFFICE O/P EST MOD 30 MIN: CPT

## 2025-06-25 PROCEDURE — 93000 ELECTROCARDIOGRAM COMPLETE: CPT

## 2025-06-25 RX ORDER — DICLOFENAC SODIUM 10 MG/G
1 GEL TOPICAL
Qty: 1 | Refills: 1 | Status: ACTIVE | COMMUNITY
Start: 2025-06-25 | End: 1900-01-01

## 2025-06-26 LAB — NT-PROBNP SERPL-MCNC: 3118 PG/ML

## 2025-07-15 ENCOUNTER — RX RENEWAL (OUTPATIENT)
Age: 89
End: 2025-07-15

## 2025-07-21 DIAGNOSIS — M25.541 PAIN IN JOINTS OF RIGHT HAND: ICD-10-CM

## 2025-07-21 DIAGNOSIS — M25.542 PAIN IN JOINTS OF RIGHT HAND: ICD-10-CM

## 2025-07-23 ENCOUNTER — LABORATORY RESULT (OUTPATIENT)
Age: 89
End: 2025-07-23

## 2025-07-25 ENCOUNTER — APPOINTMENT (OUTPATIENT)
Dept: OTOLARYNGOLOGY | Facility: CLINIC | Age: 89
End: 2025-07-25
Payer: MEDICARE

## 2025-07-25 ENCOUNTER — NON-APPOINTMENT (OUTPATIENT)
Age: 89
End: 2025-07-25

## 2025-07-25 VITALS
OXYGEN SATURATION: 97 % | SYSTOLIC BLOOD PRESSURE: 102 MMHG | HEIGHT: 63 IN | DIASTOLIC BLOOD PRESSURE: 70 MMHG | HEART RATE: 90 BPM | BODY MASS INDEX: 21.62 KG/M2 | WEIGHT: 122 LBS

## 2025-07-25 DIAGNOSIS — H61.23 IMPACTED CERUMEN, BILATERAL: ICD-10-CM

## 2025-07-25 DIAGNOSIS — H93.13 TINNITUS, BILATERAL: ICD-10-CM

## 2025-07-25 DIAGNOSIS — H93.293 OTHER ABNORMAL AUDITORY PERCEPTIONS, BILATERAL: ICD-10-CM

## 2025-07-25 PROCEDURE — 92557 COMPREHENSIVE HEARING TEST: CPT

## 2025-07-25 PROCEDURE — 92567 TYMPANOMETRY: CPT

## 2025-07-25 PROCEDURE — G0268 REMOVAL OF IMPACTED WAX MD: CPT

## 2025-07-25 PROCEDURE — 99213 OFFICE O/P EST LOW 20 MIN: CPT | Mod: 25

## 2025-08-01 DIAGNOSIS — M10.9 GOUT, UNSPECIFIED: ICD-10-CM

## 2025-08-01 DIAGNOSIS — E79.0 HYPERURICEMIA W/OUT SIGNS OF INFLAMMATORY ARTHRITIS AND TOPHACEOUS DISEASE: ICD-10-CM

## 2025-08-01 RX ORDER — ALLOPURINOL 100 MG/1
100 TABLET ORAL
Qty: 30 | Refills: 0 | Status: ACTIVE | COMMUNITY
Start: 2025-08-01 | End: 1900-01-01

## 2025-08-01 RX ORDER — PREDNISONE 10 MG/1
10 TABLET ORAL
Qty: 1 | Refills: 1 | Status: ACTIVE | COMMUNITY
Start: 2025-08-01 | End: 1900-01-01

## 2025-08-07 ENCOUNTER — NON-APPOINTMENT (OUTPATIENT)
Age: 89
End: 2025-08-07

## 2025-09-12 ENCOUNTER — APPOINTMENT (OUTPATIENT)
Dept: NEPHROLOGY | Facility: CLINIC | Age: 89
End: 2025-09-12
Payer: MEDICARE

## 2025-09-12 VITALS
OXYGEN SATURATION: 98 % | DIASTOLIC BLOOD PRESSURE: 68 MMHG | SYSTOLIC BLOOD PRESSURE: 128 MMHG | HEART RATE: 85 BPM | HEIGHT: 63 IN | WEIGHT: 122 LBS | BODY MASS INDEX: 21.62 KG/M2 | RESPIRATION RATE: 16 BRPM

## 2025-09-12 DIAGNOSIS — F41.9 ANXIETY DISORDER, UNSPECIFIED: ICD-10-CM

## 2025-09-12 DIAGNOSIS — K59.00 CONSTIPATION, UNSPECIFIED: ICD-10-CM

## 2025-09-12 PROCEDURE — 99213 OFFICE O/P EST LOW 20 MIN: CPT

## 2025-09-16 LAB
ALBUMIN SERPL ELPH-MCNC: 4.3 G/DL
ALP BLD-CCNC: 65 U/L
ALT SERPL-CCNC: 18 U/L
ANION GAP SERPL CALC-SCNC: 18 MMOL/L
AST SERPL-CCNC: 28 U/L
BILIRUB SERPL-MCNC: 0.7 MG/DL
BUN SERPL-MCNC: 64 MG/DL
CALCIUM SERPL-MCNC: 9.9 MG/DL
CHLORIDE SERPL-SCNC: 98 MMOL/L
CO2 SERPL-SCNC: 27 MMOL/L
CREAT SERPL-MCNC: 1.91 MG/DL
EGFRCR SERPLBLD CKD-EPI 2021: 24 ML/MIN/1.73M2
GLUCOSE SERPL-MCNC: 121 MG/DL
HCT VFR BLD CALC: 47.4 %
HGB BLD-MCNC: 14.4 G/DL
MCHC RBC-ENTMCNC: 30.4 G/DL
MCHC RBC-ENTMCNC: 30.8 PG
MCV RBC AUTO: 101.3 FL
PLATELET # BLD AUTO: 203 K/UL
POTASSIUM SERPL-SCNC: 3.7 MMOL/L
PROT SERPL-MCNC: 6.9 G/DL
RBC # BLD: 4.68 M/UL
RBC # FLD: 14.9 %
SODIUM SERPL-SCNC: 142 MMOL/L
URATE SERPL-MCNC: 10.9 MG/DL
WBC # FLD AUTO: 8.45 K/UL

## 2025-09-16 RX ORDER — ALLOPURINOL 100 MG/1
100 TABLET ORAL
Qty: 30 | Refills: 3 | Status: ACTIVE | COMMUNITY
Start: 2025-09-16 | End: 1900-01-01

## 2025-09-18 DIAGNOSIS — N18.4 CHRONIC KIDNEY DISEASE, STAGE 4 (SEVERE): ICD-10-CM

## 2025-09-18 DIAGNOSIS — I10 ESSENTIAL (PRIMARY) HYPERTENSION: ICD-10-CM

## 2025-09-18 DIAGNOSIS — M10.9 GOUT, UNSPECIFIED: ICD-10-CM

## 2025-09-18 DIAGNOSIS — I48.91 UNSPECIFIED ATRIAL FIBRILLATION: ICD-10-CM
